# Patient Record
Sex: MALE | Race: WHITE | Employment: FULL TIME | URBAN - METROPOLITAN AREA
[De-identification: names, ages, dates, MRNs, and addresses within clinical notes are randomized per-mention and may not be internally consistent; named-entity substitution may affect disease eponyms.]

---

## 2017-02-06 ENCOUNTER — OP HISTORICAL/CONVERTED ENCOUNTER (OUTPATIENT)
Dept: OTHER | Age: 42
End: 2017-02-06

## 2018-06-06 ENCOUNTER — ED HISTORICAL/CONVERTED ENCOUNTER (OUTPATIENT)
Dept: OTHER | Age: 43
End: 2018-06-06

## 2023-02-07 ENCOUNTER — HOSPITAL ENCOUNTER (EMERGENCY)
Age: 48
Discharge: HOME | End: 2023-02-07
Attending: EMERGENCY MEDICINE
Payer: COMMERCIAL

## 2023-02-07 ENCOUNTER — APPOINTMENT (OUTPATIENT)
Dept: CT IMAGING | Age: 48
End: 2023-02-07
Attending: EMERGENCY MEDICINE
Payer: COMMERCIAL

## 2023-02-07 VITALS
TEMPERATURE: 98.3 F | HEIGHT: 72 IN | WEIGHT: 265 LBS | OXYGEN SATURATION: 98 % | HEART RATE: 88 BPM | RESPIRATION RATE: 16 BRPM | BODY MASS INDEX: 35.89 KG/M2 | SYSTOLIC BLOOD PRESSURE: 132 MMHG | DIASTOLIC BLOOD PRESSURE: 73 MMHG

## 2023-02-07 DIAGNOSIS — S29.019A THORACIC MYOFASCIAL STRAIN, INITIAL ENCOUNTER: Primary | ICD-10-CM

## 2023-02-07 LAB
APPEARANCE UR: CLEAR
BACTERIA URNS QL MICRO: NEGATIVE /HPF
BILIRUB UR QL: NEGATIVE
COLOR UR: YELLOW
EPITH CASTS URNS QL MICRO: NORMAL /LPF
GLUCOSE UR STRIP.AUTO-MCNC: NEGATIVE MG/DL
HGB UR QL STRIP: NEGATIVE
KETONES UR QL STRIP.AUTO: NEGATIVE MG/DL
LEUKOCYTE ESTERASE UR QL STRIP.AUTO: NEGATIVE
MUCOUS THREADS URNS QL MICRO: NORMAL /LPF
NITRITE UR QL STRIP.AUTO: NEGATIVE
PH UR STRIP: 7 (ref 5–8)
PROT UR STRIP-MCNC: NEGATIVE MG/DL
RBC #/AREA URNS HPF: NORMAL /HPF (ref 0–5)
SP GR UR REFRACTOMETRY: 1.01 (ref 1–1.03)
SPERM URNS QL MICRO: <1
UROBILINOGEN UR QL STRIP.AUTO: NEGATIVE EU/DL (ref 0.1–1)
WBC URNS QL MICRO: NORMAL /HPF (ref 0–4)

## 2023-02-07 PROCEDURE — 74011250636 HC RX REV CODE- 250/636: Performed by: EMERGENCY MEDICINE

## 2023-02-07 PROCEDURE — 96374 THER/PROPH/DIAG INJ IV PUSH: CPT

## 2023-02-07 PROCEDURE — 74176 CT ABD & PELVIS W/O CONTRAST: CPT

## 2023-02-07 PROCEDURE — 81003 URINALYSIS AUTO W/O SCOPE: CPT

## 2023-02-07 PROCEDURE — 99284 EMERGENCY DEPT VISIT MOD MDM: CPT

## 2023-02-07 RX ORDER — CYCLOBENZAPRINE HCL 10 MG
10 TABLET ORAL
Qty: 20 TABLET | Refills: 0 | Status: SHIPPED | OUTPATIENT
Start: 2023-02-07

## 2023-02-07 RX ORDER — KETOROLAC TROMETHAMINE 30 MG/ML
30 INJECTION, SOLUTION INTRAMUSCULAR; INTRAVENOUS ONCE
Status: COMPLETED | OUTPATIENT
Start: 2023-02-07 | End: 2023-02-07

## 2023-02-07 RX ORDER — NAPROXEN 500 MG/1
500 TABLET ORAL 2 TIMES DAILY WITH MEALS
Qty: 20 TABLET | Refills: 0 | Status: SHIPPED | OUTPATIENT
Start: 2023-02-07

## 2023-02-07 RX ADMIN — KETOROLAC TROMETHAMINE 30 MG: 30 INJECTION, SOLUTION INTRAMUSCULAR; INTRAVENOUS at 12:02

## 2023-02-07 RX ADMIN — SODIUM CHLORIDE 1000 ML: 9 INJECTION, SOLUTION INTRAVENOUS at 12:02

## 2023-02-07 NOTE — Clinical Note
[address] [facility] EMERGENCY DEPT  [address] [place] [address] [place] [address]  [phone]    Work/School Note    Date: 2/7/2023    To Whom It May concern:      Mary Melara was seen and treated today in the emergency room by the following provider(s):  Attending Provider: Trinity Calvert MD.      Mary Melara is excused from work/school on 02/07/23. He is clear to return to work/school on 02/08/23.         Sincerely,          Herrera Trotter MD

## 2023-02-07 NOTE — DISCHARGE INSTRUCTIONS
Thank you! Thank you for allowing me to care for you in the emergency department. It is my goal to provide you with excellent care. If you have not received excellent quality care, please ask to speak to the nurse manager. Please fill out the survey that will come to you by mail or email since we listen to your feedback! Below you will find a list of your tests from today's visit. Should you have any questions, please do not hesitate to call the emergency department. Labs  Recent Results (from the past 12 hour(s))   URINALYSIS W/ RFLX MICROSCOPIC    Collection Time: 02/07/23 11:52 AM   Result Value Ref Range    Color Yellow      Appearance Clear Clear      Specific gravity 1.015 1.003 - 1.030      pH (UA) 7.0 5.0 - 8.0      Protein Negative Negative mg/dL    Glucose Negative Negative mg/dL    Ketone Negative Negative mg/dL    Bilirubin Negative Negative      Blood Negative Negative      Urobilinogen Negative 0.1 - 1.0 EU/dL    Nitrites Negative Negative      Leukocyte Esterase Negative Negative      WBC 0-4 0 - 4 /hpf    RBC 0-5 0 - 5 /hpf    Epithelial cells Few Few /lpf    Mucus Trace /lpf    PRESUMPTIVE SPERMATO <1     Bacteria Negative Negative /hpf       Radiologic Studies  CT ABD PELV WO CONT   Final Result         Fatty liver, no acute abnormality        CT Results  (Last 48 hours)                 02/07/23 1141  CT ABD PELV WO CONT Final result    Impression:          Fatty liver, no acute abnormality       Narrative:  EXAM: CT ABD PELV WO CONT       INDICATION: r flank pain       COMPARISON: None       IV CONTRAST: None. ORAL CONTRAST: None       TECHNIQUE:    Thin axial images were obtained through the abdomen and pelvis. Coronal and   sagittal reformats were generated. CT dose reduction was achieved through use of   a standardized protocol tailored for this examination and automatic exposure   control for dose modulation.         The absence of intravenous contrast material reduces the sensitivity for   evaluation of the vasculature and solid organs. FINDINGS:    LOWER THORAX: No significant abnormality in the incidentally imaged lower chest.   LIVER: Diffuse fatty liver   BILIARY TREE: Gallbladder is within normal limits. CBD is not dilated. SPLEEN: within normal limits. PANCREAS: No focal abnormality. ADRENALS: Unremarkable. KIDNEYS/URETERS: Subcentimeter hypodensity left kidney too small to   characterize. No hydronephrosis or stone   STOMACH: Unremarkable. SMALL BOWEL: No dilatation or wall thickening. COLON: No dilatation or wall thickening. APPENDIX: Normal   PERITONEUM: No ascites or pneumoperitoneum. RETROPERITONEUM: No lymphadenopathy or aortic aneurysm. There are vascular   calcifications   REPRODUCTIVE ORGANS: Not enlarged   URINARY BLADDER: No mass or calculus. BONES: No destructive bone lesion. ABDOMINAL WALL: Umbilical hernia containing fat   ADDITIONAL COMMENTS: N/A                 CXR Results  (Last 48 hours)      None          ------------------------------------------------------------------------------------------------------------  The exam and treatment you received in the Emergency Department were for an urgent problem and are not intended as complete care. It is important that you follow-up with a doctor, nurse practitioner, or physician assistant to:  (1) confirm your diagnosis,  (2) re-evaluation of changes in your illness and treatment, and  (3) for ongoing care. Please take your discharge instructions with you when you go to your follow-up appointment. If you have any problem arranging a follow-up appointment, contact the Emergency Department. If your symptoms become worse or you do not improve as expected and you are unable to reach your health care provider, please return to the Emergency Department. We are available 24 hours a day.      If a prescription has been provided, please have it filled as soon as possible to prevent a delay in treatment. If you have any questions or reservations about taking the medication due to side effects or interactions with other medications, please call your primary care provider or contact the ER.

## 2023-02-07 NOTE — ED TRIAGE NOTES
Pt complains of left flank pain that started around 1800 yesterday. Denies urinary symptoms.  Denies N/V.

## 2023-02-07 NOTE — Clinical Note
[facility] EMERGENCY DEPT  [address] [place] [id number]  [phone]    Work/School Note    Date: 2/7/2023    To Whom It May concern:    Aldo Nicole was seen and treated today in the emergency room by the following provider(s):  Attending Provider: Janeal Hashimoto, MD.      Aldo Nicole is excused from work/school on 02/07/23 and 02/08/23. He is medically clear to return to work/school on 2/9/2023.        Sincerely,          Eli Trevino RN spouse

## 2023-02-07 NOTE — ED PROVIDER NOTES
EMERGENCY DEPARTMENT HISTORY AND PHYSICAL EXAM      Date: 2/7/2023  Patient Name: Marion Johnson    History of Presenting Illness     Chief Complaint   Patient presents with    Flank Pain       History Provided By: Patient    HPI: Marion Johnson, 49 y.o. male with a past medical history significant No significant past medical history presents to the ED with chief complaint of Flank Pain  . 37 with flank pain on the right side. No radiation. No trauma or twisting movements that he can recall to trigger it. Referred by his primary care doctor. No meds prior to arrival.  Pain is isolated to the flank. There are no other complaints, changes, or physical findings at this time. PCP: Anthony Quick MD    Current Outpatient Medications   Medication Sig Dispense Refill    naproxen (NAPROSYN) 500 mg tablet Take 1 Tablet by mouth two (2) times daily (with meals). 20 Tablet 0    cyclobenzaprine (FLEXERIL) 10 mg tablet Take 1 Tablet by mouth three (3) times daily as needed for Muscle Spasm(s). 20 Tablet 0       Past History     Past Medical History:  Past Medical History:   Diagnosis Date    Hypertension        Past Surgical History:  History reviewed. No pertinent surgical history. Family History:  History reviewed. No pertinent family history. Social History:  Social History     Tobacco Use    Smoking status: Never    Smokeless tobacco: Never   Substance Use Topics    Alcohol use: Never    Drug use: Never       Allergies: Allergies   Allergen Reactions    Azithromycin Hives         Review of Systems   Review of Systems   Constitutional: Negative. Negative for chills, fatigue and fever. HENT: Negative. Negative for congestion, ear pain, nosebleeds and sore throat. Eyes: Negative. Negative for pain, discharge and visual disturbance. Respiratory: Negative. Negative for cough, chest tightness and shortness of breath. Cardiovascular: Negative.   Negative for chest pain and leg swelling. Gastrointestinal: Negative. Negative for abdominal pain, blood in stool, constipation, diarrhea, nausea and vomiting. Endocrine: Negative. Genitourinary:  Positive for flank pain. Negative for difficulty urinating and dysuria. Musculoskeletal:  Negative for back pain and myalgias. Skin: Negative. Negative for rash and wound. Allergic/Immunologic: Negative. Neurological: Negative. Negative for dizziness, syncope, weakness, numbness and headaches. Hematological: Negative. Does not bruise/bleed easily. Psychiatric/Behavioral: Negative. Negative for agitation, confusion, hallucinations and suicidal ideas. All other systems reviewed and are negative. Positives and Pertinent negatives as per HPI. Physical Exam   Patient Vitals for the past 24 hrs:   Temp Pulse Resp BP SpO2   02/07/23 1323 -- 88 16 132/73 98 %   02/07/23 1115 98.3 °F (36.8 °C) 79 17 (!) 147/99 99 %         Physical Exam  Vitals and nursing note reviewed. Constitutional:       General: He is not in acute distress. Appearance: He is normal weight. He is not ill-appearing. HENT:      Head: Normocephalic and atraumatic. Right Ear: External ear normal.      Left Ear: External ear normal.      Nose: Nose normal. No rhinorrhea. Mouth/Throat:      Mouth: Mucous membranes are moist.      Pharynx: Oropharynx is clear. Eyes:      Extraocular Movements: Extraocular movements intact. Conjunctiva/sclera: Conjunctivae normal.      Pupils: Pupils are equal, round, and reactive to light. Cardiovascular:      Rate and Rhythm: Normal rate and regular rhythm. Pulses: Normal pulses. Heart sounds: Normal heart sounds. Pulmonary:      Effort: Pulmonary effort is normal. No respiratory distress. Breath sounds: Normal breath sounds. Abdominal:      General: Abdomen is flat. Bowel sounds are normal.      Palpations: Abdomen is soft. Tenderness: There is right CVA tenderness. Musculoskeletal:         General: No tenderness or deformity. Normal range of motion. Cervical back: Normal range of motion and neck supple. Skin:     General: Skin is warm and dry. Capillary Refill: Capillary refill takes less than 2 seconds. Findings: No bruising, lesion or rash. Neurological:      General: No focal deficit present. Mental Status: He is alert and oriented to person, place, and time. Mental status is at baseline. Psychiatric:         Mood and Affect: Mood normal.         Behavior: Behavior normal.         Thought Content: Thought content normal.         Judgment: Judgment normal.       Diagnostic Study Results     LABS:   Recent Results (from the past 12 hour(s))   URINALYSIS W/ RFLX MICROSCOPIC    Collection Time: 02/07/23 11:52 AM   Result Value Ref Range    Color Yellow      Appearance Clear Clear      Specific gravity 1.015 1.003 - 1.030      pH (UA) 7.0 5.0 - 8.0      Protein Negative Negative mg/dL    Glucose Negative Negative mg/dL    Ketone Negative Negative mg/dL    Bilirubin Negative Negative      Blood Negative Negative      Urobilinogen Negative 0.1 - 1.0 EU/dL    Nitrites Negative Negative      Leukocyte Esterase Negative Negative      WBC 0-4 0 - 4 /hpf    RBC 0-5 0 - 5 /hpf    Epithelial cells Few Few /lpf    Mucus Trace /lpf    PRESUMPTIVE SPERMATO <1     Bacteria Negative Negative /hpf        EKG: EKG interpreted independently by ER physician. RADIOLOGY:  Non-plain film images such as CT, Ultrasound and MRI are read by the radiologist. Plain radiographic images are visualized and preliminarily interpreted by the ED Provider with the below findings:          Interpretation per the Radiologist below, if available at the time of this note:     CT ABD PELV WO CONT    Result Date: 2/7/2023  EXAM: CT ABD PELV WO CONT INDICATION: r flank pain COMPARISON: None IV CONTRAST: None.  ORAL CONTRAST: None TECHNIQUE: Thin axial images were obtained through the abdomen and pelvis. Coronal and sagittal reformats were generated. CT dose reduction was achieved through use of a standardized protocol tailored for this examination and automatic exposure control for dose modulation. The absence of intravenous contrast material reduces the sensitivity for evaluation of the vasculature and solid organs. FINDINGS: LOWER THORAX: No significant abnormality in the incidentally imaged lower chest. LIVER: Diffuse fatty liver BILIARY TREE: Gallbladder is within normal limits. CBD is not dilated. SPLEEN: within normal limits. PANCREAS: No focal abnormality. ADRENALS: Unremarkable. KIDNEYS/URETERS: Subcentimeter hypodensity left kidney too small to characterize. No hydronephrosis or stone STOMACH: Unremarkable. SMALL BOWEL: No dilatation or wall thickening. COLON: No dilatation or wall thickening. APPENDIX: Normal PERITONEUM: No ascites or pneumoperitoneum. RETROPERITONEUM: No lymphadenopathy or aortic aneurysm. There are vascular calcifications REPRODUCTIVE ORGANS: Not enlarged URINARY BLADDER: No mass or calculus. BONES: No destructive bone lesion. ABDOMINAL WALL: Umbilical hernia containing fat ADDITIONAL COMMENTS: N/A     Fatty liver, no acute abnormality    CT Results  (Last 48 hours)                 02/07/23 1141  CT ABD PELV WO CONT Final result    Impression:          Fatty liver, no acute abnormality       Narrative:  EXAM: CT ABD PELV WO CONT       INDICATION: r flank pain       COMPARISON: None       IV CONTRAST: None. ORAL CONTRAST: None       TECHNIQUE:    Thin axial images were obtained through the abdomen and pelvis. Coronal and   sagittal reformats were generated. CT dose reduction was achieved through use of   a standardized protocol tailored for this examination and automatic exposure   control for dose modulation. The absence of intravenous contrast material reduces the sensitivity for   evaluation of the vasculature and solid organs.        FINDINGS:    LOWER THORAX: No significant abnormality in the incidentally imaged lower chest.   LIVER: Diffuse fatty liver   BILIARY TREE: Gallbladder is within normal limits. CBD is not dilated. SPLEEN: within normal limits. PANCREAS: No focal abnormality. ADRENALS: Unremarkable. KIDNEYS/URETERS: Subcentimeter hypodensity left kidney too small to   characterize. No hydronephrosis or stone   STOMACH: Unremarkable. SMALL BOWEL: No dilatation or wall thickening. COLON: No dilatation or wall thickening. APPENDIX: Normal   PERITONEUM: No ascites or pneumoperitoneum. RETROPERITONEUM: No lymphadenopathy or aortic aneurysm. There are vascular   calcifications   REPRODUCTIVE ORGANS: Not enlarged   URINARY BLADDER: No mass or calculus. BONES: No destructive bone lesion. ABDOMINAL WALL: Umbilical hernia containing fat   ADDITIONAL COMMENTS: N/A                 CXR Results  (Last 48 hours)      None            Medical Decision Making and ED Course       CC/HPI Summary, DDx, ED Course, and Reassessment: 50-year-old presents with flank pain. Differential includes musculoskeletal versus kidney stone versus pyelonephritis. Also dissection. Plan for urine CAT scan and reassessment after pain control. These orders were placed during the ER evaluation:  Orders Placed This Encounter    CT ABD PELV WO CONT     Standing Status:   Standing     Number of Occurrences:   1     Order Specific Question:   Type of contrast.  PLEASE NOTE: IV contrast is NOT utilized with this order.      Answer:   None     Order Specific Question:   Transport     Answer:   Stretcher [5]     Order Specific Question:   Reason for Exam     Answer:   r flank pain     Order Specific Question:   Decision Support Exception     Answer:   Emergency Medical Condition (MA) [1]    URINALYSIS W/ RFLX MICROSCOPIC     Standing Status:   Standing     Number of Occurrences:   1    ketorolac (TORADOL) injection 30 mg    sodium chloride 0.9 % bolus infusion 1,000 mL naproxen (NAPROSYN) 500 mg tablet     Sig: Take 1 Tablet by mouth two (2) times daily (with meals). Dispense:  20 Tablet     Refill:  0    cyclobenzaprine (FLEXERIL) 10 mg tablet     Sig: Take 1 Tablet by mouth three (3) times daily as needed for Muscle Spasm(s). Dispense:  20 Tablet     Refill:  0        Patient was given the following medications:  Medications   ketorolac (TORADOL) injection 30 mg (30 mg IntraVENous Given 2/7/23 1202)   sodium chloride 0.9 % bolus infusion 1,000 mL (0 mL IntraVENous IV Completed 2/7/23 1323)           ED Course:       ED Course as of 02/07/23 1414   Tue Feb 07, 2023   1310 WBC: 0-4 [HP]   1310 Epithelial cells: Few  Ua neg [HP]      ED Course User Index  [HP] Hector Snyder MD         Vital Signs-Reviewed the patient's vital signs. Patient Vitals for the past 24 hrs:   Temp Pulse Resp BP SpO2   02/07/23 1323 -- 88 16 132/73 98 %   02/07/23 1115 98.3 °F (36.8 °C) 79 17 (!) 147/99 99 %     Vitals interpreted independently by ER physician. stable    Medical decision making tools:                No data recorded          Disposition Considerations (Tests not done, Shared Decision Making, Pt Expectation of Test or Tx.): 57-year-old male with flank pain CAT scan is negative for kidney stones. Urine also unremarkable. Pain improved with Toradol. Patient also is not tachypneic or hypoxic to suggest a PE. No evidence of a AAA. Patient closely follow-up with PCP will discharge home with muscle relaxant Flexeril and NSAIDs for pain. Encourage heat application. CONSULTS: (Who and What was discussed)  None    Chronic Conditions: no    Social Determinants affecting Dx or Tx: None    Records Reviewed (source and summary of external notes): None  Records Reviewed: Previous Hospital chart. EMS run report. I reviewed the vital signs, available nursing notes, past medical history, past surgical history, family history and social history. Initial assessment performed.  The patients presenting problems have been discussed, and they are in agreement with the care plan formulated and outlined with them. I have encouraged them to ask questions as they arise throughout their visit. Discharged      Procedures               Disposition       Emergency Department Disposition:  Discharged      Diagnosis     Clinical Impression:   1. Thoracic myofascial strain, initial encounter        Attestations:    Cathy Khan MD    Please note that this dictation was completed with [employer], the computer voice recognition software. Quite often unanticipated grammatical, syntax, homophones, and other interpretive errors are inadvertently transcribed by the computer software. Please disregard these errors. Please excuse any errors that have escaped final proofreading. Thank you.

## 2023-02-07 NOTE — Clinical Note
[facility] EMERGENCY DEPT  [address] [place] [id number]  [phone]    Work/School Note    Date: 2/7/2023    To Whom It May concern:      Aldo Nicole was seen and treated today in the emergency room by the following provider(s):  Attending Provider: Janeal Hashimoto, MD.      Aldo Nicole is excused from work/school on 02/07/23. He is clear to return to work/school on 02/08/23.         Sincerely,          Eli Trevino RN

## 2023-02-07 NOTE — Clinical Note
[facility] EMERGENCY DEPT  [address] [place] [id number]  [phone]    Work/School Note    Date: 2/7/2023    To Whom It May concern:    Wes Giang was seen and treated today in the emergency room by the following provider(s):  Attending Provider: Jorge Simpson MD.      Wes Giang is excused from work/school on 02/07/23 and 02/08/23. He is medically clear to return to work/school on 2/9/2023.        Sincerely,          Jose Goodwin MD

## 2023-10-04 ENCOUNTER — HOSPITAL ENCOUNTER (EMERGENCY)
Facility: HOSPITAL | Age: 48
Discharge: HOME | End: 2023-10-04
Payer: OTHER MISCELLANEOUS

## 2023-10-04 VITALS
HEIGHT: 70 IN | OXYGEN SATURATION: 96 % | HEART RATE: 81 BPM | BODY MASS INDEX: 40.09 KG/M2 | SYSTOLIC BLOOD PRESSURE: 151 MMHG | WEIGHT: 280 LBS | TEMPERATURE: 98.2 F | DIASTOLIC BLOOD PRESSURE: 91 MMHG | RESPIRATION RATE: 16 BRPM

## 2023-10-04 DIAGNOSIS — Z98.890 HISTORY OF REPAIR OF ROTATOR CUFF: ICD-10-CM
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
## 2023-10-04 PROCEDURE — 96372 THER/PROPH/DIAG INJ SC/IM: CPT

## 2023-10-04 PROCEDURE — 6360000002 HC RX W HCPCS

## 2023-10-04 PROCEDURE — 99284 EMERGENCY DEPT VISIT MOD MDM: CPT

## 2023-10-04 RX ORDER — KETOROLAC TROMETHAMINE 30 MG/ML
30 INJECTION, SOLUTION INTRAMUSCULAR; INTRAVENOUS
Status: COMPLETED | OUTPATIENT
Start: 2023-10-04 | End: 2023-10-04

## 2023-10-04 RX ADMIN — KETOROLAC TROMETHAMINE 30 MG: 30 INJECTION, SOLUTION INTRAMUSCULAR; INTRAVENOUS at 17:11

## 2023-10-04 ASSESSMENT — PAIN SCALES - GENERAL: PAINLEVEL_OUTOF10: 2

## 2023-10-04 ASSESSMENT — PAIN DESCRIPTION - LOCATION: LOCATION: SHOULDER

## 2023-10-04 NOTE — ED NOTES
Pt reports dizziness and headache. Pt states, \"I'm more worried about my head, my shoulder is what it is. You won't be able to tell anything from a XR. \"     Grisel Kevin RN  10/04/23 0051
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
## 2025-06-19 ENCOUNTER — HOSPITAL ENCOUNTER (INPATIENT)
Facility: HOSPITAL | Age: 50
LOS: 2 days | Discharge: ANOTHER HOSPITAL | End: 2025-06-21
Attending: STUDENT IN AN ORGANIZED HEALTH CARE EDUCATION/TRAINING PROGRAM | Admitting: FAMILY MEDICINE
Payer: COMMERCIAL

## 2025-06-19 ENCOUNTER — APPOINTMENT (OUTPATIENT)
Facility: HOSPITAL | Age: 50
End: 2025-06-19
Payer: COMMERCIAL

## 2025-06-19 DIAGNOSIS — I24.9 ACUTE CORONARY SYNDROME (HCC): ICD-10-CM

## 2025-06-19 DIAGNOSIS — I21.4 NSTEMI (NON-ST ELEVATED MYOCARDIAL INFARCTION) (HCC): ICD-10-CM

## 2025-06-19 DIAGNOSIS — R79.89 ELEVATED TROPONIN: ICD-10-CM

## 2025-06-19 DIAGNOSIS — R07.9 CHEST PAIN, UNSPECIFIED TYPE: Primary | ICD-10-CM

## 2025-06-19 LAB
ALBUMIN SERPL-MCNC: 3.4 G/DL (ref 3.5–5)
ALBUMIN SERPL-MCNC: 3.8 G/DL (ref 3.5–5)
ALBUMIN/GLOB SERPL: 1 (ref 1.1–2.2)
ALBUMIN/GLOB SERPL: 1.1 (ref 1.1–2.2)
ALP SERPL-CCNC: 53 U/L (ref 45–117)
ALP SERPL-CCNC: 64 U/L (ref 45–117)
ALT SERPL-CCNC: 50 U/L (ref 12–78)
ALT SERPL-CCNC: 62 U/L (ref 12–78)
ANION GAP SERPL CALC-SCNC: 7 MMOL/L (ref 2–12)
ANION GAP SERPL CALC-SCNC: 9 MMOL/L (ref 2–12)
AST SERPL W P-5'-P-CCNC: 39 U/L (ref 15–37)
AST SERPL W P-5'-P-CCNC: 39 U/L (ref 15–37)
BASOPHILS # BLD: 0.21 K/UL (ref 0–0.1)
BASOPHILS NFR BLD: 1.1 % (ref 0–1)
BILIRUB SERPL-MCNC: 0.6 MG/DL (ref 0.2–1)
BILIRUB SERPL-MCNC: 0.6 MG/DL (ref 0.2–1)
BUN SERPL-MCNC: 12 MG/DL (ref 6–20)
BUN SERPL-MCNC: 14 MG/DL (ref 6–20)
BUN/CREAT SERPL: 13 (ref 12–20)
BUN/CREAT SERPL: 14 (ref 12–20)
CA-I BLD-MCNC: 9 MG/DL (ref 8.5–10.1)
CA-I BLD-MCNC: 9.3 MG/DL (ref 8.5–10.1)
CHLORIDE SERPL-SCNC: 104 MMOL/L (ref 97–108)
CHLORIDE SERPL-SCNC: 106 MMOL/L (ref 97–108)
CO2 SERPL-SCNC: 24 MMOL/L (ref 21–32)
CO2 SERPL-SCNC: 26 MMOL/L (ref 21–32)
CREAT SERPL-MCNC: 0.9 MG/DL (ref 0.7–1.3)
CREAT SERPL-MCNC: 0.98 MG/DL (ref 0.7–1.3)
D DIMER PPP FEU-MCNC: <0.27 UG/ML(FEU)
DIFFERENTIAL METHOD BLD: ABNORMAL
EKG ATRIAL RATE: 94 BPM
EKG DIAGNOSIS: NORMAL
EKG P AXIS: -15 DEGREES
EKG P-R INTERVAL: 150 MS
EKG Q-T INTERVAL: 344 MS
EKG QRS DURATION: 80 MS
EKG QTC CALCULATION (BAZETT): 430 MS
EKG R AXIS: -19 DEGREES
EKG T AXIS: 50 DEGREES
EKG VENTRICULAR RATE: 94 BPM
EOSINOPHIL # BLD: 0.26 K/UL (ref 0–0.4)
EOSINOPHIL NFR BLD: 1.3 % (ref 0–7)
ERYTHROCYTE [DISTWIDTH] IN BLOOD BY AUTOMATED COUNT: 13 % (ref 11.5–14.5)
GLOBULIN SER CALC-MCNC: 3.2 G/DL (ref 2–4)
GLOBULIN SER CALC-MCNC: 3.7 G/DL (ref 2–4)
GLUCOSE SERPL-MCNC: 110 MG/DL (ref 65–100)
GLUCOSE SERPL-MCNC: 111 MG/DL (ref 65–100)
HCT VFR BLD AUTO: 48.7 % (ref 36.6–50.3)
HGB BLD-MCNC: 17 G/DL (ref 12.1–17)
IMM GRANULOCYTES # BLD AUTO: 0.32 K/UL (ref 0–0.04)
IMM GRANULOCYTES NFR BLD AUTO: 1.6 % (ref 0–0.5)
LYMPHOCYTES # BLD: 3.11 K/UL (ref 0.8–3.5)
LYMPHOCYTES NFR BLD: 15.6 % (ref 12–49)
MCH RBC QN AUTO: 30 PG (ref 26–34)
MCHC RBC AUTO-ENTMCNC: 34.9 G/DL (ref 30–36.5)
MCV RBC AUTO: 85.9 FL (ref 80–99)
MONOCYTES # BLD: 1.62 K/UL (ref 0–1)
MONOCYTES NFR BLD: 8.1 % (ref 5–13)
NEUTS SEG # BLD: 14.46 K/UL (ref 1.8–8)
NEUTS SEG NFR BLD: 72.3 % (ref 32–75)
NRBC # BLD: 0 K/UL (ref 0–0.01)
NRBC BLD-RTO: 0 PER 100 WBC
PLATELET # BLD AUTO: 310 K/UL (ref 150–400)
PMV BLD AUTO: 10.9 FL (ref 8.9–12.9)
POTASSIUM SERPL-SCNC: 3.7 MMOL/L (ref 3.5–5.1)
POTASSIUM SERPL-SCNC: 4 MMOL/L (ref 3.5–5.1)
PROT SERPL-MCNC: 6.6 G/DL (ref 6.4–8.2)
PROT SERPL-MCNC: 7.5 G/DL (ref 6.4–8.2)
RBC # BLD AUTO: 5.67 M/UL (ref 4.1–5.7)
SODIUM SERPL-SCNC: 137 MMOL/L (ref 136–145)
SODIUM SERPL-SCNC: 139 MMOL/L (ref 136–145)
TROPONIN I SERPL HS-MCNC: 16 NG/L (ref 0–76)
TROPONIN I SERPL HS-MCNC: 393 NG/L (ref 0–76)
TROPONIN I SERPL HS-MCNC: 98 NG/L (ref 0–76)
WBC # BLD AUTO: 20 K/UL (ref 4.1–11.1)

## 2025-06-19 PROCEDURE — 6370000000 HC RX 637 (ALT 250 FOR IP): Performed by: STUDENT IN AN ORGANIZED HEALTH CARE EDUCATION/TRAINING PROGRAM

## 2025-06-19 PROCEDURE — 71045 X-RAY EXAM CHEST 1 VIEW: CPT

## 2025-06-19 PROCEDURE — 80053 COMPREHEN METABOLIC PANEL: CPT

## 2025-06-19 PROCEDURE — 85025 COMPLETE CBC W/AUTO DIFF WBC: CPT

## 2025-06-19 PROCEDURE — 99285 EMERGENCY DEPT VISIT HI MDM: CPT

## 2025-06-19 PROCEDURE — 84484 ASSAY OF TROPONIN QUANT: CPT

## 2025-06-19 PROCEDURE — 2060000000 HC ICU INTERMEDIATE R&B

## 2025-06-19 PROCEDURE — 87040 BLOOD CULTURE FOR BACTERIA: CPT

## 2025-06-19 PROCEDURE — 93005 ELECTROCARDIOGRAM TRACING: CPT

## 2025-06-19 PROCEDURE — 85379 FIBRIN DEGRADATION QUANT: CPT

## 2025-06-19 PROCEDURE — 36415 COLL VENOUS BLD VENIPUNCTURE: CPT

## 2025-06-19 RX ORDER — HEPARIN SODIUM 1000 [USP'U]/ML
4000 INJECTION, SOLUTION INTRAVENOUS; SUBCUTANEOUS ONCE
Status: COMPLETED | OUTPATIENT
Start: 2025-06-20 | End: 2025-06-20

## 2025-06-19 RX ORDER — NITROGLYCERIN 0.4 MG/1
0.4 TABLET SUBLINGUAL ONCE
Status: DISCONTINUED | OUTPATIENT
Start: 2025-06-19 | End: 2025-06-20

## 2025-06-19 RX ORDER — ACETAMINOPHEN 325 MG/1
650 TABLET ORAL EVERY 6 HOURS PRN
Status: DISCONTINUED | OUTPATIENT
Start: 2025-06-19 | End: 2025-06-21 | Stop reason: HOSPADM

## 2025-06-19 RX ORDER — ASPIRIN 325 MG
325 TABLET ORAL
Status: COMPLETED | OUTPATIENT
Start: 2025-06-19 | End: 2025-06-19

## 2025-06-19 RX ORDER — ONDANSETRON 4 MG/1
4 TABLET, ORALLY DISINTEGRATING ORAL EVERY 8 HOURS PRN
Status: DISCONTINUED | OUTPATIENT
Start: 2025-06-19 | End: 2025-06-21 | Stop reason: HOSPADM

## 2025-06-19 RX ORDER — HEPARIN SODIUM 1000 [USP'U]/ML
2000 INJECTION, SOLUTION INTRAVENOUS; SUBCUTANEOUS PRN
Status: DISCONTINUED | OUTPATIENT
Start: 2025-06-19 | End: 2025-06-21 | Stop reason: HOSPADM

## 2025-06-19 RX ORDER — HEPARIN SODIUM 1000 [USP'U]/ML
4000 INJECTION, SOLUTION INTRAVENOUS; SUBCUTANEOUS PRN
Status: DISCONTINUED | OUTPATIENT
Start: 2025-06-19 | End: 2025-06-21 | Stop reason: HOSPADM

## 2025-06-19 RX ORDER — SPIRONOLACTONE 25 MG/1
25 TABLET ORAL DAILY
Status: DISCONTINUED | OUTPATIENT
Start: 2025-06-20 | End: 2025-06-21 | Stop reason: HOSPADM

## 2025-06-19 RX ORDER — ALLOPURINOL 300 MG/1
300 TABLET ORAL DAILY
Status: DISCONTINUED | OUTPATIENT
Start: 2025-06-20 | End: 2025-06-21 | Stop reason: HOSPADM

## 2025-06-19 RX ORDER — MAGNESIUM SULFATE IN WATER 40 MG/ML
2000 INJECTION, SOLUTION INTRAVENOUS PRN
Status: DISCONTINUED | OUTPATIENT
Start: 2025-06-19 | End: 2025-06-21 | Stop reason: HOSPADM

## 2025-06-19 RX ORDER — CARVEDILOL 3.12 MG/1
6.25 TABLET ORAL 2 TIMES DAILY WITH MEALS
Status: DISCONTINUED | OUTPATIENT
Start: 2025-06-20 | End: 2025-06-21 | Stop reason: HOSPADM

## 2025-06-19 RX ORDER — ATORVASTATIN CALCIUM 40 MG/1
40 TABLET, FILM COATED ORAL NIGHTLY
Status: DISCONTINUED | OUTPATIENT
Start: 2025-06-19 | End: 2025-06-21 | Stop reason: HOSPADM

## 2025-06-19 RX ORDER — SODIUM CHLORIDE 0.9 % (FLUSH) 0.9 %
5-40 SYRINGE (ML) INJECTION EVERY 12 HOURS SCHEDULED
Status: DISCONTINUED | OUTPATIENT
Start: 2025-06-19 | End: 2025-06-21 | Stop reason: HOSPADM

## 2025-06-19 RX ORDER — ACETAMINOPHEN 650 MG/1
650 SUPPOSITORY RECTAL EVERY 6 HOURS PRN
Status: DISCONTINUED | OUTPATIENT
Start: 2025-06-19 | End: 2025-06-21 | Stop reason: HOSPADM

## 2025-06-19 RX ORDER — SODIUM CHLORIDE 9 MG/ML
INJECTION, SOLUTION INTRAVENOUS PRN
Status: DISCONTINUED | OUTPATIENT
Start: 2025-06-19 | End: 2025-06-21 | Stop reason: HOSPADM

## 2025-06-19 RX ORDER — VALACYCLOVIR HYDROCHLORIDE 500 MG/1
500 TABLET, FILM COATED ORAL DAILY
Status: DISCONTINUED | OUTPATIENT
Start: 2025-06-20 | End: 2025-06-21 | Stop reason: HOSPADM

## 2025-06-19 RX ORDER — POTASSIUM CHLORIDE 1500 MG/1
40 TABLET, EXTENDED RELEASE ORAL PRN
Status: DISCONTINUED | OUTPATIENT
Start: 2025-06-19 | End: 2025-06-21 | Stop reason: HOSPADM

## 2025-06-19 RX ORDER — SODIUM CHLORIDE 0.9 % (FLUSH) 0.9 %
5-40 SYRINGE (ML) INJECTION PRN
Status: DISCONTINUED | OUTPATIENT
Start: 2025-06-19 | End: 2025-06-21 | Stop reason: HOSPADM

## 2025-06-19 RX ORDER — LOSARTAN POTASSIUM 50 MG/1
100 TABLET ORAL DAILY
Status: DISCONTINUED | OUTPATIENT
Start: 2025-06-20 | End: 2025-06-20

## 2025-06-19 RX ORDER — NITROGLYCERIN 0.4 MG/1
0.4 TABLET SUBLINGUAL
Status: COMPLETED | OUTPATIENT
Start: 2025-06-19 | End: 2025-06-19

## 2025-06-19 RX ORDER — PANTOPRAZOLE SODIUM 40 MG/1
40 TABLET, DELAYED RELEASE ORAL
Status: DISCONTINUED | OUTPATIENT
Start: 2025-06-20 | End: 2025-06-21 | Stop reason: HOSPADM

## 2025-06-19 RX ORDER — HYDROCHLOROTHIAZIDE 25 MG/1
25 TABLET ORAL DAILY
Status: DISCONTINUED | OUTPATIENT
Start: 2025-06-20 | End: 2025-06-20

## 2025-06-19 RX ORDER — ONDANSETRON 2 MG/ML
4 INJECTION INTRAMUSCULAR; INTRAVENOUS EVERY 6 HOURS PRN
Status: DISCONTINUED | OUTPATIENT
Start: 2025-06-19 | End: 2025-06-21 | Stop reason: HOSPADM

## 2025-06-19 RX ORDER — POLYETHYLENE GLYCOL 3350 17 G/17G
17 POWDER, FOR SOLUTION ORAL DAILY PRN
Status: DISCONTINUED | OUTPATIENT
Start: 2025-06-19 | End: 2025-06-21 | Stop reason: HOSPADM

## 2025-06-19 RX ORDER — ASPIRIN 81 MG/1
81 TABLET, CHEWABLE ORAL DAILY
Status: DISCONTINUED | OUTPATIENT
Start: 2025-06-20 | End: 2025-06-20

## 2025-06-19 RX ORDER — SODIUM CHLORIDE 9 MG/ML
INJECTION, SOLUTION INTRAVENOUS CONTINUOUS
Status: DISCONTINUED | OUTPATIENT
Start: 2025-06-19 | End: 2025-06-21 | Stop reason: HOSPADM

## 2025-06-19 RX ORDER — POTASSIUM CHLORIDE 7.45 MG/ML
10 INJECTION INTRAVENOUS PRN
Status: DISCONTINUED | OUTPATIENT
Start: 2025-06-19 | End: 2025-06-21 | Stop reason: HOSPADM

## 2025-06-19 RX ORDER — ENOXAPARIN SODIUM 100 MG/ML
30 INJECTION SUBCUTANEOUS 2 TIMES DAILY
Status: DISCONTINUED | OUTPATIENT
Start: 2025-06-19 | End: 2025-06-19

## 2025-06-19 RX ORDER — HEPARIN SODIUM 10000 [USP'U]/100ML
5-30 INJECTION, SOLUTION INTRAVENOUS CONTINUOUS
Status: DISCONTINUED | OUTPATIENT
Start: 2025-06-20 | End: 2025-06-21 | Stop reason: HOSPADM

## 2025-06-19 RX ADMIN — Medication 0.4 MG: at 20:19

## 2025-06-19 RX ADMIN — ASPIRIN 325 MG: 325 TABLET ORAL at 20:19

## 2025-06-19 ASSESSMENT — PAIN SCALES - GENERAL
PAINLEVEL_OUTOF10: 0
PAINLEVEL_OUTOF10: 4

## 2025-06-19 ASSESSMENT — PAIN - FUNCTIONAL ASSESSMENT: PAIN_FUNCTIONAL_ASSESSMENT: 0-10

## 2025-06-19 ASSESSMENT — HEART SCORE: ECG: NORMAL

## 2025-06-19 NOTE — ED TRIAGE NOTES
Chest pain starting 1 hour prior, reports chest pain radiating to lt arm, lt arm tingling. Reports difficult taking deep breaths.  Pt reports having episodes of severe sweating while sitting still. Pt denies nausea vomiting. Reports dizziness and feeling as if he may pass out.  Pt reports family hx of cardiac issues.

## 2025-06-20 ENCOUNTER — APPOINTMENT (OUTPATIENT)
Facility: HOSPITAL | Age: 50
End: 2025-06-20
Attending: FAMILY MEDICINE
Payer: COMMERCIAL

## 2025-06-20 ENCOUNTER — APPOINTMENT (OUTPATIENT)
Facility: HOSPITAL | Age: 50
End: 2025-06-20
Attending: INTERNAL MEDICINE
Payer: COMMERCIAL

## 2025-06-20 LAB
ACT BLD: 124 SEC (ref 74–125)
APPEARANCE UR: CLEAR
APTT PPP: 25.6 SEC (ref 21.2–34.1)
BACTERIA URNS QL MICRO: NEGATIVE /HPF
BASOPHILS # BLD: 0.15 K/UL (ref 0–0.1)
BASOPHILS NFR BLD: 1 % (ref 0–1)
BILIRUB UR QL: NEGATIVE
COLOR UR: ABNORMAL
DIFFERENTIAL METHOD BLD: ABNORMAL
ECHO AO ASC DIAM: 3.1 CM
ECHO AO ASCENDING AORTA INDEX: 1.22 CM/M2
ECHO AO ROOT DIAM: 3.7 CM
ECHO AO ROOT INDEX: 1.46 CM/M2
ECHO AV AREA PEAK VELOCITY: 4.6 CM2
ECHO AV AREA VTI: 5.1 CM2
ECHO AV AREA/BSA PEAK VELOCITY: 1.8 CM2/M2
ECHO AV AREA/BSA VTI: 2 CM2/M2
ECHO AV MEAN GRADIENT: 5 MMHG
ECHO AV MEAN VELOCITY: 1 M/S
ECHO AV PEAK GRADIENT: 8 MMHG
ECHO AV PEAK VELOCITY: 1.5 M/S
ECHO AV VELOCITY RATIO: 0.93
ECHO AV VTI: 23.2 CM
ECHO BSA: 2.58 M2
ECHO BSA: 2.58 M2
ECHO EST RA PRESSURE: 3 MMHG
ECHO LA AREA 4C: 11.2 CM2
ECHO LA DIAMETER INDEX: 1.5 CM/M2
ECHO LA DIAMETER: 3.8 CM
ECHO LA MAJOR AXIS: 4.3 CM
ECHO LA TO AORTIC ROOT RATIO: 1.03
ECHO LA VOL MOD A4C: 23 ML (ref 18–58)
ECHO LA VOLUME INDEX MOD A4C: 9 ML/M2 (ref 16–34)
ECHO LV E' LATERAL VELOCITY: 14.5 CM/S
ECHO LV E' SEPTAL VELOCITY: 9.57 CM/S
ECHO LV EF PHYSICIAN: 60 %
ECHO LV EJECTION FRACTION BIPLANE: 60 % (ref 55–100)
ECHO LV FRACTIONAL SHORTENING: 31 % (ref 28–44)
ECHO LV INTERNAL DIMENSION DIASTOLE INDEX: 1.77 CM/M2
ECHO LV INTERNAL DIMENSION DIASTOLIC: 4.5 CM (ref 4.2–5.9)
ECHO LV INTERNAL DIMENSION SYSTOLIC INDEX: 1.22 CM/M2
ECHO LV INTERNAL DIMENSION SYSTOLIC: 3.1 CM
ECHO LV IVSD: 1.1 CM (ref 0.6–1)
ECHO LV MASS 2D: 175 G (ref 88–224)
ECHO LV MASS INDEX 2D: 68.9 G/M2 (ref 49–115)
ECHO LV POSTERIOR WALL DIASTOLIC: 1.1 CM (ref 0.6–1)
ECHO LV RELATIVE WALL THICKNESS RATIO: 0.49
ECHO LVOT AREA: 4.9 CM2
ECHO LVOT AV VTI INDEX: 1.03
ECHO LVOT DIAM: 2.5 CM
ECHO LVOT MEAN GRADIENT: 4 MMHG
ECHO LVOT PEAK GRADIENT: 8 MMHG
ECHO LVOT PEAK VELOCITY: 1.4 M/S
ECHO LVOT STROKE VOLUME INDEX: 46.2 ML/M2
ECHO LVOT SV: 117.3 ML
ECHO LVOT VTI: 23.9 CM
ECHO MV A VELOCITY: 0.91 M/S
ECHO MV AREA VTI: 3.7 CM2
ECHO MV E DECELERATION TIME (DT): 209 MS
ECHO MV E VELOCITY: 0.79 M/S
ECHO MV E/A RATIO: 0.87
ECHO MV E/E' LATERAL: 5.45
ECHO MV E/E' RATIO (AVERAGED): 6.85
ECHO MV E/E' SEPTAL: 8.25
ECHO MV LVOT VTI INDEX: 1.33
ECHO MV MAX VELOCITY: 1.1 M/S
ECHO MV MEAN GRADIENT: 2 MMHG
ECHO MV MEAN VELOCITY: 0.6 M/S
ECHO MV PEAK GRADIENT: 5 MMHG
ECHO MV REGURGITANT PEAK GRADIENT: 4 MMHG
ECHO MV REGURGITANT PEAK VELOCITY: 1 M/S
ECHO MV REGURGITANT VTIA: 19.2 CM
ECHO MV VTI: 31.8 CM
ECHO PV MAX VELOCITY: 1 M/S
ECHO PV MEAN GRADIENT: 2 MMHG
ECHO PV MEAN VELOCITY: 0.7 M/S
ECHO PV PEAK GRADIENT: 4 MMHG
ECHO PV VTI: 20.8 CM
ECHO RA AREA 4C: 10.6 CM2
ECHO RA END SYSTOLIC VOLUME APICAL 4 CHAMBER INDEX BSA: 9 ML/M2
ECHO RA VOLUME: 23 ML
ECHO RIGHT VENTRICULAR SYSTOLIC PRESSURE (RVSP): 16 MMHG
ECHO RV BASAL DIMENSION: 3.8 CM
ECHO RV FREE WALL PEAK S': 14.3 CM/S
ECHO RV MID DIMENSION: 2.9 CM
ECHO RV TAPSE: 2.9 CM (ref 1.7–?)
ECHO TV REGURGITANT MAX VELOCITY: 1.8 M/S
ECHO TV REGURGITANT PEAK GRADIENT: 8 MMHG
EOSINOPHIL # BLD: 0.59 K/UL (ref 0–0.4)
EOSINOPHIL NFR BLD: 4 % (ref 0–7)
EPITH CASTS URNS QL MICRO: NORMAL /LPF
ERYTHROCYTE [DISTWIDTH] IN BLOOD BY AUTOMATED COUNT: 13.1 % (ref 11.5–14.5)
ERYTHROCYTE [DISTWIDTH] IN BLOOD BY AUTOMATED COUNT: 13.2 % (ref 11.5–14.5)
GLUCOSE UR STRIP.AUTO-MCNC: NEGATIVE MG/DL
HCT VFR BLD AUTO: 42.8 % (ref 36.6–50.3)
HCT VFR BLD AUTO: 44.3 % (ref 36.6–50.3)
HGB BLD-MCNC: 14.2 G/DL (ref 12.1–17)
HGB BLD-MCNC: 15.4 G/DL (ref 12.1–17)
HGB UR QL STRIP: ABNORMAL
IMM GRANULOCYTES # BLD AUTO: 0.22 K/UL (ref 0–0.04)
IMM GRANULOCYTES NFR BLD AUTO: 1.5 % (ref 0–0.5)
INR PPP: 1.1 (ref 0.9–1.1)
KETONES UR QL STRIP.AUTO: NEGATIVE MG/DL
LEUKOCYTE ESTERASE UR QL STRIP.AUTO: NEGATIVE
LYMPHOCYTES # BLD: 3.59 K/UL (ref 0.8–3.5)
LYMPHOCYTES NFR BLD: 24.4 % (ref 12–49)
MCH RBC QN AUTO: 29.1 PG (ref 26–34)
MCH RBC QN AUTO: 29.7 PG (ref 26–34)
MCHC RBC AUTO-ENTMCNC: 33.2 G/DL (ref 30–36.5)
MCHC RBC AUTO-ENTMCNC: 34.8 G/DL (ref 30–36.5)
MCV RBC AUTO: 85.4 FL (ref 80–99)
MCV RBC AUTO: 87.7 FL (ref 80–99)
MONOCYTES # BLD: 1.44 K/UL (ref 0–1)
MONOCYTES NFR BLD: 9.8 % (ref 5–13)
MUCOUS THREADS URNS QL MICRO: ABNORMAL /LPF
NEUTS SEG # BLD: 8.7 K/UL (ref 1.8–8)
NEUTS SEG NFR BLD: 59.3 % (ref 32–75)
NITRITE UR QL STRIP.AUTO: NEGATIVE
NRBC # BLD: 0 K/UL (ref 0–0.01)
NRBC # BLD: 0 K/UL (ref 0–0.01)
NRBC BLD-RTO: 0 PER 100 WBC
NRBC BLD-RTO: 0 PER 100 WBC
PERFORMED BY:: NORMAL
PH UR STRIP: 6 (ref 5–8)
PLATELET # BLD AUTO: 260 K/UL (ref 150–400)
PLATELET # BLD AUTO: 304 K/UL (ref 150–400)
PMV BLD AUTO: 11.3 FL (ref 8.9–12.9)
PMV BLD AUTO: 11.6 FL (ref 8.9–12.9)
PROT UR STRIP-MCNC: NEGATIVE MG/DL
PROTHROMBIN TIME: 14.1 SEC (ref 11.9–14.6)
RBC # BLD AUTO: 4.88 M/UL (ref 4.1–5.7)
RBC # BLD AUTO: 5.19 M/UL (ref 4.1–5.7)
RBC #/AREA URNS HPF: ABNORMAL /HPF (ref 0–5)
RBC #/AREA URNS HPF: NORMAL /HPF (ref 0–5)
SP GR UR REFRACTOMETRY: 1.02 (ref 1–1.03)
THERAPEUTIC RANGE: NORMAL SEC (ref 82–109)
TROPONIN I SERPL HS-MCNC: 859 NG/L (ref 0–76)
UFH PPP CHRO-ACNC: 0.28 IU/ML
UFH PPP CHRO-ACNC: <0.1 IU/ML
UFH PPP CHRO-ACNC: <0.1 IU/ML
UROBILINOGEN UR QL STRIP.AUTO: 0.1 EU/DL (ref 0.1–1)
WBC # BLD AUTO: 14.7 K/UL (ref 4.1–11.1)
WBC # BLD AUTO: 18.6 K/UL (ref 4.1–11.1)
WBC URNS QL MICRO: ABNORMAL /HPF (ref 0–4)
WBC URNS QL MICRO: NORMAL /HPF (ref 0–4)

## 2025-06-20 PROCEDURE — 2709999900 HC NON-CHARGEABLE SUPPLY: Performed by: INTERNAL MEDICINE

## 2025-06-20 PROCEDURE — C8929 TTE W OR WO FOL WCON,DOPPLER: HCPCS

## 2025-06-20 PROCEDURE — 7100000000 HC PACU RECOVERY - FIRST 15 MIN: Performed by: INTERNAL MEDICINE

## 2025-06-20 PROCEDURE — C1894 INTRO/SHEATH, NON-LASER: HCPCS | Performed by: INTERNAL MEDICINE

## 2025-06-20 PROCEDURE — 6360000002 HC RX W HCPCS: Performed by: NURSE PRACTITIONER

## 2025-06-20 PROCEDURE — C1760 CLOSURE DEV, VASC: HCPCS | Performed by: INTERNAL MEDICINE

## 2025-06-20 PROCEDURE — 85610 PROTHROMBIN TIME: CPT

## 2025-06-20 PROCEDURE — 99152 MOD SED SAME PHYS/QHP 5/>YRS: CPT | Performed by: INTERNAL MEDICINE

## 2025-06-20 PROCEDURE — 85025 COMPLETE CBC W/AUTO DIFF WBC: CPT

## 2025-06-20 PROCEDURE — 4A023N7 MEASUREMENT OF CARDIAC SAMPLING AND PRESSURE, LEFT HEART, PERCUTANEOUS APPROACH: ICD-10-PCS | Performed by: INTERNAL MEDICINE

## 2025-06-20 PROCEDURE — 81001 URINALYSIS AUTO W/SCOPE: CPT

## 2025-06-20 PROCEDURE — 6370000000 HC RX 637 (ALT 250 FOR IP): Performed by: INTERNAL MEDICINE

## 2025-06-20 PROCEDURE — 6360000002 HC RX W HCPCS: Performed by: INTERNAL MEDICINE

## 2025-06-20 PROCEDURE — 87086 URINE CULTURE/COLONY COUNT: CPT

## 2025-06-20 PROCEDURE — 85347 COAGULATION TIME ACTIVATED: CPT

## 2025-06-20 PROCEDURE — 6360000004 HC RX CONTRAST MEDICATION

## 2025-06-20 PROCEDURE — 85027 COMPLETE CBC AUTOMATED: CPT

## 2025-06-20 PROCEDURE — 2580000003 HC RX 258: Performed by: INTERNAL MEDICINE

## 2025-06-20 PROCEDURE — 84484 ASSAY OF TROPONIN QUANT: CPT

## 2025-06-20 PROCEDURE — 7100000001 HC PACU RECOVERY - ADDTL 15 MIN: Performed by: INTERNAL MEDICINE

## 2025-06-20 PROCEDURE — 99153 MOD SED SAME PHYS/QHP EA: CPT | Performed by: INTERNAL MEDICINE

## 2025-06-20 PROCEDURE — 6370000000 HC RX 637 (ALT 250 FOR IP): Performed by: FAMILY MEDICINE

## 2025-06-20 PROCEDURE — 2060000000 HC ICU INTERMEDIATE R&B

## 2025-06-20 PROCEDURE — 6360000002 HC RX W HCPCS: Performed by: FAMILY MEDICINE

## 2025-06-20 PROCEDURE — 93458 L HRT ARTERY/VENTRICLE ANGIO: CPT | Performed by: INTERNAL MEDICINE

## 2025-06-20 PROCEDURE — 85730 THROMBOPLASTIN TIME PARTIAL: CPT

## 2025-06-20 PROCEDURE — 93005 ELECTROCARDIOGRAM TRACING: CPT | Performed by: INTERNAL MEDICINE

## 2025-06-20 PROCEDURE — 2580000003 HC RX 258: Performed by: FAMILY MEDICINE

## 2025-06-20 PROCEDURE — B2151ZZ FLUOROSCOPY OF LEFT HEART USING LOW OSMOLAR CONTRAST: ICD-10-PCS | Performed by: INTERNAL MEDICINE

## 2025-06-20 PROCEDURE — 5A09357 ASSISTANCE WITH RESPIRATORY VENTILATION, LESS THAN 24 CONSECUTIVE HOURS, CONTINUOUS POSITIVE AIRWAY PRESSURE: ICD-10-PCS | Performed by: INTERNAL MEDICINE

## 2025-06-20 PROCEDURE — B2111ZZ FLUOROSCOPY OF MULTIPLE CORONARY ARTERIES USING LOW OSMOLAR CONTRAST: ICD-10-PCS | Performed by: INTERNAL MEDICINE

## 2025-06-20 PROCEDURE — 2500000003 HC RX 250 WO HCPCS: Performed by: FAMILY MEDICINE

## 2025-06-20 PROCEDURE — C1769 GUIDE WIRE: HCPCS | Performed by: INTERNAL MEDICINE

## 2025-06-20 PROCEDURE — 85520 HEPARIN ASSAY: CPT

## 2025-06-20 PROCEDURE — 6360000004 HC RX CONTRAST MEDICATION: Performed by: INTERNAL MEDICINE

## 2025-06-20 DEVICE — DEVICE CLSR ANGIO-SEAL VIP 6FR 0.035IN V TWST INTEGR PLATFRM: Type: IMPLANTABLE DEVICE | Status: FUNCTIONAL

## 2025-06-20 RX ORDER — DIPHENHYDRAMINE HYDROCHLORIDE 50 MG/ML
INJECTION, SOLUTION INTRAMUSCULAR; INTRAVENOUS PRN
Status: DISCONTINUED | OUTPATIENT
Start: 2025-06-20 | End: 2025-06-20 | Stop reason: HOSPADM

## 2025-06-20 RX ORDER — IOPAMIDOL 755 MG/ML
INJECTION, SOLUTION INTRAVASCULAR PRN
Status: DISCONTINUED | OUTPATIENT
Start: 2025-06-20 | End: 2025-06-20 | Stop reason: HOSPADM

## 2025-06-20 RX ORDER — HEPARIN SODIUM 1000 [USP'U]/ML
INJECTION, SOLUTION INTRAVENOUS; SUBCUTANEOUS PRN
Status: DISCONTINUED | OUTPATIENT
Start: 2025-06-20 | End: 2025-06-20 | Stop reason: HOSPADM

## 2025-06-20 RX ORDER — TICAGRELOR 90 MG/1
180 TABLET, FILM COATED ORAL ONCE
Status: COMPLETED | OUTPATIENT
Start: 2025-06-20 | End: 2025-06-20

## 2025-06-20 RX ORDER — VALACYCLOVIR HYDROCHLORIDE 500 MG/1
500 TABLET, FILM COATED ORAL DAILY
COMMUNITY

## 2025-06-20 RX ORDER — ASPIRIN 81 MG/1
81 TABLET ORAL DAILY
Status: DISCONTINUED | OUTPATIENT
Start: 2025-06-20 | End: 2025-06-21 | Stop reason: HOSPADM

## 2025-06-20 RX ORDER — FENTANYL CITRATE 50 UG/ML
INJECTION, SOLUTION INTRAMUSCULAR; INTRAVENOUS PRN
Status: DISCONTINUED | OUTPATIENT
Start: 2025-06-20 | End: 2025-06-20 | Stop reason: HOSPADM

## 2025-06-20 RX ORDER — 0.9 % SODIUM CHLORIDE 0.9 %
INTRAVENOUS SOLUTION INTRAVENOUS CONTINUOUS PRN
Status: COMPLETED | OUTPATIENT
Start: 2025-06-20 | End: 2025-06-20

## 2025-06-20 RX ORDER — PANTOPRAZOLE SODIUM 40 MG/1
40 TABLET, DELAYED RELEASE ORAL DAILY
COMMUNITY

## 2025-06-20 RX ORDER — MIDAZOLAM HYDROCHLORIDE 1 MG/ML
INJECTION, SOLUTION INTRAMUSCULAR; INTRAVENOUS PRN
Status: DISCONTINUED | OUTPATIENT
Start: 2025-06-20 | End: 2025-06-20 | Stop reason: HOSPADM

## 2025-06-20 RX ORDER — CALCIUM CARBONATE 500 MG/1
1000 TABLET, CHEWABLE ORAL 3 TIMES DAILY PRN
Status: DISCONTINUED | OUTPATIENT
Start: 2025-06-20 | End: 2025-06-21 | Stop reason: HOSPADM

## 2025-06-20 RX ORDER — ALLOPURINOL 300 MG/1
300 TABLET ORAL DAILY
COMMUNITY

## 2025-06-20 RX ORDER — MORPHINE SULFATE 2 MG/ML
2 INJECTION, SOLUTION INTRAMUSCULAR; INTRAVENOUS EVERY 4 HOURS PRN
Refills: 0 | Status: DISCONTINUED | OUTPATIENT
Start: 2025-06-20 | End: 2025-06-21 | Stop reason: HOSPADM

## 2025-06-20 RX ORDER — LOSARTAN POTASSIUM AND HYDROCHLOROTHIAZIDE 25; 100 MG/1; MG/1
1 TABLET ORAL DAILY
Status: DISCONTINUED | OUTPATIENT
Start: 2025-06-20 | End: 2025-06-21 | Stop reason: HOSPADM

## 2025-06-20 RX ORDER — SPIRONOLACTONE 25 MG/1
25 TABLET ORAL DAILY
Status: ON HOLD | COMMUNITY
End: 2025-06-30 | Stop reason: HOSPADM

## 2025-06-20 RX ORDER — METHYLPHENIDATE HYDROCHLORIDE 18 MG/1
54 TABLET ORAL EVERY MORNING
COMMUNITY

## 2025-06-20 RX ORDER — LIDOCAINE HYDROCHLORIDE 10 MG/ML
INJECTION, SOLUTION INFILTRATION; PERINEURAL PRN
Status: DISCONTINUED | OUTPATIENT
Start: 2025-06-20 | End: 2025-06-20 | Stop reason: HOSPADM

## 2025-06-20 RX ORDER — NITROGLYCERIN 0.4 MG/1
0.4 TABLET SUBLINGUAL EVERY 5 MIN PRN
Status: DISCONTINUED | OUTPATIENT
Start: 2025-06-20 | End: 2025-06-21 | Stop reason: HOSPADM

## 2025-06-20 RX ORDER — HEPARIN SODIUM 200 [USP'U]/100ML
INJECTION, SOLUTION INTRAVENOUS CONTINUOUS PRN
Status: COMPLETED | OUTPATIENT
Start: 2025-06-20 | End: 2025-06-20

## 2025-06-20 RX ORDER — TICAGRELOR 90 MG/1
90 TABLET, FILM COATED ORAL 2 TIMES DAILY
Status: DISCONTINUED | OUTPATIENT
Start: 2025-06-20 | End: 2025-06-20

## 2025-06-20 RX ADMIN — LOSARTAN POTASSIUM AND HYDROCHLOROTHIAZIDE 1 TABLET: 25; 100 TABLET ORAL at 08:43

## 2025-06-20 RX ADMIN — ATORVASTATIN CALCIUM 40 MG: 40 TABLET, FILM COATED ORAL at 00:55

## 2025-06-20 RX ADMIN — HEPARIN SODIUM AND DEXTROSE 11 UNITS/KG/HR: 10000; 5 INJECTION INTRAVENOUS at 15:02

## 2025-06-20 RX ADMIN — Medication 0.4 MG: at 19:52

## 2025-06-20 RX ADMIN — CARVEDILOL 6.25 MG: 3.12 TABLET, FILM COATED ORAL at 08:41

## 2025-06-20 RX ADMIN — CEFTRIAXONE SODIUM 1000 MG: 1 INJECTION, POWDER, FOR SOLUTION INTRAMUSCULAR; INTRAVENOUS at 23:25

## 2025-06-20 RX ADMIN — TICAGRELOR 180 MG: 90 TABLET ORAL at 13:00

## 2025-06-20 RX ADMIN — SPIRONOLACTONE 25 MG: 25 TABLET ORAL at 08:42

## 2025-06-20 RX ADMIN — MORPHINE SULFATE 2 MG: 2 INJECTION, SOLUTION INTRAMUSCULAR; INTRAVENOUS at 16:33

## 2025-06-20 RX ADMIN — HEPARIN SODIUM 4000 UNITS: 1000 INJECTION INTRAVENOUS; SUBCUTANEOUS at 10:34

## 2025-06-20 RX ADMIN — ASPIRIN 81 MG: 81 TABLET, DELAYED RELEASE ORAL at 13:00

## 2025-06-20 RX ADMIN — SODIUM CHLORIDE: 0.9 INJECTION, SOLUTION INTRAVENOUS at 01:06

## 2025-06-20 RX ADMIN — ASPIRIN 81 MG: 81 TABLET, CHEWABLE ORAL at 08:41

## 2025-06-20 RX ADMIN — SODIUM CHLORIDE, PRESERVATIVE FREE 10 ML: 5 INJECTION INTRAVENOUS at 00:58

## 2025-06-20 RX ADMIN — HEPARIN SODIUM AND DEXTROSE 7 UNITS/KG/HR: 10000; 5 INJECTION INTRAVENOUS at 00:45

## 2025-06-20 RX ADMIN — HEPARIN SODIUM 4000 UNITS: 1000 INJECTION INTRAVENOUS; SUBCUTANEOUS at 00:40

## 2025-06-20 RX ADMIN — SODIUM CHLORIDE, PRESERVATIVE FREE 10 ML: 5 INJECTION INTRAVENOUS at 08:45

## 2025-06-20 RX ADMIN — PANTOPRAZOLE SODIUM 40 MG: 40 TABLET, DELAYED RELEASE ORAL at 08:42

## 2025-06-20 RX ADMIN — SODIUM CHLORIDE, PRESERVATIVE FREE 10 ML: 5 INJECTION INTRAVENOUS at 20:21

## 2025-06-20 RX ADMIN — ATORVASTATIN CALCIUM 40 MG: 40 TABLET, FILM COATED ORAL at 20:21

## 2025-06-20 RX ADMIN — CARVEDILOL 6.25 MG: 3.12 TABLET, FILM COATED ORAL at 16:34

## 2025-06-20 RX ADMIN — HEPARIN SODIUM 2000 UNITS: 1000 INJECTION INTRAVENOUS; SUBCUTANEOUS at 21:58

## 2025-06-20 RX ADMIN — SULFUR HEXAFLUORIDE 2 ML: 60.7; .19; .19 INJECTION, POWDER, LYOPHILIZED, FOR SUSPENSION INTRAVENOUS; INTRAVESICAL at 19:07

## 2025-06-20 RX ADMIN — ALLOPURINOL 300 MG: 300 TABLET ORAL at 08:43

## 2025-06-20 RX ADMIN — CEFTRIAXONE SODIUM 1000 MG: 1 INJECTION, POWDER, FOR SOLUTION INTRAMUSCULAR; INTRAVENOUS at 00:46

## 2025-06-20 RX ADMIN — VALACYCLOVIR HYDROCHLORIDE 500 MG: 500 TABLET, FILM COATED ORAL at 08:45

## 2025-06-20 ASSESSMENT — ENCOUNTER SYMPTOMS
ABDOMINAL PAIN: 0
NAUSEA: 0
CONSTIPATION: 0
SINUS PRESSURE: 0
SHORTNESS OF BREATH: 0
APNEA: 0
COLOR CHANGE: 0
EYES NEGATIVE: 1
RHINORRHEA: 0

## 2025-06-20 ASSESSMENT — PAIN DESCRIPTION - LOCATION
LOCATION: CHEST
LOCATION: BACK

## 2025-06-20 ASSESSMENT — PAIN SCALES - GENERAL
PAINLEVEL_OUTOF10: 1
PAINLEVEL_OUTOF10: 2
PAINLEVEL_OUTOF10: 10
PAINLEVEL_OUTOF10: 0

## 2025-06-20 ASSESSMENT — PAIN DESCRIPTION - ORIENTATION: ORIENTATION: MID

## 2025-06-20 ASSESSMENT — PAIN SCALES - WONG BAKER: WONGBAKER_NUMERICALRESPONSE: NO HURT

## 2025-06-20 ASSESSMENT — PAIN DESCRIPTION - DESCRIPTORS: DESCRIPTORS: TIGHTNESS

## 2025-06-20 NOTE — CONSULTS
Consult    NAME: Miko Toney   :  1975   MRN:  [id number]     Date/Time:  2025 12:44 PM    Patient PCP: Bridger Joshi MD  ________________________________________________________________________      Subjective:   CHIEF COMPLAINT: Chest tightness.    Covering for Dr. Wilson.    HISTORY OF PRESENT ILLNESS:   Chart reviewed.  Patient examined.  Patient has a very strong family history of heart disease and his father had a heart attack and bypass surgery in 30s and subsequently heart transplant at the age of 48 and he  at age of 57.  He stated that he experienced tightness in the retrosternal area and became sweaty and short of breath.  Episode lasted about half an hour and got relieved with the nitroglycerin.  Pain radiated across the chest and he said it was not too bad.  It was not provoked with any activity.           Past Medical History:   Diagnosis Date    Asthma     Hypertension       History reviewed. No pertinent surgical history.  Allergies   Allergen Reactions    Azithromycin Hives      Meds:  See below  Social History     Tobacco Use    Smoking status: Never    Smokeless tobacco: Never   Substance Use Topics    Alcohol use: Never   Negative for smoking, drinking or drugs.    History reviewed. No pertinent family history.     FAMILY HISTORY: Mother had a brain cancer and  at the age of 74 and father had a bypass surgery in her 30s and had a heart transplant at the age of 48 and  at the age of 58.  His brother had a heart attack at the age of 44 and subsequent bypass and stent insertion.    Personal history: Patient about to get  in about a month has 1 child and he works in a computer field.    REVIEW OF SYSTEMS:     []         Unable to obtain  ROS due to ---   [x]         Total of 12 systems reviewed as follows:    Constitutional: negative fever, negative chills, negative weight loss  Eyes:   negative visual changes  ENT:   negative sore throat, tongue

## 2025-06-20 NOTE — ED NOTES
ED TO INPATIENT SBAR HANDOFF    Patient Name: Miko Toney   Preferred Name: Miko  : 1975  49 y.o.   Family/Caregiver Present: no   Code Status Order: Full Code  PO Status: NPO:No  Telemetry Order: Yes  C-SSRS: Risk of Suicide: No Risk  Sitter no    Restraints:     Sepsis Risk Score Sepsis V2 Risk Score: 22.8    Situation  Chief Complaint   Patient presents with    Chest Pain     Brief Description of Patient's Condition: Came in for CP radiating to L arm, SOB, sweating and dizziness for 1hr pta. First trop 16, Second trop 96. EKG was NSR  Mental Status: oriented, alert, coherent, logical, thought processes intact, and able to concentrate and follow conversation  Arrived from:Home  Imaging:   XR CHEST 1 VIEW   Final Result   No acute intrathoracic process is identified.             Electronically signed by YRIS MOJICA        Abnormal labs:   Abnormal Labs Reviewed   CBC WITH AUTO DIFFERENTIAL - Abnormal; Notable for the following components:       Result Value    WBC 20.0 (*)     Basophils % 1.1 (*)     Immature Granulocytes % 1.6 (*)     Neutrophils Absolute 14.46 (*)     Monocytes Absolute 1.62 (*)     Basophils Absolute 0.21 (*)     Immature Granulocytes Absolute 0.32 (*)     All other components within normal limits   COMPREHENSIVE METABOLIC PANEL - Abnormal; Notable for the following components:    Glucose 111 (*)     AST 39 (*)     Albumin/Globulin Ratio 1.0 (*)     All other components within normal limits   TROPONIN - Abnormal; Notable for the following components:    Troponin, High Sensitivity 98 (*)     All other components within normal limits       Background  Allergies:   Allergies   Allergen Reactions    Azithromycin Hives     History:   Past Medical History:   Diagnosis Date    Asthma     Hypertension        Assessment  Vitals:    Level of Consciousness: Alert (0)   Vitals:    25 2045 25 2115 25 2145 25 2215   BP: (!) 153/101 (!) 170/118 (!) 143/102 (!) 155/101    Pulse: 79 79 78 86   Resp: 15 21 16 20   Temp:       TempSrc:       SpO2: 94% 91% 96% 96%   Weight:       Height:         Deterioration Index (DI): Deterioration Index: 23.49  Deterioration Index (DI) Interventions Performed:    O2 Flow Rate:    O2 Device:    Cardiac Rhythm:    Critical Lab Results: [unfilled]  Cultures: Cultures:Blood and Urine  NIH Score: NIH NIH Stroke Scale  NIH Stroke Scale Assessed: Yes  Interval: Baseline  Level of Consciousness (1a): Alert  LOC Questions (1b): Answers both correctly  LOC Commands (1c): Performs both tasks correctly  Best Gaze (2): Normal  Visual (3): No visual loss  Facial Palsy (4): Normal symmetrical movement  Motor Arm, Left (5a): No drift  Motor Arm, Right (5b): No drift  Motor Leg, Left (6a): No drift  Motor Leg, Right (6b): No drift  Limb Ataxia (7): Absent  Sensory (8): Normal  Best Language (9): No aphasia  Dysarthria (10): Normal  Extinction and Inattention (11): No abnormality  Total: 0   Active LDA's:   Peripheral IV Left Antecubital (Active)   Site Assessment Clean, dry & intact 06/19/25 1919   Line Status Blood return noted;Flushed;Normal saline locked 06/19/25 1919   Phlebitis Assessment No symptoms 06/19/25 1919   Infiltration Assessment 0 06/19/25 1919   Dressing Status Clean, dry & intact 06/19/25 1919   Dressing Type Transparent 06/19/25 1919     Active Central Lines:                          Active Wounds:    Active Gibson's:    Active Feeding Tubes:      Administered Medications:   Medications   0.9 % sodium chloride infusion (has no administration in time range)   sodium chloride flush 0.9 % injection 5-40 mL (has no administration in time range)   sodium chloride flush 0.9 % injection 5-40 mL (has no administration in time range)   0.9 % sodium chloride infusion (has no administration in time range)   potassium chloride (KLOR-CON M) extended release tablet 40 mEq (has no administration in time range)     Or   potassium bicarb-citric acid (EFFER-K)

## 2025-06-20 NOTE — PROGRESS NOTES
4 Eyes Skin Assessment     NAME:  Miko Toney  YOB: 1975  MEDICAL RECORD NUMBER:  [id number]    The patient is being assessed for  Admission    I agree that at least one RN has performed a thorough Head to Toe Skin Assessment on the patient. ALL assessment sites listed below have been assessed.      Areas assessed by both nurses:    Head, Face, Ears, Shoulders, Back, Chest, Arms, Elbows, Hands, Sacrum. Buttock, Coccyx, Ischium, Legs. Feet and Heels, and Under Medical Devices         Does the Patient have a Wound? No noted wound(s)       Terry Prevention initiated by RN: No  Wound Care Orders initiated by RN: No    Pressure Injury (Stage 3,4, Unstageable, DTI, NWPT, and Complex wounds) if present, place Wound referral order by RN under : No    New Ostomies, if present place, Ostomy referral order under : No     Nurse 1 eSignature: Electronically signed by Mariya Roche RN on 6/20/25 at 4:36 AM EDT    **SHARE this note so that the co-signing nurse can place an eSignature**    Nurse 2 eSignature: Electronically signed by Lorelei Sánchez RN on 6/20/25 at 5:28 AM EDT

## 2025-06-20 NOTE — PROGRESS NOTES
Hospitalist Progress Note    NAME:   Miko Toney   : 1975   MRN: [id number]     Date/Time: 2025 7:28 AM  Patient PCP: Bridger Joshi MD    Estimated discharge date: 48 hours  Barriers: Urology cleared    Hospital course:  Miko Toney is a 49 y.o.  male with PMHx significant for hypertension, GERD, prediabetes, gout, and ADHD presented to the ER with acute chest pain with tingling in the arms, diaphoresis, and dyspnea onset at rest.  Reports brother had heart attack at 44 years old.  Reports chest pain came on while sitting in a chair around 5 PM today, went away for about an hour and 45 minutes and came back on without relief and decided to come to the ER.  Chest pain was 4 out of 10.  Denies alcohol, tobacco and drug use.  Brother and fiancé accompany him in the ER.      Reports chest pain is gone now with nitroglycerin given in ER.     Chest x-ray negative for acute cardiopulmonary process.  CBC shows leukocytosis at 20.0 WBC. Troponin originally normal at 16, repeat elevated at 98. D-dimer normal. EKG shows no acute ST changes consistent with ischemia.      Admitting due to NSTEMI and leukocytosis.       Assessment / Plan:  NSTEMI  Hypertension    Serial troponins with elevation started at 16 now at 859  Continue heparin drip until cardiology evaluation  Continue nitroglycerin sublingual as needed for chest pain  Continue aspirin 81 mg p.o. tablet   Continue lipitor 40 mg po tablet qd  Continue Coreg losartan, hydrochlorothiazide, spironolactone,  Cardiology expertise pending  Check lipid panel and A1c    Leukocytosis  WBC 20 on admission now at 17  No fever on admission  Continue ceftriaxone  Follow blood cultures      Chronic medical conditions  Prediabetes-no meds check hemoglobin A1c  Gout -check uric acid  GERD-Protonix Tums as needed  ADHD   HSV -continue valacyclovir    Medical Decision Making:   [] High (any 2)    A. Problems (any 1)  [x] Acute/Chronic Illness/injury posing       Rate and Rhythm: Normal rate and regular rhythm.   Pulmonary:      Effort: Pulmonary effort is normal. No respiratory distress.      Breath sounds: No wheezing.   Abdominal:      General: There is no distension.      Palpations: Abdomen is soft.      Tenderness: There is no abdominal tenderness.   Genitourinary:     Comments: Suprapubic catheter in place  Musculoskeletal:         General: No swelling or tenderness.      Cervical back: Normal range of motion.   Skin:     General: Skin is warm and dry.      Capillary Refill: Capillary refill takes 2 to 3 seconds.   Neurological:      Mental Status: He is alert and oriented to person, place, and time.          Reviewed most current lab test results and cultures  YES  Reviewed most current radiology test results   YES  Review and summation of old records today    NO  Reviewed patient's current orders and MAR    YES  PMH/SH reviewed - no change compared to H&P  ________________________________________________________________________  Care Plan discussed with:    Comments   Patient x    Family      RN x    Care Manager     Consultant                        Multidiciplinary team rounds were held today with , nursing, pharmacist and clinical coordinator.  Patient's plan of care was discussed; medications were reviewed and discharge planning was addressed.     ________________________________________________________________________  Total NON critical care TIME:  35  Minutes    Total CRITICAL CARE TIME Spent:   Minutes non procedure based      Comments   >50% of visit spent in counseling and coordination of care     ________________________________________________________________________  ANDRY Demarco NP     Procedures: see electronic medical records for all procedures/Xrays and details which were not copied into this note but were reviewed prior to creation of Plan.      LABS:  I reviewed today's most current labs and imaging  studies.  Pertinent labs include:  Recent Labs     06/19/25 1859 06/20/25 0033   WBC 20.0* 18.6*   HGB 17.0 15.4   HCT 48.7 44.3    304     Recent Labs     06/19/25 1859 06/19/25 2300 06/20/25 0033    139  --    K 3.7 4.0  --     104  --    CO2 24 26  --    BUN 14 12  --    ALT 62 50  --    INR  --   --  1.1       Signed: ANDRY Demarco NP

## 2025-06-20 NOTE — CARE COORDINATION
06/20/25 1129   Service Assessment   Patient Orientation Alert and Oriented   Cognition Alert   History Provided By Patient   Primary Caregiver Self   Support Systems None   Patient's Healthcare Decision Maker is: Legal Next of Kin   PCP Verified by CM Yes   Last Visit to PCP Within last 3 months   Prior Functional Level Independent in ADLs/IADLs   Current Functional Level Independent in ADLs/IADLs   Can patient return to prior living arrangement Yes   Ability to make needs known: Good   Family able to assist with home care needs: Yes   Would you like for me to discuss the discharge plan with any other family members/significant others, and if so, who? No   Financial Resources None   Community Resources None   CM/SW Referral Other (see comment)   Social/Functional History   Lives With Alone   Type of Home House   Home Layout One level   Home Access Level entry   Prior Level of Assist for ADLs Independent   Prior Level of Assist for Homemaking Independent   Ambulation Assistance Independent   Prior Level of Assist for Transfers Independent   Active  Yes   Mode of Transportation Car       Patient currently lives alone in a one story house with level entry, address on chart confirmed, will be getting  in next few months and moving in with wife.    Patient independent at baseline, works full time, drives, requires no DME.    Patient current with listed PCP, uses [facility] [address] [facility].    No current CM DCP needs, CM continues to follow.    Advance Care Planning   Healthcare Decision Maker:      Click here to complete Healthcare Decision Makers including selection of the Healthcare Decision Maker Relationship (ie \"Primary\").

## 2025-06-20 NOTE — H&P
Hospitalist Admission Note    NAME: Miko Toney   :  1975   MRN:  [id number]     Date/Time:  2025 10:46 PM    Patient PCP: Bridger Joshi MD    ______________________________________________________________________  Given the patient's current clinical presentation, I have a high level of concern for decompensation if discharged from the emergency department.  Complex decision making was performed, which includes reviewing the patient's available past medical records, laboratory results, and x-ray films.       My assessment of this patient's clinical condition and my plan of care is as follows.    Assessment / Plan:    NSTEMI  Leukocytosis  Prediabetes  Hypertension  Gout   GERD  ADHD       2238    HECTOR Risk Calculator     Two or More Episodes of Severe Angina within 24 Hours No   Elevated Cardiac Markers Yes   ST Changes > 0.5 mm No   Age 65+ No   3+ CAD Risk Factors No   CAD Stenosis >= 50% No   Aspirin Use in the Past 7 Days No   HECTOR Risk Score (Calculated) 1       Admit to medical telemetry  Consult cardiology for NSTEMI and further workup  Start on heparin drip  Start IV rocephin 1g daily empirically after blood cultures urine culture  Start Nitroglycerin sublingual as needed for chest pain  Start aspirin 81 mg p.o. tablet   Start lipitor 40 mg po tablet qd  Order blood culture and UA with culture due to leukocytosis  Serial HS troponin measurements.    Continue home medications.    Current Facility-Administered Medications   Medication Dose Route Frequency Provider Last Rate Last Admin    0.9 % sodium chloride infusion   IntraVENous Continuous Reena Scott MD        sodium chloride flush 0.9 % injection 5-40 mL  5-40 mL IntraVENous 2 times per day Reena Scott MD        sodium chloride flush 0.9 % injection 5-40 mL  5-40 mL IntraVENous PRN Reena Scott MD        0.9 % sodium chloride infusion   IntraVENous PRN Reena Scott MD        potassium chloride (KLOR-CON  3.5 - 5.1 mmol/L    Chloride 106 97 - 108 mmol/L    CO2 24 21 - 32 mmol/L    Anion Gap 7 2 - 12 mmol/L    Glucose 111 (H) 65 - 100 mg/dL    BUN 14 6 - 20 mg/dL    Creatinine 0.98 0.70 - 1.30 mg/dL    BUN/Creatinine Ratio 14 12 - 20      Est, Glom Filt Rate >90 >60 ml/min/1.73m2    Calcium 9.3 8.5 - 10.1 mg/dL    Total Bilirubin 0.6 0.2 - 1.0 mg/dL    AST 39 (H) 15 - 37 U/L    ALT 62 12 - 78 U/L    Alk Phosphatase 64 45 - 117 U/L    Total Protein 7.5 6.4 - 8.2 g/dL    Albumin 3.8 3.5 - 5.0 g/dL    Globulin 3.7 2.0 - 4.0 g/dL    Albumin/Globulin Ratio 1.0 (L) 1.1 - 2.2     D-Dimer, Quantitative    Collection Time: 06/19/25  9:19 PM   Result Value Ref Range    D-Dimer, Quant <0.27 <0.50 ug/ml(FEU)   Troponin    Collection Time: 06/19/25  9:19 PM   Result Value Ref Range    Troponin, High Sensitivity 98 (H) 0 - 76 ng/L         Imaging Results (most recent):  XR CHEST 1 VIEW  Result Date: 6/19/2025  INDICATION:   Chest Pain COMPARISON: 2015 FINDINGS: AP portable upright view of the chest demonstrates a stable cardiopericardial silhouette.There is no pleural effusion.There is no focal consolidation.There is no pneumothorax.     No acute intrathoracic process is identified. Electronically signed by YRIS MOJICA        _______________________________________________________________________    TOTAL TIME:  50 Minutes    Critical Care Provided     Minutes non procedure based    Signed: Reena Scott MD    Procedures: see electronic medical records for all procedures/Xrays and details which were not copied into this note but were reviewed prior to creation of Plan.

## 2025-06-20 NOTE — PROGRESS NOTES
Received Order for Telemetry     Miko Toney   1975   [id number]   Chest pain [R07.9]  Elevated troponin [R79.89]  Chest pain, unspecified type [R07.9]   Reena Scott MD     Tele Box # [id number] placed on patient at 2340 pm  ER Room # 5  Admitting to Room 473  Transferring Nurse tova  Verified with Primary Nurse vi at 2355 pm

## 2025-06-20 NOTE — PLAN OF CARE
Problem: Discharge Planning  Goal: Discharge to home or other facility with appropriate resources  6/20/2025 1119 by Lorelei Mcintosh, RN  Outcome: Progressing  6/20/2025 0332 by Mariya Roche, RN  Outcome: Progressing     Problem: Pain  Goal: Verbalizes/displays adequate comfort level or baseline comfort level  6/20/2025 1119 by Lorelei Mcintosh, RN  Outcome: Progressing  6/20/2025 0332 by Mariya Roche, RN  Outcome: Progressing

## 2025-06-20 NOTE — ED PROVIDER NOTES
[facility] EMERGENCY DEPT  EMERGENCY DEPARTMENT HISTORY AND PHYSICAL EXAM      Date of evaluation: 6/19/2025  Patient Name: Miko Toney  Birthdate 1975  MRN: [id number]  ED Provider: Spencer Horn MD   Note Started: 9:15 PM EDT 6/19/25    HISTORY OF PRESENT ILLNESS     Chief Complaint   Patient presents with    Chest Pain       History Provided By: Patient, only     HPI: Miko Toney is a 49 y.o. male presents to Emergency Department for evaluation of chest pain.  Patient states that he was seated in chair when suddenly he felt midsternal pressure-like pain with radiation to left shoulder and left arm with tingling down left arm.  Patient states that pain has been ongoing since episode no significant improvement.  Patient states he additionally had associated diaphoresis.  Denies any known history of cardiac disease however states his family has extensive cardiac disease with multiple MIs a family emergency in the 40s.  Patient denies any fevers chills, no cough, no shortness of breath however states he does have pain on deep inspiration.    PAST MEDICAL HISTORY   Past Medical History:  Past Medical History:   Diagnosis Date    Asthma     Hypertension        Past Surgical History:  History reviewed. No pertinent surgical history.    Family History:  History reviewed. No pertinent family history.    Social History:  Social History     Tobacco Use    Smoking status: Never    Smokeless tobacco: Never   Substance Use Topics    Alcohol use: Never    Drug use: Never       Allergies:  Allergies   Allergen Reactions    Azithromycin Hives       PCP: Bridger Joshi MD    Current Meds:   No current facility-administered medications for this encounter.     Current Outpatient Medications   Medication Sig Dispense Refill    cyclobenzaprine (FLEXERIL) 10 MG tablet Take 10 mg by mouth 3 times daily as needed      naproxen (NAPROSYN) 500 MG tablet Take 500 mg by mouth 2 times daily (with meals)         Social

## 2025-06-20 NOTE — H&P
Hospitalist Admission Note    NAME: Miko Toney   :  1975   MRN:  [id number]     Date/Time:  2025 10:28 PM    Patient PCP: Bridger Joshi MD    ______________________________________________________________________  Given the patient's current clinical presentation, I have a high level of concern for decompensation if discharged from the emergency department.  Complex decision making was performed, which includes reviewing the patient's available past medical records, laboratory results, and x-ray films.       My assessment of this patient's clinical condition and my plan of care is as follows.    Assessment / Plan:    NSTEMI  Leukocytosis  Prediabetes  Hypertension  Gout   GERD  ADHD     Admit to medical telemetry  Consult cardiology for NSTEMI and further workup  Start IV rocephin 1g   Start Nitroglycerin sublingual as needed for chest pain  Start aspirin 325 mg p.o. tablet   Start lipitor 40 mg po tablet qd  Order blood culture and UA with culture due to leukocytosis  Serial HS troponin measurements.    Continue home medications.    Current Facility-Administered Medications   Medication Dose Route Frequency Provider Last Rate Last Admin    0.9 % sodium chloride infusion   IntraVENous Continuous Reena Scott MD        sodium chloride flush 0.9 % injection 5-40 mL  5-40 mL IntraVENous 2 times per day Reena Scott MD        sodium chloride flush 0.9 % injection 5-40 mL  5-40 mL IntraVENous PRN Reena Scott MD        0.9 % sodium chloride infusion   IntraVENous PRN Reena Scott MD        potassium chloride (KLOR-CON M) extended release tablet 40 mEq  40 mEq Oral PRN Reena Scott MD        Or    potassium bicarb-citric acid (EFFER-K) effervescent tablet 40 mEq  40 mEq Oral PRReena Rosario MD        Or    potassium chloride 10 mEq/100 mL IVPB (Peripheral Line)  10 mEq IntraVENous PRN Reena Scott MD        magnesium sulfate 2000 mg in 50 mL IVPB premix   ventricular complexes are no longer Present  Confirmed by LACI LOZADA MD ([id number]) on 6/19/2025 9:18:13 PM     CBC with Auto Differential    Collection Time: 06/19/25  6:59 PM   Result Value Ref Range    WBC 20.0 (H) 4.1 - 11.1 K/uL    RBC 5.67 4.10 - 5.70 M/uL    Hemoglobin 17.0 12.1 - 17.0 g/dL    Hematocrit 48.7 36.6 - 50.3 %    MCV 85.9 80.0 - 99.0 FL    MCH 30.0 26.0 - 34.0 PG    MCHC 34.9 30.0 - 36.5 g/dL    RDW 13.0 11.5 - 14.5 %    Platelets 310 150 - 400 K/uL    MPV 10.9 8.9 - 12.9 FL    Nucleated RBCs 0.0 0.0  WBC    nRBC 0.00 0.00 - 0.01 K/uL    Neutrophils % 72.3 32.0 - 75.0 %    Lymphocytes % 15.6 12.0 - 49.0 %    Monocytes % 8.1 5.0 - 13.0 %    Eosinophils % 1.3 0.0 - 7.0 %    Basophils % 1.1 (H) 0.0 - 1.0 %    Immature Granulocytes % 1.6 (H) 0 - 0.5 %    Neutrophils Absolute 14.46 (H) 1.80 - 8.00 K/UL    Lymphocytes Absolute 3.11 0.80 - 3.50 K/UL    Monocytes Absolute 1.62 (H) 0.00 - 1.00 K/UL    Eosinophils Absolute 0.26 0.00 - 0.40 K/UL    Basophils Absolute 0.21 (H) 0.00 - 0.10 K/UL    Immature Granulocytes Absolute 0.32 (H) 0.00 - 0.04 K/UL    Differential Type AUTOMATED     Troponin    Collection Time: 06/19/25  6:59 PM   Result Value Ref Range    Troponin, High Sensitivity 16 0 - 76 ng/L   Comprehensive Metabolic Panel    Collection Time: 06/19/25  6:59 PM   Result Value Ref Range    Sodium 137 136 - 145 mmol/L    Potassium 3.7 3.5 - 5.1 mmol/L    Chloride 106 97 - 108 mmol/L    CO2 24 21 - 32 mmol/L    Anion Gap 7 2 - 12 mmol/L    Glucose 111 (H) 65 - 100 mg/dL    BUN 14 6 - 20 mg/dL    Creatinine 0.98 0.70 - 1.30 mg/dL    BUN/Creatinine Ratio 14 12 - 20      Est, Glom Filt Rate >90 >60 ml/min/1.73m2    Calcium 9.3 8.5 - 10.1 mg/dL    Total Bilirubin 0.6 0.2 - 1.0 mg/dL    AST 39 (H) 15 - 37 U/L    ALT 62 12 - 78 U/L    Alk Phosphatase 64 45 - 117 U/L    Total Protein 7.5 6.4 - 8.2 g/dL    Albumin 3.8 3.5 - 5.0 g/dL    Globulin 3.7 2.0 - 4.0 g/dL    Albumin/Globulin Ratio 1.0 (L) 1.1 -  2.2     D-Dimer, Quantitative    Collection Time: 06/19/25  9:19 PM   Result Value Ref Range    D-Dimer, Quant <0.27 <0.50 ug/ml(FEU)   Troponin    Collection Time: 06/19/25  9:19 PM   Result Value Ref Range    Troponin, High Sensitivity 98 (H) 0 - 76 ng/L         Imaging Results (most recent):  XR CHEST 1 VIEW  Result Date: 6/19/2025  INDICATION:   Chest Pain COMPARISON: 2015 FINDINGS: AP portable upright view of the chest demonstrates a stable cardiopericardial silhouette.There is no pleural effusion.There is no focal consolidation.There is no pneumothorax.     No acute intrathoracic process is identified. Electronically signed by YRIS MOJICA        _______________________________________________________________________    TOTAL TIME:  50 Minutes    Critical Care Provided     Minutes non procedure based    Signed: Lesvia Monroe    Procedures: see electronic medical records for all procedures/Xrays and details which were not copied into this note but were reviewed prior to creation of Plan.

## 2025-06-20 NOTE — PROGRESS NOTES
Patient transferred to room 473 via bed in stable condition.  Bedside report given, SBAR reviewed, sites viewed. Patients family at the bedside.

## 2025-06-20 NOTE — PROGRESS NOTES
Pt adamant about taking own home medications and not paying for medications that he already has. Nurse educated patient on the importance and risks associated with taking unauthorized home medications. Nurse consulted with provider and he directed nurse to nursing sup. Nurse spoke with Nursing sup and was told as long as the medication is in prescribed medication bottle patient can take home medications as long as they are checked and approved through pharmacy. Meds checked and approved through pharmacy.

## 2025-06-21 ENCOUNTER — HOSPITAL ENCOUNTER (INPATIENT)
Facility: HOSPITAL | Age: 50
LOS: 9 days | Discharge: HOME | DRG: 280 | End: 2025-06-30
Attending: STUDENT IN AN ORGANIZED HEALTH CARE EDUCATION/TRAINING PROGRAM | Admitting: STUDENT IN AN ORGANIZED HEALTH CARE EDUCATION/TRAINING PROGRAM
Payer: COMMERCIAL

## 2025-06-21 VITALS
WEIGHT: 298.5 LBS | HEIGHT: 72 IN | OXYGEN SATURATION: 95 % | SYSTOLIC BLOOD PRESSURE: 136 MMHG | RESPIRATION RATE: 16 BRPM | DIASTOLIC BLOOD PRESSURE: 96 MMHG | BODY MASS INDEX: 40.43 KG/M2 | TEMPERATURE: 98.1 F | HEART RATE: 82 BPM

## 2025-06-21 DIAGNOSIS — Z95.1 S/P CABG X 4: ICD-10-CM

## 2025-06-21 DIAGNOSIS — I25.10 CAD, MULTIPLE VESSEL: Primary | ICD-10-CM

## 2025-06-21 DIAGNOSIS — I24.9 ACS (ACUTE CORONARY SYNDROME) (HCC): ICD-10-CM

## 2025-06-21 PROBLEM — R79.89 ELEVATED TROPONIN I LEVEL: Status: ACTIVE | Noted: 2025-06-21

## 2025-06-21 PROBLEM — I21.4 NSTEMI (NON-ST ELEVATED MYOCARDIAL INFARCTION) (HCC): Status: ACTIVE | Noted: 2025-06-21

## 2025-06-21 PROBLEM — R73.03 PRE-DIABETES: Chronic | Status: ACTIVE | Noted: 2025-06-21

## 2025-06-21 PROBLEM — I49.3 VENTRICULAR PREMATURE DEPOLARIZATION: Status: RESOLVED | Noted: 2025-06-21 | Resolved: 2025-06-21

## 2025-06-21 PROBLEM — I10 PRIMARY HYPERTENSION: Chronic | Status: ACTIVE | Noted: 2025-06-21

## 2025-06-21 PROBLEM — M10.9 GOUT: Status: RESOLVED | Noted: 2025-06-21 | Resolved: 2025-06-21

## 2025-06-21 PROBLEM — E78.2 MIXED HYPERLIPIDEMIA: Status: RESOLVED | Noted: 2025-06-21 | Resolved: 2025-06-21

## 2025-06-21 PROBLEM — K21.9 GASTROESOPHAGEAL REFLUX DISEASE WITHOUT ESOPHAGITIS: Status: RESOLVED | Noted: 2025-06-21 | Resolved: 2025-06-21

## 2025-06-21 PROBLEM — A60.00 GENITAL HERPES SIMPLEX: Status: RESOLVED | Noted: 2025-06-21 | Resolved: 2025-06-21

## 2025-06-21 PROBLEM — G47.26 CIRCADIAN RHYTHM SLEEP DISORDER, SHIFT WORK TYPE: Status: RESOLVED | Noted: 2025-06-21 | Resolved: 2025-06-21

## 2025-06-21 PROBLEM — M17.9 OSTEOARTHRITIS OF KNEE: Status: RESOLVED | Noted: 2025-06-21 | Resolved: 2025-06-21

## 2025-06-21 PROBLEM — Z86.69 HISTORY OF GUILLAIN-BARRE SYNDROME: Status: RESOLVED | Noted: 2025-06-21 | Resolved: 2025-06-21

## 2025-06-21 PROBLEM — G47.33 OBSTRUCTIVE SLEEP APNEA: Status: RESOLVED | Noted: 2025-06-21 | Resolved: 2025-06-21

## 2025-06-21 PROBLEM — J30.1 ALLERGIC RHINITIS DUE TO POLLEN: Status: RESOLVED | Noted: 2025-06-21 | Resolved: 2025-06-21

## 2025-06-21 PROBLEM — J45.20 MILD INTERMITTENT ASTHMA: Status: RESOLVED | Noted: 2025-06-21 | Resolved: 2025-06-21

## 2025-06-21 PROBLEM — F90.9 ADHD (ATTENTION DEFICIT HYPERACTIVITY DISORDER): Chronic | Status: ACTIVE | Noted: 2025-06-21

## 2025-06-21 LAB
ALBUMIN SERPL-MCNC: 3 G/DL (ref 3.5–5)
ALBUMIN/GLOB SERPL: 0.9 (ref 1.1–2.2)
ALP SERPL-CCNC: 55 U/L (ref 45–117)
ALT SERPL-CCNC: 48 U/L (ref 12–78)
ANION GAP SERPL CALC-SCNC: 7 MMOL/L (ref 2–12)
AST SERPL W P-5'-P-CCNC: 43 U/L (ref 15–37)
BACTERIA SPEC CULT: NORMAL
BILIRUB SERPL-MCNC: 0.3 MG/DL (ref 0.2–1)
BUN SERPL-MCNC: 13 MG/DL (ref 6–20)
BUN/CREAT SERPL: 14 (ref 12–20)
CA-I BLD-MCNC: 8.6 MG/DL (ref 8.5–10.1)
CHLORIDE SERPL-SCNC: 108 MMOL/L (ref 97–108)
CHOLEST SERPL-MCNC: 116 MG/DL
CO2 SERPL-SCNC: 24 MMOL/L (ref 21–32)
CREAT SERPL-MCNC: 0.9 MG/DL (ref 0.7–1.3)
EKG ATRIAL RATE: 65 BPM
EKG ATRIAL RATE: 72 BPM
EKG DIAGNOSIS: NORMAL
EKG DIAGNOSIS: NORMAL
EKG P AXIS: 13 DEGREES
EKG P AXIS: 14 DEGREES
EKG P-R INTERVAL: 180 MS
EKG P-R INTERVAL: 194 MS
EKG Q-T INTERVAL: 392 MS
EKG Q-T INTERVAL: 402 MS
EKG QRS DURATION: 78 MS
EKG QRS DURATION: 80 MS
EKG QTC CALCULATION (BAZETT): 418 MS
EKG QTC CALCULATION (BAZETT): 429 MS
EKG R AXIS: -15 DEGREES
EKG R AXIS: -17 DEGREES
EKG T AXIS: 18 DEGREES
EKG T AXIS: 45 DEGREES
EKG VENTRICULAR RATE: 65 BPM
EKG VENTRICULAR RATE: 72 BPM
ERYTHROCYTE [DISTWIDTH] IN BLOOD BY AUTOMATED COUNT: 13.2 % (ref 11.5–14.5)
EST. AVERAGE GLUCOSE BLD GHB EST-MCNC: 128 MG/DL
GLOBULIN SER CALC-MCNC: 3.2 G/DL (ref 2–4)
GLUCOSE SERPL-MCNC: 134 MG/DL (ref 65–100)
HBA1C MFR BLD: 6.1 % (ref 4–5.6)
HCT VFR BLD AUTO: 41.3 % (ref 36.6–50.3)
HDLC SERPL-MCNC: 33 MG/DL
HDLC SERPL: 3.5 (ref 0–5)
HGB BLD-MCNC: 13.9 G/DL (ref 12.1–17)
LDLC SERPL CALC-MCNC: 17.4 MG/DL (ref 0–100)
LIPID PANEL: ABNORMAL
Lab: NORMAL
MAGNESIUM SERPL-MCNC: 1.8 MG/DL (ref 1.6–2.4)
MCH RBC QN AUTO: 29.8 PG (ref 26–34)
MCHC RBC AUTO-ENTMCNC: 33.7 G/DL (ref 30–36.5)
MCV RBC AUTO: 88.6 FL (ref 80–99)
NRBC # BLD: 0 K/UL (ref 0–0.01)
NRBC BLD-RTO: 0 PER 100 WBC
PLATELET # BLD AUTO: 250 K/UL (ref 150–400)
PMV BLD AUTO: 11.5 FL (ref 8.9–12.9)
POTASSIUM SERPL-SCNC: 3.6 MMOL/L (ref 3.5–5.1)
PROT SERPL-MCNC: 6.2 G/DL (ref 6.4–8.2)
RBC # BLD AUTO: 4.66 M/UL (ref 4.1–5.7)
SODIUM SERPL-SCNC: 139 MMOL/L (ref 136–145)
TRIGL SERPL-MCNC: 328 MG/DL
TROPONIN I SERPL HS-MCNC: 655 NG/L (ref 0–76)
UFH PPP CHRO-ACNC: 0.34 IU/ML
UFH PPP CHRO-ACNC: 0.45 IU/ML
VLDLC SERPL CALC-MCNC: 65.6 MG/DL
WBC # BLD AUTO: 15.4 K/UL (ref 4.1–11.1)

## 2025-06-21 PROCEDURE — 2060000000 HC ICU INTERMEDIATE R&B

## 2025-06-21 PROCEDURE — 83036 HEMOGLOBIN GLYCOSYLATED A1C: CPT

## 2025-06-21 PROCEDURE — 84484 ASSAY OF TROPONIN QUANT: CPT

## 2025-06-21 PROCEDURE — 6360000002 HC RX W HCPCS: Performed by: STUDENT IN AN ORGANIZED HEALTH CARE EDUCATION/TRAINING PROGRAM

## 2025-06-21 PROCEDURE — 2500000003 HC RX 250 WO HCPCS: Performed by: FAMILY MEDICINE

## 2025-06-21 PROCEDURE — 6370000000 HC RX 637 (ALT 250 FOR IP): Performed by: INTERNAL MEDICINE

## 2025-06-21 PROCEDURE — 83735 ASSAY OF MAGNESIUM: CPT

## 2025-06-21 PROCEDURE — 80053 COMPREHEN METABOLIC PANEL: CPT

## 2025-06-21 PROCEDURE — 85027 COMPLETE CBC AUTOMATED: CPT

## 2025-06-21 PROCEDURE — 6360000002 HC RX W HCPCS: Performed by: FAMILY MEDICINE

## 2025-06-21 PROCEDURE — 6370000000 HC RX 637 (ALT 250 FOR IP): Performed by: FAMILY MEDICINE

## 2025-06-21 PROCEDURE — 93005 ELECTROCARDIOGRAM TRACING: CPT | Performed by: NURSE PRACTITIONER

## 2025-06-21 PROCEDURE — 36415 COLL VENOUS BLD VENIPUNCTURE: CPT

## 2025-06-21 PROCEDURE — 85520 HEPARIN ASSAY: CPT

## 2025-06-21 PROCEDURE — 6370000000 HC RX 637 (ALT 250 FOR IP): Performed by: STUDENT IN AN ORGANIZED HEALTH CARE EDUCATION/TRAINING PROGRAM

## 2025-06-21 PROCEDURE — 80061 LIPID PANEL: CPT

## 2025-06-21 RX ORDER — ALLOPURINOL 300 MG/1
300 TABLET ORAL DAILY
Status: CANCELLED | OUTPATIENT
Start: 2025-06-22

## 2025-06-21 RX ORDER — MORPHINE SULFATE 2 MG/ML
2 INJECTION, SOLUTION INTRAMUSCULAR; INTRAVENOUS EVERY 4 HOURS PRN
Status: CANCELLED | OUTPATIENT
Start: 2025-06-21

## 2025-06-21 RX ORDER — HEPARIN SODIUM 1000 [USP'U]/ML
4000 INJECTION, SOLUTION INTRAVENOUS; SUBCUTANEOUS PRN
Status: DISCONTINUED | OUTPATIENT
Start: 2025-06-21 | End: 2025-06-26

## 2025-06-21 RX ORDER — LOSARTAN POTASSIUM AND HYDROCHLOROTHIAZIDE 25; 100 MG/1; MG/1
1 TABLET ORAL DAILY
Status: ON HOLD | COMMUNITY
End: 2025-06-30 | Stop reason: HOSPADM

## 2025-06-21 RX ORDER — POTASSIUM CHLORIDE 7.45 MG/ML
10 INJECTION INTRAVENOUS PRN
Status: DISCONTINUED | OUTPATIENT
Start: 2025-06-21 | End: 2025-06-26

## 2025-06-21 RX ORDER — PANTOPRAZOLE SODIUM 40 MG/1
40 TABLET, DELAYED RELEASE ORAL
Status: DISCONTINUED | OUTPATIENT
Start: 2025-06-22 | End: 2025-06-24

## 2025-06-21 RX ORDER — MAGNESIUM SULFATE IN WATER 40 MG/ML
2000 INJECTION, SOLUTION INTRAVENOUS PRN
Status: DISCONTINUED | OUTPATIENT
Start: 2025-06-21 | End: 2025-06-26

## 2025-06-21 RX ORDER — CALCIUM CARBONATE 500 MG/1
1000 TABLET, CHEWABLE ORAL 3 TIMES DAILY PRN
Status: CANCELLED | OUTPATIENT
Start: 2025-06-21

## 2025-06-21 RX ORDER — ONDANSETRON 2 MG/ML
4 INJECTION INTRAMUSCULAR; INTRAVENOUS EVERY 6 HOURS PRN
Status: CANCELLED | OUTPATIENT
Start: 2025-06-21

## 2025-06-21 RX ORDER — SODIUM CHLORIDE 9 MG/ML
INJECTION, SOLUTION INTRAVENOUS CONTINUOUS
Status: CANCELLED | OUTPATIENT
Start: 2025-06-21

## 2025-06-21 RX ORDER — POTASSIUM CHLORIDE 1500 MG/1
40 TABLET, EXTENDED RELEASE ORAL PRN
Status: DISCONTINUED | OUTPATIENT
Start: 2025-06-21 | End: 2025-06-26

## 2025-06-21 RX ORDER — SPIRONOLACTONE 25 MG/1
25 TABLET ORAL DAILY
Status: DISCONTINUED | OUTPATIENT
Start: 2025-06-22 | End: 2025-06-23 | Stop reason: SDUPTHER

## 2025-06-21 RX ORDER — SENNA AND DOCUSATE SODIUM 50; 8.6 MG/1; MG/1
2 TABLET, FILM COATED ORAL NIGHTLY
Status: DISCONTINUED | OUTPATIENT
Start: 2025-06-21 | End: 2025-06-26

## 2025-06-21 RX ORDER — ASPIRIN 81 MG/1
81 TABLET ORAL DAILY
Status: DISCONTINUED | OUTPATIENT
Start: 2025-06-22 | End: 2025-06-30 | Stop reason: HOSPADM

## 2025-06-21 RX ORDER — ACETAMINOPHEN 650 MG/1
650 SUPPOSITORY RECTAL EVERY 6 HOURS PRN
Status: CANCELLED | OUTPATIENT
Start: 2025-06-21

## 2025-06-21 RX ORDER — HEPARIN SODIUM 10000 [USP'U]/100ML
5-30 INJECTION, SOLUTION INTRAVENOUS CONTINUOUS
Status: DISCONTINUED | OUTPATIENT
Start: 2025-06-21 | End: 2025-06-21 | Stop reason: SDUPTHER

## 2025-06-21 RX ORDER — SPIRONOLACTONE 25 MG/1
25 TABLET ORAL DAILY
Status: CANCELLED | OUTPATIENT
Start: 2025-06-22

## 2025-06-21 RX ORDER — SODIUM CHLORIDE 9 MG/ML
INJECTION, SOLUTION INTRAVENOUS PRN
Status: CANCELLED | OUTPATIENT
Start: 2025-06-21

## 2025-06-21 RX ORDER — CARVEDILOL 6.25 MG/1
6.25 TABLET ORAL 2 TIMES DAILY WITH MEALS
Status: DISCONTINUED | OUTPATIENT
Start: 2025-06-22 | End: 2025-06-26

## 2025-06-21 RX ORDER — ATORVASTATIN CALCIUM 40 MG/1
40 TABLET, FILM COATED ORAL NIGHTLY
Status: CANCELLED | OUTPATIENT
Start: 2025-06-21

## 2025-06-21 RX ORDER — LOSARTAN POTASSIUM 100 MG/1
100 TABLET ORAL DAILY
Status: DISCONTINUED | OUTPATIENT
Start: 2025-06-22 | End: 2025-06-24

## 2025-06-21 RX ORDER — ASPIRIN 81 MG/1
81 TABLET ORAL DAILY
Status: CANCELLED | OUTPATIENT
Start: 2025-06-22

## 2025-06-21 RX ORDER — POTASSIUM CHLORIDE 1500 MG/1
40 TABLET, EXTENDED RELEASE ORAL PRN
Status: CANCELLED | OUTPATIENT
Start: 2025-06-21

## 2025-06-21 RX ORDER — ONDANSETRON 4 MG/1
4 TABLET, ORALLY DISINTEGRATING ORAL EVERY 8 HOURS PRN
Status: CANCELLED | OUTPATIENT
Start: 2025-06-21

## 2025-06-21 RX ORDER — SODIUM CHLORIDE 0.9 % (FLUSH) 0.9 %
5-40 SYRINGE (ML) INJECTION PRN
Status: CANCELLED | OUTPATIENT
Start: 2025-06-21

## 2025-06-21 RX ORDER — SODIUM CHLORIDE 0.9 % (FLUSH) 0.9 %
5-40 SYRINGE (ML) INJECTION EVERY 12 HOURS SCHEDULED
Status: CANCELLED | OUTPATIENT
Start: 2025-06-21

## 2025-06-21 RX ORDER — ATORVASTATIN CALCIUM 40 MG/1
40 TABLET, FILM COATED ORAL NIGHTLY
Status: DISCONTINUED | OUTPATIENT
Start: 2025-06-21 | End: 2025-06-30 | Stop reason: HOSPADM

## 2025-06-21 RX ORDER — ONDANSETRON 2 MG/ML
4 INJECTION INTRAMUSCULAR; INTRAVENOUS EVERY 6 HOURS PRN
Status: DISCONTINUED | OUTPATIENT
Start: 2025-06-21 | End: 2025-06-26

## 2025-06-21 RX ORDER — NITROGLYCERIN 0.4 MG/1
0.4 TABLET SUBLINGUAL EVERY 5 MIN PRN
Status: DISCONTINUED | OUTPATIENT
Start: 2025-06-21 | End: 2025-06-26

## 2025-06-21 RX ORDER — CARVEDILOL 3.12 MG/1
6.25 TABLET ORAL 2 TIMES DAILY WITH MEALS
Status: CANCELLED | OUTPATIENT
Start: 2025-06-22

## 2025-06-21 RX ORDER — HEPARIN SODIUM 10000 [USP'U]/100ML
5-30 INJECTION, SOLUTION INTRAVENOUS CONTINUOUS
Status: CANCELLED | OUTPATIENT
Start: 2025-06-21

## 2025-06-21 RX ORDER — HYDROCHLOROTHIAZIDE 25 MG/1
25 TABLET ORAL DAILY
Status: DISCONTINUED | OUTPATIENT
Start: 2025-06-22 | End: 2025-06-24

## 2025-06-21 RX ORDER — VALACYCLOVIR HYDROCHLORIDE 500 MG/1
500 TABLET, FILM COATED ORAL 2 TIMES DAILY
Status: CANCELLED | OUTPATIENT
Start: 2025-06-21

## 2025-06-21 RX ORDER — PANTOPRAZOLE SODIUM 40 MG/1
40 TABLET, DELAYED RELEASE ORAL
Status: CANCELLED | OUTPATIENT
Start: 2025-06-22

## 2025-06-21 RX ORDER — HEPARIN SODIUM 10000 [USP'U]/100ML
5-30 INJECTION, SOLUTION INTRAVENOUS CONTINUOUS
Status: DISCONTINUED | OUTPATIENT
Start: 2025-06-21 | End: 2025-06-25

## 2025-06-21 RX ORDER — MORPHINE SULFATE 4 MG/ML
4 INJECTION, SOLUTION INTRAMUSCULAR; INTRAVENOUS
Refills: 0 | Status: DISCONTINUED | OUTPATIENT
Start: 2025-06-21 | End: 2025-06-26

## 2025-06-21 RX ORDER — HEPARIN SODIUM 1000 [USP'U]/ML
4000 INJECTION, SOLUTION INTRAVENOUS; SUBCUTANEOUS PRN
Status: CANCELLED | OUTPATIENT
Start: 2025-06-21

## 2025-06-21 RX ORDER — HEPARIN SODIUM 1000 [USP'U]/ML
2000 INJECTION, SOLUTION INTRAVENOUS; SUBCUTANEOUS PRN
Status: CANCELLED | OUTPATIENT
Start: 2025-06-21

## 2025-06-21 RX ORDER — POTASSIUM CHLORIDE 7.45 MG/ML
10 INJECTION INTRAVENOUS PRN
Status: CANCELLED | OUTPATIENT
Start: 2025-06-21

## 2025-06-21 RX ORDER — POLYETHYLENE GLYCOL 3350 17 G/17G
17 POWDER, FOR SOLUTION ORAL DAILY PRN
Status: DISCONTINUED | OUTPATIENT
Start: 2025-06-21 | End: 2025-06-26

## 2025-06-21 RX ORDER — VALACYCLOVIR HYDROCHLORIDE 500 MG/1
500 TABLET, FILM COATED ORAL DAILY
Status: DISCONTINUED | OUTPATIENT
Start: 2025-06-22 | End: 2025-06-23 | Stop reason: SDUPTHER

## 2025-06-21 RX ORDER — HEPARIN SODIUM 1000 [USP'U]/ML
2000 INJECTION, SOLUTION INTRAVENOUS; SUBCUTANEOUS PRN
Status: DISCONTINUED | OUTPATIENT
Start: 2025-06-21 | End: 2025-06-26

## 2025-06-21 RX ORDER — ACETAMINOPHEN 325 MG/1
650 TABLET ORAL EVERY 6 HOURS PRN
Status: CANCELLED | OUTPATIENT
Start: 2025-06-21

## 2025-06-21 RX ORDER — ONDANSETRON 4 MG/1
4 TABLET, ORALLY DISINTEGRATING ORAL EVERY 8 HOURS PRN
Status: DISCONTINUED | OUTPATIENT
Start: 2025-06-21 | End: 2025-06-26

## 2025-06-21 RX ORDER — NITROGLYCERIN 0.4 MG/1
0.4 TABLET SUBLINGUAL EVERY 5 MIN PRN
Status: CANCELLED | OUTPATIENT
Start: 2025-06-21

## 2025-06-21 RX ORDER — ALLOPURINOL 300 MG/1
300 TABLET ORAL DAILY
Status: DISCONTINUED | OUTPATIENT
Start: 2025-06-22 | End: 2025-06-23 | Stop reason: SDUPTHER

## 2025-06-21 RX ORDER — ACETAMINOPHEN 325 MG/1
650 TABLET ORAL EVERY 6 HOURS PRN
Status: DISCONTINUED | OUTPATIENT
Start: 2025-06-21 | End: 2025-06-26

## 2025-06-21 RX ORDER — VALACYCLOVIR HYDROCHLORIDE 500 MG/1
500 TABLET, FILM COATED ORAL DAILY
Status: CANCELLED | OUTPATIENT
Start: 2025-06-22

## 2025-06-21 RX ORDER — POLYETHYLENE GLYCOL 3350 17 G/17G
17 POWDER, FOR SOLUTION ORAL DAILY PRN
Status: CANCELLED | OUTPATIENT
Start: 2025-06-21

## 2025-06-21 RX ORDER — LOSARTAN POTASSIUM AND HYDROCHLOROTHIAZIDE 25; 100 MG/1; MG/1
1 TABLET ORAL DAILY
Status: CANCELLED | OUTPATIENT
Start: 2025-06-22

## 2025-06-21 RX ORDER — ALBUTEROL SULFATE 0.83 MG/ML
2.5 SOLUTION RESPIRATORY (INHALATION) EVERY 6 HOURS PRN
Status: DISCONTINUED | OUTPATIENT
Start: 2025-06-21 | End: 2025-06-26

## 2025-06-21 RX ORDER — PANTOPRAZOLE SODIUM 40 MG/1
40 TABLET, DELAYED RELEASE ORAL DAILY
Status: CANCELLED | OUTPATIENT
Start: 2025-06-22

## 2025-06-21 RX ORDER — ACETAMINOPHEN 650 MG/1
650 SUPPOSITORY RECTAL EVERY 6 HOURS PRN
Status: DISCONTINUED | OUTPATIENT
Start: 2025-06-21 | End: 2025-06-26

## 2025-06-21 RX ADMIN — ALLOPURINOL 300 MG: 300 TABLET ORAL at 08:33

## 2025-06-21 RX ADMIN — HEPARIN SODIUM AND DEXTROSE 13 UNITS/KG/HR: 10000; 5 INJECTION INTRAVENOUS at 17:21

## 2025-06-21 RX ADMIN — HEPARIN SODIUM AND DEXTROSE 13 UNITS/KG/HR: 10000; 5 INJECTION INTRAVENOUS at 02:48

## 2025-06-21 RX ADMIN — ATORVASTATIN CALCIUM 40 MG: 40 TABLET, FILM COATED ORAL at 23:07

## 2025-06-21 RX ADMIN — CARVEDILOL 6.25 MG: 3.12 TABLET, FILM COATED ORAL at 08:34

## 2025-06-21 RX ADMIN — SPIRONOLACTONE 25 MG: 25 TABLET ORAL at 08:34

## 2025-06-21 RX ADMIN — PANTOPRAZOLE SODIUM 40 MG: 40 TABLET, DELAYED RELEASE ORAL at 08:32

## 2025-06-21 RX ADMIN — ASPIRIN 81 MG: 81 TABLET, DELAYED RELEASE ORAL at 08:34

## 2025-06-21 RX ADMIN — CARVEDILOL 6.25 MG: 3.12 TABLET, FILM COATED ORAL at 16:35

## 2025-06-21 RX ADMIN — LOSARTAN POTASSIUM AND HYDROCHLOROTHIAZIDE 1 TABLET: 25; 100 TABLET ORAL at 08:31

## 2025-06-21 RX ADMIN — VALACYCLOVIR HYDROCHLORIDE 500 MG: 500 TABLET, FILM COATED ORAL at 08:34

## 2025-06-21 RX ADMIN — SODIUM CHLORIDE, PRESERVATIVE FREE 10 ML: 5 INJECTION INTRAVENOUS at 08:35

## 2025-06-21 RX ADMIN — HEPARIN SODIUM AND DEXTROSE 13 UNITS/KG/HR: 10000; 5 INJECTION INTRAVENOUS at 18:39

## 2025-06-21 ASSESSMENT — ENCOUNTER SYMPTOMS
SINUS PRESSURE: 0
SHORTNESS OF BREATH: 0
APNEA: 0
CONSTIPATION: 0
ABDOMINAL PAIN: 0
COLOR CHANGE: 0
NAUSEA: 0
RHINORRHEA: 0
EYES NEGATIVE: 1

## 2025-06-21 ASSESSMENT — PAIN SCALES - GENERAL
PAINLEVEL_OUTOF10: 0
PAINLEVEL_OUTOF10: 0

## 2025-06-21 ASSESSMENT — PAIN SCALES - WONG BAKER: WONGBAKER_NUMERICALRESPONSE: NO HURT

## 2025-06-21 NOTE — PROGRESS NOTES
Hospitalist Progress Note    NAME:   Miko Toney   : 1975   MRN: [id number]     Date/Time: 2025 7:46 AM  Patient PCP: Bridger Joshi MD    Estimated discharge date: 48 hours  Barriers: Cardiology    Hospital course:  Miko Toney is a 49 y.o.  male with PMHx significant for hypertension, GERD, prediabetes, gout, and ADHD presented to the ER with acute chest pain with tingling in the arms, diaphoresis, and dyspnea onset at rest.  Reports brother had heart attack at 44 years old.  Reports chest pain came on while sitting in a chair around 5 PM today, went away for about an hour and 45 minutes and came back on without relief and decided to come to the ER.  Chest pain was 4 out of 10.  Denies alcohol, tobacco and drug use.  Brother and fiancé accompany him in the ER.      Reports chest pain is gone now with nitroglycerin given in ER.     Chest x-ray negative for acute cardiopulmonary process.  CBC shows leukocytosis at 20.0 WBC. Troponin originally normal at 16, repeat elevated at 98. D-dimer normal. EKG shows no acute ST changes consistent with ischemia.      Admitting due to NSTEMI and leukocytosis.       Assessment / Plan:  NSTEMI  Hypertension    Serial troponins with elevation started at 16 now at 859>>655  Echo showed EF to 55 to 60% normal left ventricular systolic function  S/p [facility] [place]    Final impression: Multivessel critical coronary stenosis with preserved left ventricular systolic contractility. Plan will consider cardiac surgical consultation and possible bypass surgery.  Will defer transfer for poss CABG to Cardiology   Continue heparin drip   nitroglycerin sublingual as needed for chest pain  aspirin 81 mg p.o. tablet, lipitor 40 mg po tablet qd Coreg losartan, hydrochlorothiazide, spironolactone,  Check lipid panel and A1c pending     Leukocytosis  WBC 20 on admission now at 15.4  No fever on admission  Urine suggestive of infection  follow culture   Ucx negative  will cont  bedside complaining of inability to rest overnight due to multiple staff activities.  Discussed with primary nurse to cluster patient care.  Discussed with patient awaiting discussion with cardiology if possible transfer is needed .  Discussed with RN events overnight.     Objective:     VITALS:   Last 24hrs VS reviewed since prior progress note. Most recent are:  Patient Vitals for the past 24 hrs:   BP Temp Temp src Pulse Resp SpO2 Weight   06/21/25 0327 110/72 98.2 °F (36.8 °C) Oral 68 19 93 % 135.4 kg (298 lb 8.1 oz)   06/21/25 0004 -- -- -- 68 -- -- --   06/21/25 0002 120/81 98.1 °F (36.7 °C) Oral 70 18 94 % --   06/20/25 2032 108/74 97.5 °F (36.4 °C) Oral 77 18 93 % --   06/20/25 1937 116/77 97.9 °F (36.6 °C) -- 72 16 96 % --   06/20/25 1703 -- -- -- -- 16 -- --   06/20/25 1633 -- -- -- -- 18 -- --   06/20/25 1604 119/88 97.9 °F (36.6 °C) Oral 80 18 92 % --   06/20/25 1513 -- -- -- 71 -- -- --   06/20/25 1510 119/73 97.6 °F (36.4 °C) Oral 74 21 98 % --   06/20/25 1500 113/83 -- -- 77 18 97 % --   06/20/25 1445 128/84 -- -- 73 16 97 % --   06/20/25 1437 (!) 132/93 97.6 °F (36.4 °C) Oral 78 17 98 % --   06/20/25 1129 (!) 141/85 98.2 °F (36.8 °C) Oral 78 18 -- --   06/20/25 0757 (!) 145/89 98.2 °F (36.8 °C) Oral 80 18 -- --         Intake/Output Summary (Last 24 hours) at 6/21/2025 0746  Last data filed at 6/20/2025 1703  Gross per 24 hour   Intake --   Output 1520 ml   Net -1520 ml        I had a face to face encounter and independently examined this patient on 6/21/2025, as outlined below:    Review of Systems   Constitutional:  Negative for activity change, appetite change, diaphoresis and fever.   HENT:  Negative for rhinorrhea and sinus pressure.    Eyes: Negative.    Respiratory:  Negative for apnea and shortness of breath.    Cardiovascular:  Negative for chest pain and palpitations.   Gastrointestinal:  Negative for abdominal pain, constipation and nausea.   Genitourinary:  Negative for difficulty

## 2025-06-21 NOTE — PROGRESS NOTES
Report called to Kenyatta Crowe RN at [facility].  All questions answered.  Report given to [facility] and care transitioned to EMS provider.  All questions answered.  Patient home medications given to his fiance per patient request.

## 2025-06-21 NOTE — DISCHARGE SUMMARY
Discharge Summary    Name: Miko Toney  [id number]  YOB: 1975 (Age: 49 y.o.)   Date of Admission: 6/19/2025  Date of Discharge: 6/21/2025  Attending Physician: Alex Garnett MD    Discharge Diagnosis:   Principal Problem:    Chest pain  Active Problems:    ADHD (attention deficit hyperactivity disorder)    Coronary artery disease without angina pectoris    Elevated troponin I level    Primary hypertension    Pre-diabetes    NSTEMI (non-ST elevated myocardial infarction) ([facility])  Resolved Problems:    * No resolved hospital problems. *       Consultations:  IP CONSULT TO CARDIOLOGY  IP CONSULT TO CARDIOLOGY  IP CONSULT TO CARDIOLOGY      Brief Admission History/Reason for Admission Per Reena Scott MD:   Miko Toney is a 49 y.o.  male with PMHx significant for hypertension, GERD, prediabetes, gout, and ADHD presented to the ER with acute chest pain with tingling in the arms, diaphoresis, and dyspnea onset at rest.  Reports brother had heart attack at 44 years old.  Reports chest pain came on while sitting in a chair around 5 PM today, went away for about an hour and 45 minutes and came back on without relief and decided to come to the ER.  Chest pain was 4 out of 10.  Denies alcohol, tobacco and drug use.  Brother and fiancé accompany him in the ER.      Reports chest pain is gone now with nitroglycerin given in ER.     Chest x-ray negative for acute cardiopulmonary process.  CBC shows leukocytosis at 20.0 WBC. Troponin originally normal at 16, repeat elevated at 98. D-dimer normal. EKG shows no acute ST changes consistent with ischemia.      Admitting due to NSTEMI and leukocytosis.    Brief Hospital Course by Main Problems:   NSTEMI  Hypertension     Serial troponins with elevation started at 16 now at 859>>655  Echo showed EF to 55 to 60% normal left ventricular systolic function  S/p [facility] 6/20   Final impression: Multivessel critical coronary stenosis

## 2025-06-21 NOTE — H&P
Hospitalist Admission Note     Demographics    Patient Name  Miko Toney   Date of Birth 1975   Medical Record Number  [id number]    Age  49 y.o.   PCP Bridger Joshi MD   Admit date:  6/21/2025  6:42 PM   Date of Service 6/21/25   Room Number  2213/01    @ [facility]     Admission Diagnosis:  CAD, multiple vessel      Active Problem List:  Active Hospital Problems    Diagnosis Date Noted    CAD, multiple vessel 06/21/2025     Priority: High    NSTEMI (non-ST elevated myocardial infarction) (HCC) 06/21/2025     Priority: High    ACS (acute coronary syndrome) ([facility]) 06/21/2025     Priority: High        Integrated HPI // Assessment & Plan:   [facility] Transfer --> see below for details regarding conversations regarding history of present illness, pertinent data, etc from discussion for acceptance with OSH ED provider (and  transfer center.)    Initial Perfect Serve message  [facility] transfer (accepted @ 3:15 PM EDT on 6/21/25)  DX: CAD multiple vessel  Requested LOC: intermediate  Referring Provider & Current Location: Dr.Abigail Hoffman, [facility]  Reason for Transfer: Service not Support    In discussion with transferring provider --> Presented with CP, found to have NSTEMI. Had cardiac cath yesterday. Multivessel CAD. Needs CABG eval, Dr. Byers is aware, requested Hospitalist admission. Had WBC of 20, on CTX for presumed UTI initially but urine culture negative. Finishing 5 day course of abx given WBC remains elevated.    In my discussion with patient/spouse on arrival: No acute concerns, no chest pain since last night. He is in good spirits given context, joking at times during exam. Interestingly, he did reveal that his brother and father both have EXTENSIVE cardiac history including CABG and transplant in father, CABG and post CABG MI in brother.       Answered all questions and concerns to the best my ability with given  Occurrences    CBC     Frequency: Daily     Number of Occurrences: 3 Days    CBC     Frequency: Every other day     Number of Occurrences: 10 Days    CBC with Auto Differential     Frequency: Tomorrow AM     Number of Occurrences: 1 Occurrences    Comprehensive Metabolic Panel     Frequency: Daily     Number of Occurrences: 3 Days    Magnesium     Frequency: Daily     Number of Occurrences: 3 Days    Magnesium     Frequency: Daily     Number of Occurrences: 3 Days    Phosphorus     Frequency: Tomorrow AM     Number of Occurrences: 1 Occurrences    Thyroid Cascade Profile     Frequency: Tomorrow AM     Number of Occurrences: 1 Occurrences            Certification: I am admitting Miko NAPIER Toney 49 y.o. male with a principle diagnosis of CAD, multiple vessel. This patient also suffers from other comorbidities listed above. I have a high level of concern for the active problems (see above) leading to life threatening complications or multi system organ failure if left untreated. Patient admitted under Inpatient status.   MDM:I have independently interviewed and examined the patient. I have reviewed the pertinent notes (including outside documentation,) laboratory tests, vitals, EKG (if applicable,) radiographic data, and all relevant data in order to formulate the plan of care (within the constraints of this admission process. these and other pertinent data were taken into consideration when the treatment plan was developed and customized to this patient's unique overall circumstances and needsI have reviewed at least 3 distinct labs ordered by myself or another provider. I have ordered the appropriate AM labs, which can be found throughout the EMR orders. I've created/updated an active problem list and updated the PMH (see above.) I've reconciled meds against available data and reporting. I've ordered follow-up labs if appropriate. I provided initial recommendations prior to transfer, spoke directly with ED provider.

## 2025-06-21 NOTE — PLAN OF CARE
Problem: Discharge Planning  Goal: Discharge to home or other facility with appropriate resources  6/21/2025 1042 by Lorelei Mcintosh RN  Outcome: Progressing  6/20/2025 2055 by Jennifer Quinonez RN  Outcome: Progressing  Flowsheets (Taken 6/20/2025 2022)  Discharge to home or other facility with appropriate resources: Identify barriers to discharge with patient and caregiver     Problem: Pain  Goal: Verbalizes/displays adequate comfort level or baseline comfort level  6/21/2025 1042 by Lorelei Mcintosh RN  Outcome: Progressing  6/20/2025 2055 by Jennifer Quinonez RN  Outcome: Progressing     Problem: Cardiovascular - Adult  Goal: Maintains optimal cardiac output and hemodynamic stability  6/21/2025 1042 by Lorelei Mcintosh RN  Outcome: Progressing  6/20/2025 2055 by Jennifer Quinonez RN  Outcome: Progressing  Flowsheets (Taken 6/20/2025 2022)  Maintains optimal cardiac output and hemodynamic stability: Monitor blood pressure and heart rate  Goal: Absence of cardiac dysrhythmias or at baseline  Outcome: Progressing

## 2025-06-21 NOTE — PROGRESS NOTES
Progress Note      6/21/2025 1:02 PM  NAME: Miko Toney   MRN:[id number]   Admit Diagnosis: Chest pain [R07.9]  Elevated troponin [R79.89]  Chest pain, unspecified type [R07.9]      Subjective:   06/21/2025 chart reviewed.  Cardiac catheterization findings explained.  Patient has a multivessel coronary artery disease and a decision is made to consider a bypass surgery.  I had extensive discussion with the patient and with his wife.    Review of Systems:    Symptom Y/N Comments  Symptom Y/N Comments   Fever/Chills n   Chest Pain n    Poor Appetite    Edema     Cough    Abdominal Pain n    Sputum    Joint Pain     SOB/BELTRAN n   Pruritis/Rash     Nausea/vomit    Other     Diarrhea         Constipation           Could NOT obtain due to:      Objective:          Physical Exam:    Last 24hrs VS reviewed since prior progress note. Most recent are:    /86   Pulse 67   Temp 97.7 °F (36.5 °C) (Oral)   Resp 18   Ht 1.829 m (6')   Wt 135.4 kg (298 lb 8.1 oz)   SpO2 95%   BMI 40.48 kg/m²     Intake/Output Summary (Last 24 hours) at 6/21/2025 1302  Last data filed at 6/20/2025 1703  Gross per 24 hour   Intake --   Output 1520 ml   Net -1520 ml        General Appearance: Well developed, well nourished, alert & oriented x 3,    no acute distress.  Ears/Nose/Mouth/Throat: Hearing grossly normal.  Neck: Supple.  Chest: Lungs clear to auscultation bilaterally.  Cardiovascular: Regular rate and rhythm, S1,S2 normal, no murmur.  Abdomen: Soft, non-tender, bowel sounds are active.  Extremities: No edema bilaterally.  Skin: Warm and dry.    []         Post-cath site without hematoma, bruit, tenderness, or thrill.  Distal pulses intact.    PMH/SH reviewed - no change compared to H&P    Data Review    Telemetry: normal sinus rhythm     EKG:   []  No new EKG for review    Lab Data Personally Reviewed:    Recent Labs     06/20/25  0725 06/21/25  0418   WBC 14.7* 15.4*   HGB 14.2 13.9   HCT 42.8 41.3    250      Recent Labs     06/20/25  0033   INR 1.1   PROTIME 14.1   APTT 25.6      Recent Labs     06/19/25 1859 06/19/25 2300 06/21/25  0418    139 139   K 3.7 4.0 3.6    104 108   CO2 24 26 24   BUN 14 12 13   CREATININE 0.98 0.90 0.90   GLUCOSE 111* 110* 134*   CALCIUM 9.3 9.0 8.6   MG  --   --  1.8     No results for input(s): \"CKTOTAL\", \"CKMB\", \"TROPONINI\", \"BNP\", \"PROBNP\", \"NTPROBNP\" in the last 72 hours.    Invalid input(s): \"CKINDEX\"  No results found for: \"CHOL\", \"TRIG\", \"HDL\", \"VLDL\", \"CHOLHDLRATIO\"    Recent Labs     06/19/25 1859 06/19/25 2300 06/21/25  0418   AST 39* 39* 43*   ALKPHOS 64 53 55   GLOB 3.7 3.2 3.2     No results for input(s): \"PH\", \"PCO2\", \"PO2\" in the last 72 hours.    Medications Personally Reviewed:    Current Facility-Administered Medications   Medication Dose Route Frequency    losartan-hydroCHLOROthiazide (HYZAAR) 100-25 MG per tablet 1 tablet (Patient Supplied)  1 tablet Oral Daily    nitroGLYCERIN (NITROSTAT) SL tablet 0.4 mg  0.4 mg SubLINGual Q5 Min PRN    aspirin EC tablet 81 mg  81 mg Oral Daily    calcium carbonate (TUMS) chewable tablet 1,000 mg  1,000 mg Oral TID PRN    morphine (PF) injection 2 mg  2 mg IntraVENous Q4H PRN    0.9 % sodium chloride infusion   IntraVENous Continuous    sodium chloride flush 0.9 % injection 5-40 mL  5-40 mL IntraVENous 2 times per day    sodium chloride flush 0.9 % injection 5-40 mL  5-40 mL IntraVENous PRN    0.9 % sodium chloride infusion   IntraVENous PRN    potassium chloride (KLOR-CON M) extended release tablet 40 mEq  40 mEq Oral PRN    Or    potassium bicarb-citric acid (EFFER-K) effervescent tablet 40 mEq  40 mEq Oral PRN    Or    potassium chloride 10 mEq/100 mL IVPB (Peripheral Line)  10 mEq IntraVENous PRN    magnesium sulfate 2000 mg in 50 mL IVPB premix  2,000 mg IntraVENous PRN    ondansetron (ZOFRAN-ODT) disintegrating tablet 4 mg  4 mg Oral Q8H PRN    Or    ondansetron (ZOFRAN) injection 4 mg  4 mg IntraVENous Q6H

## 2025-06-21 NOTE — PROGRESS NOTES
Patient requested Left AC IV catheter removal.  Cath removed with tip intact.  New IV placed Right hand, 20g.

## 2025-06-21 NOTE — PROGRESS NOTES
Patient c/o chest pain/ tightness 2/10.  EKG completed at bedside, VSS.  Dr. Scott notified of patient complaints.  Dr. Scott deferred to cardiologist for management.

## 2025-06-21 NOTE — PROGRESS NOTES
Pt for transfer to [facility] for CTS  evaluation for possible CABG   Dr Byers accepting physician   Jad initiate transfer     1500 Spoke with Dr SU Shah accepting Hospitalist . Will transfer when bed available .

## 2025-06-21 NOTE — PLAN OF CARE
Problem: Discharge Planning  Goal: Discharge to home or other facility with appropriate resources  6/20/2025 2055 by Jennifer Quinonez RN  Outcome: Progressing  6/20/2025 1119 by Lorelei Mcintosh RN  Outcome: Progressing     Problem: Discharge Planning  Goal: Discharge to home or other facility with appropriate resources  6/20/2025 2055 by Jennifer Quinonez RN  Outcome: Progressing  6/20/2025 1119 by Lorelei Mcintosh RN  Outcome: Progressing     Problem: Pain  Goal: Verbalizes/displays adequate comfort level or baseline comfort level  6/20/2025 2055 by Jennifer Quinonez RN  Outcome: Progressing  6/20/2025 1119 by Lorelei Mcintosh RN  Outcome: Progressing     Problem: Cardiovascular - Adult  Goal: Maintains optimal cardiac output and hemodynamic stability  Outcome: Progressing

## 2025-06-22 LAB
ANION GAP SERPL CALC-SCNC: 4 MMOL/L (ref 2–12)
APPEARANCE UR: CLEAR
ARTERIAL PATENCY WRIST A: YES
BACTERIA SPEC CULT: NORMAL
BACTERIA SPEC CULT: NORMAL
BACTERIA URNS QL MICRO: NEGATIVE /HPF
BASE DEFICIT BLDA-SCNC: 2 MMOL/L
BASOPHILS # BLD: 0.19 K/UL (ref 0–0.1)
BASOPHILS NFR BLD: 1.2 % (ref 0–1)
BDY SITE: NORMAL
BILIRUB UR QL: NEGATIVE
BUN SERPL-MCNC: 13 MG/DL (ref 6–20)
BUN/CREAT SERPL: 13 (ref 12–20)
CALCIUM SERPL-MCNC: 9 MG/DL (ref 8.5–10.1)
CHLORIDE SERPL-SCNC: 105 MMOL/L (ref 97–108)
CO2 SERPL-SCNC: 27 MMOL/L (ref 21–32)
COLOR UR: NORMAL
CREAT SERPL-MCNC: 0.97 MG/DL (ref 0.7–1.3)
DIFFERENTIAL METHOD BLD: ABNORMAL
EOSINOPHIL # BLD: 0.71 K/UL (ref 0–0.4)
EOSINOPHIL NFR BLD: 4.7 % (ref 0–7)
EPITH CASTS URNS QL MICRO: NORMAL /LPF
ERYTHROCYTE [DISTWIDTH] IN BLOOD BY AUTOMATED COUNT: 13.2 % (ref 11.5–14.5)
GLUCOSE SERPL-MCNC: 156 MG/DL (ref 65–100)
GLUCOSE UR STRIP.AUTO-MCNC: NEGATIVE MG/DL
HCO3 BLDA-SCNC: 22 MMOL/L (ref 22–26)
HCT VFR BLD AUTO: 41.9 % (ref 36.6–50.3)
HGB BLD-MCNC: 14.2 G/DL (ref 12.1–17)
HGB UR QL STRIP: NEGATIVE
HYALINE CASTS URNS QL MICRO: NORMAL /LPF (ref 0–2)
IMM GRANULOCYTES # BLD AUTO: 0.25 K/UL (ref 0–0.04)
IMM GRANULOCYTES NFR BLD AUTO: 1.6 % (ref 0–0.5)
INR PPP: 1 (ref 0.9–1.1)
KETONES UR QL STRIP.AUTO: NEGATIVE MG/DL
LEUKOCYTE ESTERASE UR QL STRIP.AUTO: NEGATIVE
LYMPHOCYTES # BLD: 3.86 K/UL (ref 0.8–3.5)
LYMPHOCYTES NFR BLD: 25.3 % (ref 12–49)
Lab: NORMAL
Lab: NORMAL
MAGNESIUM SERPL-MCNC: 2 MG/DL (ref 1.6–2.4)
MCH RBC QN AUTO: 29.9 PG (ref 26–34)
MCHC RBC AUTO-ENTMCNC: 33.9 G/DL (ref 30–36.5)
MCV RBC AUTO: 88.2 FL (ref 80–99)
MONOCYTES # BLD: 1.17 K/UL (ref 0–1)
MONOCYTES NFR BLD: 7.7 % (ref 5–13)
NEUTS SEG # BLD: 9.07 K/UL (ref 1.8–8)
NEUTS SEG NFR BLD: 59.5 % (ref 32–75)
NITRITE UR QL STRIP.AUTO: NEGATIVE
NRBC # BLD: 0 K/UL (ref 0–0.01)
NRBC BLD-RTO: 0 PER 100 WBC
NT PRO BNP: 21 PG/ML
P2Y12 PLT RESPONSE: 201 PRU (ref 194–418)
PCO2 BLDA: 37 MMHG (ref 35–45)
PH BLDA: 7.4 (ref 7.35–7.45)
PH UR STRIP: 7 (ref 5–8)
PHOSPHATE SERPL-MCNC: 2.9 MG/DL (ref 2.6–4.7)
PLATELET # BLD AUTO: 253 K/UL (ref 150–400)
PMV BLD AUTO: 11 FL (ref 8.9–12.9)
PO2 BLDA: 86 MMHG (ref 80–100)
POTASSIUM SERPL-SCNC: 3.3 MMOL/L (ref 3.5–5.1)
PROT UR STRIP-MCNC: NEGATIVE MG/DL
PROTHROMBIN TIME: 10.7 SEC (ref 9.2–11.2)
RBC # BLD AUTO: 4.75 M/UL (ref 4.1–5.7)
RBC #/AREA URNS HPF: NORMAL /HPF (ref 0–5)
SAO2 % BLD: 97 % (ref 92–97)
SAO2% DEVICE SAO2% SENSOR NAME: NORMAL
SODIUM SERPL-SCNC: 136 MMOL/L (ref 136–145)
SP GR UR REFRACTOMETRY: 1.01
SPECIMEN SITE: NORMAL
TSH SERPL DL<=0.05 MIU/L-ACNC: 2.55 UIU/ML (ref 0.36–3.74)
UFH PPP CHRO-ACNC: 0.38 IU/ML
URINE CULTURE IF INDICATED: NORMAL
UROBILINOGEN UR QL STRIP.AUTO: 0.2 EU/DL (ref 0.2–1)
WBC # BLD AUTO: 15.3 K/UL (ref 4.1–11.1)
WBC URNS QL MICRO: NORMAL /HPF (ref 0–4)

## 2025-06-22 PROCEDURE — 82803 BLOOD GASES ANY COMBINATION: CPT

## 2025-06-22 PROCEDURE — 99222 1ST HOSP IP/OBS MODERATE 55: CPT

## 2025-06-22 PROCEDURE — 80048 BASIC METABOLIC PNL TOTAL CA: CPT

## 2025-06-22 PROCEDURE — 85025 COMPLETE CBC W/AUTO DIFF WBC: CPT

## 2025-06-22 PROCEDURE — 84100 ASSAY OF PHOSPHORUS: CPT

## 2025-06-22 PROCEDURE — 84443 ASSAY THYROID STIM HORMONE: CPT

## 2025-06-22 PROCEDURE — 6360000002 HC RX W HCPCS: Performed by: STUDENT IN AN ORGANIZED HEALTH CARE EDUCATION/TRAINING PROGRAM

## 2025-06-22 PROCEDURE — 36415 COLL VENOUS BLD VENIPUNCTURE: CPT

## 2025-06-22 PROCEDURE — 2060000000 HC ICU INTERMEDIATE R&B

## 2025-06-22 PROCEDURE — 83880 ASSAY OF NATRIURETIC PEPTIDE: CPT

## 2025-06-22 PROCEDURE — 36600 WITHDRAWAL OF ARTERIAL BLOOD: CPT

## 2025-06-22 PROCEDURE — 85576 BLOOD PLATELET AGGREGATION: CPT

## 2025-06-22 PROCEDURE — 81001 URINALYSIS AUTO W/SCOPE: CPT

## 2025-06-22 PROCEDURE — 2500000003 HC RX 250 WO HCPCS: Performed by: INTERNAL MEDICINE

## 2025-06-22 PROCEDURE — 6360000002 HC RX W HCPCS: Performed by: INTERNAL MEDICINE

## 2025-06-22 PROCEDURE — 85610 PROTHROMBIN TIME: CPT

## 2025-06-22 PROCEDURE — 83735 ASSAY OF MAGNESIUM: CPT

## 2025-06-22 PROCEDURE — 6370000000 HC RX 637 (ALT 250 FOR IP): Performed by: STUDENT IN AN ORGANIZED HEALTH CARE EDUCATION/TRAINING PROGRAM

## 2025-06-22 PROCEDURE — 85520 HEPARIN ASSAY: CPT

## 2025-06-22 RX ADMIN — ATORVASTATIN CALCIUM 40 MG: 40 TABLET, FILM COATED ORAL at 20:28

## 2025-06-22 RX ADMIN — HEPARIN SODIUM AND DEXTROSE 13 UNITS/KG/HR: 10000; 5 INJECTION INTRAVENOUS at 10:34

## 2025-06-22 RX ADMIN — WATER 1000 MG: 1 INJECTION INTRAMUSCULAR; INTRAVENOUS; SUBCUTANEOUS at 11:58

## 2025-06-22 RX ADMIN — SENNOSIDES AND DOCUSATE SODIUM 2 TABLET: 50; 8.6 TABLET ORAL at 19:32

## 2025-06-22 RX ADMIN — HYDROCHLOROTHIAZIDE 25 MG: 25 TABLET ORAL at 10:11

## 2025-06-22 RX ADMIN — CARVEDILOL 6.25 MG: 6.25 TABLET, FILM COATED ORAL at 17:23

## 2025-06-22 RX ADMIN — POTASSIUM CHLORIDE 40 MEQ: 1500 TABLET, EXTENDED RELEASE ORAL at 10:12

## 2025-06-22 RX ADMIN — HEPARIN SODIUM AND DEXTROSE 13 UNITS/KG/HR: 10000; 5 INJECTION INTRAVENOUS at 23:12

## 2025-06-22 RX ADMIN — CARVEDILOL 6.25 MG: 6.25 TABLET, FILM COATED ORAL at 10:11

## 2025-06-22 RX ADMIN — ASPIRIN 81 MG: 81 TABLET, DELAYED RELEASE ORAL at 10:11

## 2025-06-22 ASSESSMENT — PAIN SCALES - GENERAL
PAINLEVEL_OUTOF10: 0
PAINLEVEL_OUTOF10: 0

## 2025-06-22 NOTE — CONSULTS
Cardiology Consultation Note / Admission History & Physical      Patient: Miko Toney Age: 49 y.o. Sex: male    YOB: 1975 Admit Date: 6/21/2025 PCP: Bridger Joshi MD   MRN: [id number]  CSN: [id number]       PCP: Bridger Joshi MD  Cardiologist: J LUIS CATES MD     Recommendations:    I personally reviewed patient's coronary angiography done by my excellent colleague Dr. Villafana.  I discussed disease process prognosis and treatment options patient detail.  Strongly recommend patient underwent surgical revascularization with hopefully with all arterial conduits.  (Please consider LIMA-LAD, arterial conduit-ramus/OM, arterial conduit-RCA/PDA).  Patient is a very distal LAD has disease as well.  Patient will continue be on heparin drip till surgery.  I personally discussed with my excellent colleague Dr. Matt from cardiothoracic surgery department.  I also reviewed his echocardiogram images.  Preserved lateral ejection fraction.  No major valve issues.  Some redundant chordae and mitral but no significant mitral regurgitation.  The patient had a history of Guillain-Barré, no issues with anesthesia in the past.  Nutrition consult as well for diet counseling  Impressions:    NSTEMI  Very strong family history of atherosclerotic heart disease with father requiring CABG followed by transplant brother requiring CABG in his 40s  Hypertension  Prediabetes      Problems:    Patient Active Problem List    Diagnosis Date Noted    ADHD (attention deficit hyperactivity disorder) 06/21/2025    Coronary artery disease without angina pectoris 06/21/2025    Elevated troponin I level 06/21/2025    Primary hypertension 06/21/2025    Pre-diabetes 06/21/2025    NSTEMI (non-ST elevated myocardial infarction) (HCC) 06/21/2025    CAD, multiple vessel 06/21/2025    ACS (acute coronary syndrome) ([facility]) 06/21/2025    Chest pain 06/19/2025       HPI:    49-year-old gentleman with history of hypertension,

## 2025-06-22 NOTE — CONSULTS
CSS   History and Physical    Subjective:      Miko Toney is a 49 y.o. male with a past medical history of HTN, HLD, prediabetes, JAMES, asthma, GERD, gout, arthritis, HSV, Guillain-[place] (2015 after bacterial infection) who was referred for cardiac evaluation by Dr. Stewart for CAD.     Patient presented to [facility] emergency department June 19 with chest pain that started when he was sitting in a chair.  Pain was midsternal pressure-like \"felt like someone was pushing\" with radiation to the left shoulder and left arm with tingling down the left arm and associated with significant diaphoresis.  Pain lasted for approximately 1 hour before they called EMS.    Troponins were 16/98/393/859/655.  EKG NSR.  WBC elevated on arrival to 20.  CMP unremarkable.  No acute process on chest x-ray.  He was admitted to medical telemetry.  Cardiology was consulted for NSTEMI and further workup.  He was started on heparin drip for NSTEMI and IV Rocephin for his leukocytosis while cultures were pending.  He was started on Brilinta and received cardiac cath on June 20, with results as below and for which patient was transferred/cardiac surgery was consulted.    In addition to above endorses SOB.  States that during the above episode every once in a while it felt like he had to take a deep breath, similar to when he had prior asthma attacks years ago.  Has noticed decreased stamina and increased fatigability over the past year and a half.  Denies LE edema, syncope, dizziness, palpitations, and claudication.  He denies any symptoms at present.    Denies history of stroke or mini stroke, thyroid disease, hormone replacement therapy, prior radiation/trauma/surgery to the chest wall, liver disease, kidney disease,  issues, dysphagia, blood/blackness/tarriness of stool, PAD/PVD, history of blood clots or bleeding, cancer or immune disease, wounds or sores anywhere.     Denies drugs, alcohol, tobacco.  Significant family  bleeding     No=0   Labile  INR  Unstable/high INRs, time in therapeutic range <60%   No=0   Age >65   No=0   Medication usage predisposing to bleeding   Aspirin, clopidogrel, NSAIDs   Yes=1   Alcohol use   >7 drinks/week   No=0     HAS-BLED Score:    2:  Risk was 4.1% in one validation study and 1.88 bleeds per 100 patient-years in another validation study.     Has-BLED score: 1 low risk, 2 moderate risk, 3 or greater high risk for bleeding and anticoagulation should not be given      Assessment:     Principal Problem:    CAD, multiple vessel  Active Problems:    NSTEMI (non-ST elevated myocardial infarction) ([facility])    ACS (acute coronary syndrome) ([facility])  Resolved Problems:    * No resolved hospital problems. *        Plan:     STS Risk Calculator V2.81 - Discussed by surgeon with patient.   Pending completion of workup.     Treatment Plan:    NSTEMI/MVCAD: Initiate cardiac surgery workup.  Received 180 mg of Brilinta on 6/20/25.  Final plan TBD Monday.  Continue aspirin, statin, beta-blocker, heparin drip.  Would utilize nitro for recurrence of chest pain.  ERAS binder to be provided to pt.   Start Ensure High Protein TBD.   Start preop amiodarone load on TBD.   Start mupirocin and peridex AM of TBD.   No solids after midnight on the night before surgery. Clears allowed until 3 hours prior to surgery.   Carb load (Gatorade) the night before surgery on TBD at 10pm and the morning of surgery at 0400. Must finish within 45 min. NPO thereafter.   Please give pt ordered 1g tylenol and 300mg gabapentin morning of surgery (preop holding)  Leukocytosis due to possible UTI?: WBC 15.3.  Blood cultures and urine cultures clear.  On ceftriaxone per primary team.  HTN: On spironolactone, Hyzaar PTA. Would hold losartan 48 hours before pump time- placed on holding pending surgical eval.   HLD: Continue statin.   JAMES, on CPAP: Continue.   Asthma: does not see pulmonologist PTA. No meds PTA. Check pre-op PFTs.   GERD: On Protonix

## 2025-06-22 NOTE — PROGRESS NOTES
End of Shift Note    Bedside shift change report given to Noris LOPEZ (oncoming nurse) by Kenyatta Crowe RN (offgoing nurse).  Report included the following information SBAR, Kardex, ED Summary, MAR, Recent Results, and Cardiac Rhythm NSR    Shift worked:  0532-6057     Shift summary and any significant changes:    Patient transferred to [facility] from [facility].     Heparin gtt currently infusing t 13 units/kg/hr.   Xa therapeutic; next Xa to be drawn with morning labs      Concerns for physician to address:  None      Zone phone for oncoming shift:              Kenyatta Crowe RN

## 2025-06-22 NOTE — PROGRESS NOTES
Hospitalist Progress Note    NAME:   Miko Toney   : 1975   MRN: [id number]     Date/Time: 2025 2:14 PM  Patient PCP: Bridger Joshi MD    Estimated discharge date:   Barriers: Cardiac surgery plan for possible bypass      Assessment / Plan:    AC  NSTEMI  Multivessel CAD requiring CABG evaluation  - admitted to IVCU from [facility] as transfer  - cariology consulted prior to transfer, formal consult placed on arrival               --> will defer to cardiology for coordination of CABG evaluation  - anticoagulation:  heparin infusion  - beta blocker: continue coreg 6.25 mg BID  - DAPT: ASA 81 mg daily   - Continue atorvastatin 40 mg qd     - nitro PRN (any form)  - morphine PRN  - routine cardiac labs  - telemetry, pulse ox  - trop/EKG for recurrent CP, notify MD  - optimize comorbid conditions     JAMES  - home CPAP     Hypertension  - Continue current therapy     Questionable UTI at OSH  - continue IV CTX to complete 5 day course          Medical Decision Making:   I personally reviewed labs: Yes   I personally reviewed imaging: Yes  I personally reviewed EKG: Yes  Toxic drug monitoring:   Discussed case with:         Code Status:   DVT Prophylaxis:   GI Prophylaxis:    Subjective:     Chief Complaint / Reason for Physician Visit  \"\".  Discussed with RN events overnight.       Objective:     VITALS:   Last 24hrs VS reviewed since prior progress note. Most recent are:  Patient Vitals for the past 24 hrs:   BP Temp Temp src Pulse Resp SpO2 Height Weight   25 1145 125/86 -- -- 70 -- -- -- --   25 1140 129/85 98.7 °F (37.1 °C) Oral 69 16 95 % -- --   25 0815 134/87 98.4 °F (36.9 °C) Oral 73 16 -- -- --   25 0554 -- -- -- -- -- -- -- 132.1 kg (291 lb 3.2 oz)   25 0510 109/69 -- -- 74 -- -- -- --   25 0507 113/80 -- -- 76 -- -- -- --   25 0505 (!) 103/55 98 °F (36.7 °C) Oral 77 17 94 % -- --   25 2315 119/74 97.6 °F (36.4 °C)

## 2025-06-22 NOTE — PROGRESS NOTES
0700. Bedside shift change report given to Carloz LOPEZ (oncoming nurse) by Noris RN (offgoing nurse). Report included the following information Nurse Handoff Report, Index, Intake/Output, MAR, Recent Results, and Cardiac Rhythm NSR.     2330. End of Shift Note    Bedside shift change report given to Magalie RN (oncoming nurse) by Donn Lujan RN (offgoing nurse).  Report included the following information SBAR, Kardex, Intake/Output, MAR, Recent Results, and Cardiac Rhythm NSR    Shift worked:  [id number]     Shift summary and any significant changes:     CTS workup started and Fiser to operate this week.      Concerns for physician to address:       Zone phone for oncoming shift:          Activity:  Level of Assistance: Independent  Number times ambulated in hallways past shift: 0  Number of times OOB to chair past shift: 4    Cardiac:   Cardiac Monitoring: Yes      Cardiac Rhythm: Sinus rhythm    Access:  Current line(s): PIV     Genitourinary:   Urinary Status: Voiding, Bathroom privileges    Respiratory:   O2 Device: None (Room air)  Chronic home O2 use?: NO  Incentive spirometer at bedside: YES    GI:  Last BM (including prior to admit): 06/22/25  Current diet:  ADULT DIET; Regular  Passing flatus: YES    Pain Management:   Patient states pain is manageable on current regimen: YES    Skin:  Terry Scale Score: 22  Interventions: Wound Offloading (Prevention Methods): Bed, pressure reduction mattress, Turning (turns self up ad atilio)    Patient Safety:  Fall Risk: Nursing Judgement-Fall Risk High(Add Comments): No  Fall Risk Interventions  Nursing Judgement-Fall Risk High(Add Comments): No  Toilet Every 2 Hours-In Advance of Need: Yes  Hourly Visual Checks: In bed, Awake, Quiet  Fall Visual Posted: Socks, Fall sign posted  Room Door Open: Deferred to promote rest  Alarm On: Personal  Patient Moved Closer to Nursing Station: No    Active Consults:   IP CONSULT TO CARDIOLOGY  IP CONSULT TO SOCIAL WORK  IP CONSULT TO

## 2025-06-22 NOTE — PROGRESS NOTES
Hospitalist aware patient has c/o soreness on back of head. Some redness with a lump present. Patient reports his head hit the stretcher while in the ambulance. He denies any vision changes/dizziness or any other symptom. Offered ice pack and tylenol. Will continue to monitor and patient will report any changes/symptoms.

## 2025-06-22 NOTE — PROGRESS NOTES
TRANSFER - IN REPORT:    Verbal report received from Lorelei Chery RN on Miko NAPIER Toney  being received from [facility] for ordered procedure / Surgery    Report consisted of patient's Situation, Background, Assessment and   Recommendations(SBAR).     Information from the following report(s) Nurse Handoff Report, ED Encounter Summary, MAR, Recent Results, and Cardiac Rhythm NSR was reviewed with the receiving nurse.    Opportunity for questions and clarification was provided.      Assessment completed upon patient's arrival to unit and care assumed.

## 2025-06-23 ENCOUNTER — APPOINTMENT (OUTPATIENT)
Facility: HOSPITAL | Age: 50
DRG: 280 | End: 2025-06-23
Attending: STUDENT IN AN ORGANIZED HEALTH CARE EDUCATION/TRAINING PROGRAM
Payer: COMMERCIAL

## 2025-06-23 ENCOUNTER — ANESTHESIA EVENT (OUTPATIENT)
Facility: HOSPITAL | Age: 50
End: 2025-06-23
Payer: COMMERCIAL

## 2025-06-23 ENCOUNTER — PREP FOR PROCEDURE (OUTPATIENT)
Age: 50
End: 2025-06-23

## 2025-06-23 PROBLEM — I65.23 BILATERAL CAROTID ARTERY STENOSIS: Status: ACTIVE | Noted: 2025-06-23

## 2025-06-23 PROBLEM — Z01.810 PREOP CARDIOVASCULAR EXAM: Status: ACTIVE | Noted: 2025-06-23

## 2025-06-23 PROBLEM — I25.10 CORONARY ARTERY DISEASE: Status: ACTIVE | Noted: 2025-06-23

## 2025-06-23 LAB
ANION GAP SERPL CALC-SCNC: 5 MMOL/L (ref 2–12)
BUN SERPL-MCNC: 10 MG/DL (ref 6–20)
BUN/CREAT SERPL: 11 (ref 12–20)
CALCIUM SERPL-MCNC: 8.9 MG/DL (ref 8.5–10.1)
CHLORIDE SERPL-SCNC: 105 MMOL/L (ref 97–108)
CO2 SERPL-SCNC: 26 MMOL/L (ref 21–32)
CREAT SERPL-MCNC: 0.87 MG/DL (ref 0.7–1.3)
ECHO BSA: 2.59 M2
ERYTHROCYTE [DISTWIDTH] IN BLOOD BY AUTOMATED COUNT: 13.3 % (ref 11.5–14.5)
GLUCOSE SERPL-MCNC: 109 MG/DL (ref 65–100)
HCT VFR BLD AUTO: 43.3 % (ref 36.6–50.3)
HGB BLD-MCNC: 14.6 G/DL (ref 12.1–17)
Lab: 119 MMHG
Lab: 126 MMHG
MAGNESIUM SERPL-MCNC: 2.1 MG/DL (ref 1.6–2.4)
MCH RBC QN AUTO: 29.4 PG (ref 26–34)
MCHC RBC AUTO-ENTMCNC: 33.7 G/DL (ref 30–36.5)
MCV RBC AUTO: 87.1 FL (ref 80–99)
NRBC # BLD: 0 K/UL (ref 0–0.01)
NRBC BLD-RTO: 0 PER 100 WBC
PLATELET # BLD AUTO: 246 K/UL (ref 150–400)
PMV BLD AUTO: 10.7 FL (ref 8.9–12.9)
POTASSIUM SERPL-SCNC: 3.6 MMOL/L (ref 3.5–5.1)
RBC # BLD AUTO: 4.97 M/UL (ref 4.1–5.7)
SODIUM SERPL-SCNC: 136 MMOL/L (ref 136–145)
TSH SERPL DL<=0.05 MIU/L-ACNC: 3.16 UIU/ML (ref 0.45–4.5)
UFH PPP CHRO-ACNC: 0.41 IU/ML
VAS LEFT 3RD DIGIT BP: 161 MMHG
VAS LEFT ABI: 1.1
VAS LEFT ARM BP: 157 MMHG
VAS LEFT CCA DIST EDV: 21.6 CM/S
VAS LEFT CCA DIST PSV: 80.6 CM/S
VAS LEFT CCA PROX EDV: 30.9 CM/S
VAS LEFT CCA PROX PSV: 94.8 CM/S
VAS LEFT DORSALIS PEDIS BP: 154 MMHG
VAS LEFT ECA EDV: 26.5 CM/S
VAS LEFT ECA PSV: 83 CM/S
VAS LEFT GSV ANKLE DIAM: 3.2 MM
VAS LEFT GSV AT KNEE DIAM: 3.9 MM
VAS LEFT GSV BK MID DIAM: 3 MM
VAS LEFT GSV BK PROX DIAM: 3 MM
VAS LEFT GSV THIGH DIST DIAM: 3.4 MM
VAS LEFT GSV THIGH MID DIAM: 4.5 MM
VAS LEFT GSV THIGH PROX DIAM: 5.1 MM
VAS LEFT ICA DIST EDV: 23.2 CM/S
VAS LEFT ICA DIST PSV: 47.8 CM/S
VAS LEFT ICA MID EDV: 25.1 CM/S
VAS LEFT ICA MID PSV: 51.6 CM/S
VAS LEFT ICA PROX EDV: 33.9 CM/S
VAS LEFT ICA PROX PSV: 70.7 CM/S
VAS LEFT ICA/CCA PSV: 0.9 NO UNITS
VAS LEFT PTA BP: 173 MMHG
VAS LEFT SUBCLAVIAN PROX EDV: 0 CM/S
VAS LEFT SUBCLAVIAN PROX PSV: 96.6 CM/S
VAS LEFT TBI: 0.74
VAS LEFT TOE PRESSURE: 116 MMHG
VAS LEFT VERTEBRAL EDV: 20.4 CM/S
VAS LEFT VERTEBRAL PSV: 43.1 CM/S
VAS RIGHT 3RD DIGIT BP: 134 MMHG
VAS RIGHT ABI: 1.04
VAS RIGHT CCA DIST EDV: 26.5 CM/S
VAS RIGHT CCA DIST PSV: 80.6 CM/S
VAS RIGHT CCA PROX EDV: 21.7 CM/S
VAS RIGHT CCA PROX PSV: 135.8 CM/S
VAS RIGHT DORSALIS PEDIS BP: 153 MMHG
VAS RIGHT ECA EDV: 14.2 CM/S
VAS RIGHT ECA PSV: 76.9 CM/S
VAS RIGHT GSV ANKLE DIAM: 3 MM
VAS RIGHT GSV AT KNEE DIAM: 3 MM
VAS RIGHT GSV BK MID DIAM: 2.4 MM
VAS RIGHT GSV BK PROX DIAM: 2.5 MM
VAS RIGHT GSV THIGH DIST DIAM: 4.5 MM
VAS RIGHT GSV THIGH MID DIAM: 4.4 MM
VAS RIGHT GSV THIGH PROX DIAM: 7.1 MM
VAS RIGHT ICA DIST EDV: 27 CM/S
VAS RIGHT ICA DIST PSV: 57.2 CM/S
VAS RIGHT ICA MID EDV: 26.3 CM/S
VAS RIGHT ICA MID PSV: 54.8 CM/S
VAS RIGHT ICA PROX EDV: 23.2 CM/S
VAS RIGHT ICA PROX PSV: 46.5 CM/S
VAS RIGHT ICA/CCA PSV: 0.7 NO UNITS
VAS RIGHT PTA BP: 164 MMHG
VAS RIGHT SUBCLAVIAN PROX EDV: 5.3 CM/S
VAS RIGHT SUBCLAVIAN PROX PSV: 94.8 CM/S
VAS RIGHT TBI: 0.75
VAS RIGHT TOE PRESSURE: 118 MMHG
VAS RIGHT VERTEBRAL EDV: 14.7 CM/S
VAS RIGHT VERTEBRAL PSV: 38.1 CM/S
WBC # BLD AUTO: 16 K/UL (ref 4.1–11.1)

## 2025-06-23 PROCEDURE — 6360000002 HC RX W HCPCS: Performed by: THORACIC SURGERY (CARDIOTHORACIC VASCULAR SURGERY)

## 2025-06-23 PROCEDURE — 2500000003 HC RX 250 WO HCPCS: Performed by: INTERNAL MEDICINE

## 2025-06-23 PROCEDURE — 93922 UPR/L XTREMITY ART 2 LEVELS: CPT

## 2025-06-23 PROCEDURE — 93880 EXTRACRANIAL BILAT STUDY: CPT

## 2025-06-23 PROCEDURE — 2060000000 HC ICU INTERMEDIATE R&B

## 2025-06-23 PROCEDURE — 6370000000 HC RX 637 (ALT 250 FOR IP): Performed by: STUDENT IN AN ORGANIZED HEALTH CARE EDUCATION/TRAINING PROGRAM

## 2025-06-23 PROCEDURE — 85027 COMPLETE CBC AUTOMATED: CPT

## 2025-06-23 PROCEDURE — 6360000002 HC RX W HCPCS: Performed by: INTERNAL MEDICINE

## 2025-06-23 PROCEDURE — 6370000000 HC RX 637 (ALT 250 FOR IP): Performed by: INTERNAL MEDICINE

## 2025-06-23 PROCEDURE — 83735 ASSAY OF MAGNESIUM: CPT

## 2025-06-23 PROCEDURE — 36415 COLL VENOUS BLD VENIPUNCTURE: CPT

## 2025-06-23 PROCEDURE — 94640 AIRWAY INHALATION TREATMENT: CPT

## 2025-06-23 PROCEDURE — 6360000002 HC RX W HCPCS: Performed by: STUDENT IN AN ORGANIZED HEALTH CARE EDUCATION/TRAINING PROGRAM

## 2025-06-23 PROCEDURE — 85520 HEPARIN ASSAY: CPT

## 2025-06-23 PROCEDURE — 80048 BASIC METABOLIC PNL TOTAL CA: CPT

## 2025-06-23 PROCEDURE — 93880 EXTRACRANIAL BILAT STUDY: CPT | Performed by: PSYCHIATRY & NEUROLOGY

## 2025-06-23 PROCEDURE — 93970 EXTREMITY STUDY: CPT

## 2025-06-23 PROCEDURE — 6370000000 HC RX 637 (ALT 250 FOR IP)

## 2025-06-23 RX ORDER — MUPIROCIN 2 %
OINTMENT (GRAM) TOPICAL 2 TIMES DAILY
Status: DISCONTINUED | OUTPATIENT
Start: 2025-06-24 | End: 2025-06-26

## 2025-06-23 RX ORDER — SPIRONOLACTONE 25 MG/1
25 TABLET ORAL DAILY
Status: DISCONTINUED | OUTPATIENT
Start: 2025-06-23 | End: 2025-06-24

## 2025-06-23 RX ORDER — ALLOPURINOL 300 MG/1
300 TABLET ORAL DAILY
Status: DISCONTINUED | OUTPATIENT
Start: 2025-06-23 | End: 2025-06-30 | Stop reason: HOSPADM

## 2025-06-23 RX ORDER — ALBUTEROL SULFATE 0.83 MG/ML
2.5 SOLUTION RESPIRATORY (INHALATION)
Status: COMPLETED | OUTPATIENT
Start: 2025-06-23 | End: 2025-06-23

## 2025-06-23 RX ORDER — AMIODARONE HYDROCHLORIDE 200 MG/1
400 TABLET ORAL 2 TIMES DAILY
Status: DISCONTINUED | OUTPATIENT
Start: 2025-06-23 | End: 2025-06-26

## 2025-06-23 RX ORDER — CHLORHEXIDINE GLUCONATE ORAL RINSE 1.2 MG/ML
15 SOLUTION DENTAL 2 TIMES DAILY
Status: DISCONTINUED | OUTPATIENT
Start: 2025-06-24 | End: 2025-06-26

## 2025-06-23 RX ORDER — VALACYCLOVIR HYDROCHLORIDE 500 MG/1
500 TABLET, FILM COATED ORAL DAILY
Status: DISCONTINUED | OUTPATIENT
Start: 2025-06-23 | End: 2025-06-26

## 2025-06-23 RX ADMIN — HEPARIN SODIUM AND DEXTROSE 13 UNITS/KG/HR: 10000; 5 INJECTION INTRAVENOUS at 12:52

## 2025-06-23 RX ADMIN — VALACYCLOVIR HYDROCHLORIDE 500 MG: 500 TABLET, FILM COATED ORAL at 09:49

## 2025-06-23 RX ADMIN — CARVEDILOL 6.25 MG: 6.25 TABLET, FILM COATED ORAL at 19:48

## 2025-06-23 RX ADMIN — ALLOPURINOL 300 MG: 300 TABLET ORAL at 09:49

## 2025-06-23 RX ADMIN — SPIRONOLACTONE 25 MG: 25 TABLET ORAL at 09:49

## 2025-06-23 RX ADMIN — CARVEDILOL 6.25 MG: 6.25 TABLET, FILM COATED ORAL at 09:49

## 2025-06-23 RX ADMIN — PANTOPRAZOLE SODIUM 40 MG: 40 TABLET, DELAYED RELEASE ORAL at 06:07

## 2025-06-23 RX ADMIN — AMIODARONE HYDROCHLORIDE 400 MG: 200 TABLET ORAL at 09:53

## 2025-06-23 RX ADMIN — WATER 1000 MG: 1 INJECTION INTRAMUSCULAR; INTRAVENOUS; SUBCUTANEOUS at 12:38

## 2025-06-23 RX ADMIN — AMIODARONE HYDROCHLORIDE 400 MG: 200 TABLET ORAL at 21:27

## 2025-06-23 RX ADMIN — ALBUTEROL SULFATE 2.5 MG: 2.5 SOLUTION RESPIRATORY (INHALATION) at 17:15

## 2025-06-23 RX ADMIN — HYDROCHLOROTHIAZIDE 25 MG: 25 TABLET ORAL at 09:49

## 2025-06-23 RX ADMIN — SENNOSIDES AND DOCUSATE SODIUM 2 TABLET: 50; 8.6 TABLET ORAL at 21:27

## 2025-06-23 RX ADMIN — ATORVASTATIN CALCIUM 40 MG: 40 TABLET, FILM COATED ORAL at 21:27

## 2025-06-23 RX ADMIN — ASPIRIN 81 MG: 81 TABLET, DELAYED RELEASE ORAL at 09:49

## 2025-06-23 ASSESSMENT — PAIN SCALES - GENERAL: PAINLEVEL_OUTOF10: 0

## 2025-06-23 NOTE — PROGRESS NOTES
Comprehensive Nutrition Assessment    Type and Reason for Visit:  Initial, Consult, Patient education    Nutrition Recommendations/Plan:   Cardiac diet   Diet education attached to discharge instructions      Malnutrition Assessment:  Malnutrition Status:  No malnutrition (06/23/25 1406)    Context:  Acute Illness     Findings of the 6 clinical characteristics of malnutrition:  Energy Intake:  No decrease in energy intake  Weight Loss:  No weight loss     Body Fat Loss:  Unable to assess     Muscle Mass Loss:  Unable to assess    Fluid Accumulation:  No fluid accumulation     Strength:  Not Performed    Nutrition Assessment:    Patient medically noted for NSTEMI, CAD, HTN, HLD, GERD, and prediabetes. Possible CABG later this week per notes. Consult received for cardiac diet education (currently receiving a regular diet). Patient unavailable at time of attempted visits today with staff at bedside. Added education to discharge instructions but will plan to follow up post op to answer any questions patient may have. Ensure high protein added TID today per ERAS protocol. Encourage intake of meals and compliance with diet.     Patient Vitals for the past 120 hrs:   PO Meals Eaten (%)   06/21/25 2320 76 - 100%     Nutrition Related Findings:    -156-134, Triglycerides 328, A1c 6.1   BM 6/22   Atorvastatin, HCTZ, Protonix, Senokot, Aldactone   Wound Type: None       Current Nutrition Intake & Therapies:    Average Meal Intake: %     ADULT DIET; Regular  ADULT ORAL NUTRITION SUPPLEMENT; Breakfast, Lunch, Dinner; Low Calorie/High Protein Oral Supplement    Anthropometric Measures:  Height: 182.9 cm (6')  Ideal Body Weight (IBW): 178 lbs (81 kg)       Current Body Weight: 131.5 kg (289 lb 14.5 oz),   IBW.    Current BMI (kg/m2): 39.3                             BMI Categories: Obese Class 2 (BMI 35.0 -39.9)    Estimated Daily Nutrient Needs:  Energy Requirements Based On: Formula  Weight Used for Energy

## 2025-06-23 NOTE — PLAN OF CARE
Problem: Discharge Planning  Goal: Discharge to home or other facility with appropriate resources  6/23/2025 1113 by Kenyatta Crowe RN  Outcome: Progressing  Flowsheets (Taken 6/23/2025 1113)  Discharge to home or other facility with appropriate resources:   Identify barriers to discharge with patient and caregiver   Identify discharge learning needs (meds, wound care, etc)  6/23/2025 0222 by Magalie Mallory RN  Outcome: Progressing     Problem: ABCDS Injury Assessment  Goal: Absence of physical injury  6/23/2025 1113 by Kenyatta Crowe RN  Outcome: Progressing  Flowsheets (Taken 6/23/2025 1113)  Absence of Physical Injury: Implement safety measures based on patient assessment  6/23/2025 0222 by Magalie Mallory RN  Outcome: Progressing     Problem: Cardiovascular - Adult  Goal: Maintains optimal cardiac output and hemodynamic stability  Flowsheets (Taken 6/23/2025 1113)  Maintains optimal cardiac output and hemodynamic stability: Monitor blood pressure and heart rate  Goal: Absence of cardiac dysrhythmias or at baseline  Flowsheets (Taken 6/23/2025 1113)  Absence of cardiac dysrhythmias or at baseline: Monitor cardiac rate and rhythm

## 2025-06-23 NOTE — PROGRESS NOTES
Hospitalist Progress Note    NAME:   Miko Toney   : 1975   MRN: [id number]     Date/Time: 2025 2:18 PM  Patient PCP: Bridger Joshi MD    Estimated discharge date: , Home with OP Cardiac REhab  Barriers: CABG awaited on       Assessment / Plan:    NSTEMI  Due to Multivessel CAD POA- on Cardiac cath, awaiting CABG plans  - admitted to IVCU from [facility] as transfer  - cardiology consulted - following  - Cardiac Surgery consulted- following, CABG on Thursday planned  - anticoagulation:  heparin infusion  - beta blocker: continue coreg 6.25 mg BID  - DAPT: ASA 81 mg daily   - Continue atorvastatin 40 mg qd     - nitro PRN (any form)  - morphine PRN  - routine cardiac labs  - telemetry, pulse ox  - trop/EKG for recurrent CP, notify MD  - optimize comorbid conditions     JAMES  - home CPAP     Hypertension  - Continue current therapy     Questionable UTI at OSH  - continue IV CTX to complete 5 day course          Medical Decision Making:   I personally reviewed labs: Yes   I personally reviewed imaging: Yes  I personally reviewed EKG: Yes  Toxic drug monitoring: PTT monitoring on heparin drip to prevent bleeding toxicity  Discussed case with: Pt, RN        Code Status: Full code  DVT Prophylaxis: IV heparin  GI Prophylaxis: none    Subjective:     Chief Complaint / Reason for Physician Visit:  F/u for NSTEMI, CAD 3 v disease  \"I am ok\".  Discussed with RN events overnight.       Objective:     VITALS:   Last 24hrs VS reviewed since prior progress note. Most recent are:  Patient Vitals for the past 24 hrs:   BP Temp Temp src Pulse Resp SpO2 Weight   25 1131 (!) 129/90 98 °F (36.7 °C) Oral 74 -- 93 % --   25 0930 -- (!) 39.2 °F (4 °C) -- -- -- -- --   25 0752 (!) 144/89 97.8 °F (36.6 °C) Oral 70 18 95 % --   25 0600 -- -- -- -- -- -- 131.5 kg (289 lb 12.8 oz)   25 0546 122/85 -- -- 68 -- -- --   25 0545 129/89 97.9 °F (36.6

## 2025-06-23 NOTE — PROGRESS NOTES
0715 Bedside shift change report given to Kenyatta RN (oncoming nurse) by Magalie RN (offgoing nurse). Report included the following information Nurse Handoff Report, MAR, Recent Results, and Cardiac Rhythm NSR.

## 2025-06-23 NOTE — PROGRESS NOTES
Orders received and chart reviewed. Spoke with RN who states pt is mobilizing independently. CABG to be scheduled later this week. Will d/c acute physical therapy evaluation as pt is mobilizing safely at this time. Please re-order for post-op therapy evaluations when appropriate.    Domitila Pate, PT, DPT

## 2025-06-23 NOTE — PROGRESS NOTES
Occupational Therapy    OT orders received and chart reviewed. Spoke with RN who states pt is up independently in his room. CABG to be scheduled later this week. Will complete Occupational Therapy order.  Please re-order OT post-op.   Julius Jefferson, OTR/L

## 2025-06-23 NOTE — PROGRESS NOTES
End of Shift Note    Bedside shift change report given to Katarzyna LOPEZ (oncoming nurse) by Kenyatta Crowe RN (offgoing nurse).  Report included the following information SBAR, Kardex, MAR, Recent Results, and Cardiac Rhythm NSR    Shift worked:  8714-5680     Shift summary and any significant changes:     HCTZ and spirolactone held by provider.      Concerns for physician to address:  None     Zone phone for oncoming shift:          Activity:  Level of Assistance: Independent  Number times ambulated in hallways past shift: 1  Number of times OOB to chair past shift: 5    Cardiac:   Cardiac Monitoring: Yes      Cardiac Rhythm: Sinus rhythm    Access:  S  Current line(s): PIV     Genitourinary:   Urinary Status: Voiding, Bathroom privileges    Respiratory:   O2 Device: None (Room air)  Chronic home O2 use?: NO  Incentive spirometer at bedside: YES    GI:  Last BM (including prior to admit): 06/22/25  Current diet:  ADULT DIET; Regular  ADULT ORAL NUTRITION SUPPLEMENT; Breakfast, Lunch, Dinner; Low Calorie/High Protein Oral Supplement  ADULT ORAL NUTRITION SUPPLEMENT; Breakfast, Lunch, Dinner; Low Calorie/High Protein Oral Supplement  Passing flatus: YES    Pain Management:   Patient states pain is manageable on current regimen: YES    Skin:  Terry Scale Score: 23  Interventions: Wound Offloading (Prevention Methods): Pillows, Repositioning, Elevate heels    Patient Safety:  Fall Risk: Nursing Judgement-Fall Risk High(Add Comments): No  Fall Risk Interventions  Nursing Judgement-Fall Risk High(Add Comments): No  Toilet Every 2 Hours-In Advance of Need: Yes  Hourly Visual Checks: In bed, Awake, Quiet  Fall Visual Posted: Socks, Fall sign posted  Room Door Open: Deferred to promote rest  Alarm On: Personal  Patient Moved Closer to Nursing Station: No    Active Consults:   IP CONSULT TO CARDIOLOGY  IP CONSULT TO SOCIAL WORK  IP CONSULT TO CASE MANAGEMENT  IP CONSULT TO CARDIOVASCULAR SURGERY  IP CONSULT TO

## 2025-06-23 NOTE — PROGRESS NOTES
Phase 2 outpatient cardiac rehab referral is not appropriate for this patient at this time due to physical and/or mental limitations, diagnosis and/or treatment plans (CABG)

## 2025-06-23 NOTE — DISCHARGE INSTRUCTIONS
[facility]     [address] [facility]                        [address] [place] [address] I  [address]                                                           [address]  [place] [address]                                       [place] [address]  Office- [phone]  Fax- [phone]        Office- [phone]  Fax- [phone]  _________________________________________________________________  Dr. Zechariah Sadler, RACHEL Brasher, MARLI Ramsey, MARLI [address] MARLI [address] MARLI [address]., NP                                                                                                                                                                   Name:Miko NAPIER Toney     Surgery & Date: 6/26/25    Discharge Date: 6/30/25     MEDICATIONS:  Please refer to your After Visit Summary for your medication list. If you do not have a prescription for a new medication, you may purchase the medication over the counter.   DO NOT TAKE ANY MEDICATIONS THAT ARE NOT ON THIS LIST      INSTRUCTIONS:  NO SMOKING OR TOBACCO PRODUCTS  Follow all the instructions in your discharge book  Shower daily. Wash all incisions twice daily. Once in the shower with Dial soap and water, and once over the sink with Dynahex 4 solution and rinse. Do this for 14 days. No lotions, ointments or powder.  Call the office immediately for any redness, swelling, or drainage from your incision.   Take your temperature daily and call for a temperature of 101 degrees or higher or for any symptoms that make you think you have and infection.  Weigh yourself each morning.  Call if you gain more than 5 pounds in 48 hours.  Use the incentive spirometer 6-8 times a  [phone] if you are unable to make either one of these appointments.   [facility] will call with a Cardiac Rehab appointment.  Your appointment with Dr. Wilson will be on 8/13/25 at 11:00.  Office number is [phone].   Consult you primary care physician regarding your influenza & pneumovax vaccines.  Please bring all medications (or a complete list) with you to your appointment.  Do not hesitate to contact our office 24/7 with any questions or concerns. Call the number on your red bracelet ([phone])      Signature:___________________________________________________        Liquiband Dressing Instructions:    Liquiband is a lightweight mesh that’s applied over your incision, then coated with a skin adhesive to create a strong, flexible seal that protects against water and bacteria.    Your healthcare team chose to use Liquiband system on your sternal incision.  Please share the instruction sheet in your discharge packet with your home health nurse.  They will assist with dressing removal 10-14 days after surgery.     Liquiband stays on for 10-14 days. If you notice the edge of the dressing peeling up, trim the edge but do not remove the dressing.    Don’t scratch, rub or pick at it.    Do not apply topical lotions, ointments, or liquids  You may shower and let soap and water run on the dressing, but do not scrub it.  Be sure to lightly pat it dry when you exit the shower.              You have been given a copy of the American Heart Association's Heart Failure Educational Booklet.  Please read over this booklet and take it with you to your next physician appointment with any questions you may have.     For additional resources and to access the American Heart Association's Interactive Workbook \"Healthier Living with Heart Failure - Managing Symptoms and Reducing Risk,\" please scan the QR code below.               Download the Heart Failure [place] Ni: Search in your Google Play

## 2025-06-23 NOTE — PROGRESS NOTES
[facility] FLOOR (Pre-op) Progress Note    Admit Date: 2025  POD: * No surgery found *      Procedure:  Procedure(s):  ON PUMP CORONARY ARTERY BYPASS GRAFT WITH ENDOSCOPIC VEIN HARVESTING, WITH CARDIPLEGIA (NO PAC)(E.R.A.S.)      Subjective/overnight events:   Pt  seen in bed. In NSR, on room air, afebrile. Vital signs stable. No events overnight. Bored. No CP/SOB.     On the following infusions: heparin.  Objective:     BP (!) 144/89   Pulse 70   Temp 97.8 °F (36.6 °C) (Oral)   Resp 18   Ht 1.829 m (6')   Wt 131.5 kg (289 lb 12.8 oz)   SpO2 95%   BMI 39.30 kg/m²   Temp (24hrs), Av.1 °F (36.7 °C), Min:97.8 °F (36.6 °C), Max:98.7 °F (37.1 °C)      Last 24hr Input/Output:    Intake/Output Summary (Last 24 hours) at 2025 0926  Last data filed at 2025 0648  Gross per 24 hour   Intake 611.48 ml   Output 1750 ml   Net -1138.52 ml        Pre-op Workup    Carotid duplex: Pending.    Cardiac cath: 25    CARDIAC PROCEDURE 2025  7:00 PM (Final)    Conclusion  Procedure performed: Coronary angiography, left heart catheterization, left ventriculography.    Indication: Acute non-Q wave myocardial infarction.    Left main is a large-caliber vessel and has no stenosis.  LAD artery is a large-caliber vessel and near its origin ostial/proximal LAD has about 90% stenosis.  Mid LAD artery has some irregularity but no stenosis.  Ramus intermedius is a moderate caliber vessel and there is ostial about 75 to 80% stenosis.  Circumflex artery is a moderate to small caliber vessel and at the ostium there is about 75% stenosis.  Right coronary artery is a very large caliber dominant vessel and proximal segment has no stenosis.  Mid segment has some irregularity at distal segment as a long area of about 75% stenosis and gives rise to very large posterior descending artery.    Left ventriculography reveals ejection fraction is about 60 to 65%.  No segmental wall motion abnormality.    Final impression: Multivessel  Continue statin.   JAMES, on CPAP: Continue.   Asthma: does not see pulmonologist PTA. No meds PTA. Check pre-op PFTs.   GERD: On Protonix PTA; continue per primary team.  Gout: On allopurinol PTA; continue per primary team.  Arthritis: No meds PTA. Multiple orthopedic procedures in the past but ambulates well per patient. Supportive care.   ADHD: On Concerta PTA.  HSV: Valtrex.   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   Obesity: BMI 39.49; recommend medically assisted weight loss program on OP basis.      Fluid and electrolytes: PO. Maintain K+ >4 and magnesium level >2.  Nutrition: ADULT DIET; Regular  ADULT ORAL NUTRITION SUPPLEMENT; Breakfast, Lunch, Dinner; Low Calorie/High Protein Oral Supplement  Activity: OOB all meals and ambulate in halls TID; IS 10-15 x an hour while awake.  Bowel Regimen: Per primary team.   GI ppx: Per primary team.  DVT ppx: SCDs and Heparin gtt  Dispo:  Remain on IVCU pending OR    Signed By: ANDRY Turner - NP

## 2025-06-24 ENCOUNTER — APPOINTMENT (OUTPATIENT)
Facility: HOSPITAL | Age: 50
DRG: 280 | End: 2025-06-24
Attending: STUDENT IN AN ORGANIZED HEALTH CARE EDUCATION/TRAINING PROGRAM
Payer: COMMERCIAL

## 2025-06-24 LAB
ANION GAP SERPL CALC-SCNC: 5 MMOL/L (ref 2–12)
BUN SERPL-MCNC: 15 MG/DL (ref 6–20)
BUN/CREAT SERPL: 14 (ref 12–20)
CALCIUM SERPL-MCNC: 9.6 MG/DL (ref 8.5–10.1)
CHLORIDE SERPL-SCNC: 104 MMOL/L (ref 97–108)
CO2 SERPL-SCNC: 27 MMOL/L (ref 21–32)
CREAT SERPL-MCNC: 1.08 MG/DL (ref 0.7–1.3)
GLUCOSE SERPL-MCNC: 134 MG/DL (ref 65–100)
MAGNESIUM SERPL-MCNC: 2.1 MG/DL (ref 1.6–2.4)
POTASSIUM SERPL-SCNC: 3.6 MMOL/L (ref 3.5–5.1)
SODIUM SERPL-SCNC: 136 MMOL/L (ref 136–145)
UFH PPP CHRO-ACNC: 0.41 IU/ML

## 2025-06-24 PROCEDURE — 85520 HEPARIN ASSAY: CPT

## 2025-06-24 PROCEDURE — 6370000000 HC RX 637 (ALT 250 FOR IP)

## 2025-06-24 PROCEDURE — 6370000000 HC RX 637 (ALT 250 FOR IP): Performed by: STUDENT IN AN ORGANIZED HEALTH CARE EDUCATION/TRAINING PROGRAM

## 2025-06-24 PROCEDURE — 2100000001 HC CVICU R&B

## 2025-06-24 PROCEDURE — 71046 X-RAY EXAM CHEST 2 VIEWS: CPT

## 2025-06-24 PROCEDURE — 2500000003 HC RX 250 WO HCPCS: Performed by: INTERNAL MEDICINE

## 2025-06-24 PROCEDURE — 80048 BASIC METABOLIC PNL TOTAL CA: CPT

## 2025-06-24 PROCEDURE — 83735 ASSAY OF MAGNESIUM: CPT

## 2025-06-24 PROCEDURE — 2060000000 HC ICU INTERMEDIATE R&B

## 2025-06-24 PROCEDURE — 6360000002 HC RX W HCPCS: Performed by: INTERNAL MEDICINE

## 2025-06-24 PROCEDURE — 6370000000 HC RX 637 (ALT 250 FOR IP): Performed by: INTERNAL MEDICINE

## 2025-06-24 PROCEDURE — 36415 COLL VENOUS BLD VENIPUNCTURE: CPT

## 2025-06-24 PROCEDURE — 6360000002 HC RX W HCPCS

## 2025-06-24 RX ORDER — PANTOPRAZOLE SODIUM 40 MG/1
40 TABLET, DELAYED RELEASE ORAL
Status: DISCONTINUED | OUTPATIENT
Start: 2025-06-24 | End: 2025-06-26

## 2025-06-24 RX ADMIN — WATER 1000 MG: 1 INJECTION INTRAMUSCULAR; INTRAVENOUS; SUBCUTANEOUS at 11:44

## 2025-06-24 RX ADMIN — MUPIROCIN: 20 OINTMENT TOPICAL at 21:27

## 2025-06-24 RX ADMIN — AMIODARONE HYDROCHLORIDE 400 MG: 200 TABLET ORAL at 09:54

## 2025-06-24 RX ADMIN — ATORVASTATIN CALCIUM 40 MG: 40 TABLET, FILM COATED ORAL at 21:27

## 2025-06-24 RX ADMIN — VALACYCLOVIR HYDROCHLORIDE 500 MG: 500 TABLET, FILM COATED ORAL at 09:54

## 2025-06-24 RX ADMIN — 0.12% CHLORHEXIDINE GLUCONATE 15 ML: 1.2 RINSE ORAL at 09:54

## 2025-06-24 RX ADMIN — ALLOPURINOL 300 MG: 300 TABLET ORAL at 09:54

## 2025-06-24 RX ADMIN — HEPARIN SODIUM AND DEXTROSE 13 UNITS/KG/HR: 10000; 5 INJECTION INTRAVENOUS at 04:51

## 2025-06-24 RX ADMIN — AMIODARONE HYDROCHLORIDE 400 MG: 200 TABLET ORAL at 21:26

## 2025-06-24 RX ADMIN — MUPIROCIN: 20 OINTMENT TOPICAL at 09:56

## 2025-06-24 RX ADMIN — CARVEDILOL 6.25 MG: 6.25 TABLET, FILM COATED ORAL at 17:40

## 2025-06-24 RX ADMIN — HEPARIN SODIUM AND DEXTROSE 13 UNITS/KG/HR: 10000; 5 INJECTION INTRAVENOUS at 18:35

## 2025-06-24 RX ADMIN — ASPIRIN 81 MG: 81 TABLET, DELAYED RELEASE ORAL at 09:54

## 2025-06-24 RX ADMIN — 0.12% CHLORHEXIDINE GLUCONATE 15 ML: 1.2 RINSE ORAL at 21:00

## 2025-06-24 RX ADMIN — SENNOSIDES AND DOCUSATE SODIUM 2 TABLET: 50; 8.6 TABLET ORAL at 21:26

## 2025-06-24 RX ADMIN — CARVEDILOL 6.25 MG: 6.25 TABLET, FILM COATED ORAL at 09:53

## 2025-06-24 RX ADMIN — PANTOPRAZOLE SODIUM 40 MG: 40 TABLET, DELAYED RELEASE ORAL at 07:24

## 2025-06-24 ASSESSMENT — PAIN SCALES - GENERAL: PAINLEVEL_OUTOF10: 0

## 2025-06-24 NOTE — PLAN OF CARE
Problem: Discharge Planning  Goal: Discharge to home or other facility with appropriate resources  6/24/2025 1049 by Rosalia Paige RN  Outcome: Progressing  Flowsheets (Taken 6/24/2025 1049)  Discharge to home or other facility with appropriate resources:   Identify discharge learning needs (meds, wound care, etc)   Arrange for needed discharge resources and transportation as appropriate   Identify barriers to discharge with patient and caregiver  6/24/2025 0245 by Katarzyna Rubin RN  Outcome: Progressing     Problem: ABCDS Injury Assessment  Goal: Absence of physical injury  Outcome: Progressing  Flowsheets (Taken 6/24/2025 1049)  Absence of Physical Injury: Implement safety measures based on patient assessment     Problem: Cardiovascular - Adult  Goal: Maintains optimal cardiac output and hemodynamic stability  6/24/2025 1049 by Rosalia Paige RN  Outcome: Progressing  Flowsheets (Taken 6/24/2025 1049)  Maintains optimal cardiac output and hemodynamic stability:   Monitor blood pressure and heart rate   Monitor urine output and notify Licensed Independent Practitioner for values outside of normal range   Assess for signs of decreased cardiac output  6/24/2025 0245 by Katarzyna Rubin RN  Outcome: Progressing  Goal: Absence of cardiac dysrhythmias or at baseline  6/24/2025 1049 by Rosalia Paige RN  Outcome: Progressing  Flowsheets (Taken 6/24/2025 1049)  Absence of cardiac dysrhythmias or at baseline:   Monitor cardiac rate and rhythm   Assess for signs of decreased cardiac output   Administer antiarrhythmia medication and electrolyte replacement as ordered  6/24/2025 0245 by Katarzyna Rubin RN  Outcome: Progressing     Problem: Skin/Tissue Integrity - Adult  Goal: Incisions, wounds, or drain sites healing without S/S of infection  6/24/2025 1049 by Rosalia Paige RN  Outcome: Progressing  Flowsheets (Taken 6/24/2025 1049)  Incisions, Wounds, or Drain Sites

## 2025-06-24 NOTE — CARE COORDINATION
Care Management Initial Assessment       RUR: 11%  Readmission? Yes -transfer from [facility] for cardiology work up   1st IM letter given? No - [place] [employer]  1st  letter given: No    CM introduced self and role to pt, verified pt demographics, insurance info,emergency contact and ACD.  Pt lives alone in one story home. Level entry.Pt independent ADL's, ambulates, drives.    DME: none  HH:  none  SNF:  none     Disposition  Home withHH vs IPR-  Pending- clinical progress, Scheduled for CABG on 6/26/25    CM will continue to follow pt for D/C planning and arrange services as deemed neccessary     06/24/25 4099   Service Assessment   Patient Orientation Alert and Oriented   Cognition Alert   History Provided By Patient   Primary Caregiver Self   Support Systems Friends/Neighbors   Patient's Healthcare Decision Maker is: Named in Scanned ACP Document   PCP Verified by CM Yes   Last Visit to PCP Within last 3 months   Prior Functional Level Independent in ADLs/IADLs   Current Functional Level Independent in ADLs/IADLs   Can patient return to prior living arrangement Yes   Ability to make needs known: Good   Family able to assist with home care needs: Yes   Would you like for me to discuss the discharge plan with any other family members/significant others, and if so, who? Yes  (Spouse - Radha Lester)   Financial Resources Other (Comment)   Community Resources Legal Services   CM/SW Referral Other (see comment)   Social/Functional History   Lives With Alone   Type of Home House   Home Layout One level   Bathroom Shower/Tub Tub/Shower unit   Bathroom Equipment None   Home Equipment None   Receives Help From Friend(s)   Prior Level of Assist for ADLs Independent   Prior Level of Assist for Homemaking Independent   Ambulation Assistance Independent   Prior Level of Assist for Transfers Independent   Active  Yes   Mode of Transportation Car   Discharge Planning   Type of Residence House   Living Arrangements Alone

## 2025-06-24 NOTE — PLAN OF CARE
End of Shift Note    Bedside shift change report given to JOHN Lindsey (oncoming nurse) by Katarzyna Rubin RN (offgoing nurse).  Report included the following information SBAR, Procedure Summary, and MAR    Shift worked:  [id number]     Shift summary and any significant changes:    1900 Heparin gtt infusing at 13 units/kg/hr       Concerns for physician to address:  None     Zone phone for oncoming shift:   None       Activity:  Level of Assistance: Independent  Number times ambulated in hallways past shift: 1  Number of times OOB to chair past shift: 1    Cardiac:   Cardiac Monitoring: Yes      Cardiac Rhythm: Sinus rhythm    Access:  Current line(s): PIV     Genitourinary:   Urinary Status: Voiding, Bathroom privileges    Respiratory:   O2 Device: None (Room air)  Chronic home O2 use?: NO  Incentive spirometer at bedside: YES    GI:  Last BM (including prior to admit): 06/23/25  Current diet:  ADULT DIET; Regular  ADULT ORAL NUTRITION SUPPLEMENT; Breakfast, Lunch, Dinner; Low Calorie/High Protein Oral Supplement  ADULT ORAL NUTRITION SUPPLEMENT; Breakfast, Lunch, Dinner; Low Calorie/High Protein Oral Supplement  Passing flatus: YES    Pain Management:   Patient states pain is manageable on current regimen: YES    Skin:  Terry Scale Score: 21  Interventions: Wound Offloading (Prevention Methods): Repositioning    Patient Safety:  Fall Risk: Nursing Judgement-Fall Risk High(Add Comments): No  Fall Risk Interventions  Nursing Judgement-Fall Risk High(Add Comments): No  Toilet Every 2 Hours-In Advance of Need: Yes  Hourly Visual Checks: Awake, In bed  Fall Visual Posted: Socks  Room Door Open: Deferred to promote rest  Alarm On: Bed  Patient Moved Closer to Nursing Station: No    Active Consults:   IP CONSULT TO CARDIOLOGY  IP CONSULT TO SOCIAL WORK  IP CONSULT TO CASE MANAGEMENT  IP CONSULT TO CARDIOVASCULAR SURGERY  IP CONSULT TO DIETITIAN    Length of Stay:  Expected LOS: 5  Actual LOS: 3    Katarzyna Rubin

## 2025-06-24 NOTE — PROGRESS NOTES
Hospitalist Progress Note    NAME:   Miko Toney   : 1975   MRN: [id number]     Date/Time: 2025 09:00 AM  Patient PCP: Bridger Joshi MD    Estimated discharge date: , Home with OP Cardiac R3hab  Barriers: CABG awaited on  (delayed due to Brillinta Washout)      Assessment / Plan:    NSTEMI  Due to Multivessel CAD POA- on Cardiac cath, awaiting CABG on  after Brillinta washout  - admitted to IVCU from [facility] as transfer  - cardiology consulted - following  - Cardiac Surgery consulted- following, CABG on Thursday planned, preop workup in progress  - anticoagulation:  heparin infusion  - beta blocker: continue coreg 6.25 mg BID  - DAPT: ASA 81 mg daily   - Continue atorvastatin 40 mg qd     - nitro PRN (any form)  - morphine PRN  - routine cardiac labs  - telemetry, pulse ox  - trop/EKG for recurrent CP, notify MD  - optimize comorbid conditions     JAMES  - home CPAP     Hypertension  - Continue current therapy     Questionable UTI at OSH  - continue IV CTX to complete 5 day course          Medical Decision Making:   I personally reviewed labs: Yes   I personally reviewed imaging: Yes  I personally reviewed EKG: Yes  Toxic drug monitoring: PTT monitoring on heparin drip to prevent bleeding toxicity  Discussed case with: Pt, RN        Code Status: Full code  DVT Prophylaxis: IV heparin  GI Prophylaxis: none    Subjective:     Chief Complaint / Reason for Physician Visit:  F/u for NSTEMI, CAD 3 v disease  \"I am ok\".  Discussed with RN events overnight.       Objective:     VITALS:   Last 24hrs VS reviewed since prior progress note. Most recent are:  Patient Vitals for the past 24 hrs:   BP Temp Temp src Pulse Resp SpO2 Weight   25 0953 129/84 -- -- 66 -- -- --   25 0745 123/82 97.8 °F (36.6 °C) Oral 66 16 94 % --   25 0451 135/77 97.9 °F (36.6 °C) Oral 72 16 93 % 131.1 kg (289 lb)   25 2307 (!) 135/95 98.3 °F (36.8 °C)

## 2025-06-24 NOTE — PROGRESS NOTES
[facility] FLOOR Progress Note    Admit Date: 2025  POD: * No surgery found *      Procedure:    25 Left heart cath / coronary angiography by Dr. Nithin Villafana      Objective:   Painfree. SR. RA.    /84   Pulse 66   Temp 97.8 °F (36.6 °C) (Oral)   Resp 16   Ht 1.829 m (6')   Wt 131.1 kg (289 lb)   SpO2 94%   BMI 39.20 kg/m²   Temp (24hrs), Av.1 °F (36.7 °C), Min:97.8 °F (36.6 °C), Max:98.6 °F (37 °C)      Last 24hr Input/Output:    Intake/Output Summary (Last 24 hours) at 2025 1017  Last data filed at 2025 0451  Gross per 24 hour   Intake 300 ml   Output 1625 ml   Net -1325 ml        EKG/Rhythm: SR       Oxygen:RA     CXR:pending      Admission Weight: Last Weight   Weight - Scale: 132.1 kg (291 lb 3.2 oz) Weight - Scale: 131.1 kg (289 lb)       EXAM:  General: Obese man in NAD sitting in the chair.    Lungs:   Clear to auscultation bilaterally.       Heart:  Regular rate and rhythm, S1, S2 normal, no murmur, click, rub or gallop.   Abdomen:   Soft, non-tender. Distended according to habitus. Bowel sounds normal. No masses,  No organomegaly.   Extremities:  No edema. PPP   Neurologic:  Gross motor and sensory apparatus intact.     Activity: ad atilio    Diet:  carb controlled.  Protein supplements.     Lab Data Reviewed:   Recent Labs     25  1118 25  0539 25  0457   WBC  --  16.0*  --    HGB  --  14.6  --    HCT  --  43.3  --    PLT  --  246  --    NA  --  136 136   K  --  3.6 3.6   BUN  --  10 15   INR 1.0  --   --          Assessment:     Principal Problem:    CAD, multiple vessel  Active Problems:    NSTEMI (non-ST elevated myocardial infarction) ([facility])    ACS (acute coronary syndrome) ([facility])    Preop cardiovascular exam    Bilateral carotid artery stenosis  Resolved Problems:    * No resolved hospital problems. *           Plan/Recommendations/Medical Decision Making:   NSTEMI/MVCAD:  Received 180 mg of Brilinta on 25. Surgery delayed due to Brilinta washout.

## 2025-06-24 NOTE — PROGRESS NOTES
I attempted to see the patient this afternoon, however, patient not in his room, will follow from far as needed. For CABG later this week    Thank you for allowing us to participate in the care of this patient.  We will follow.

## 2025-06-24 NOTE — PROGRESS NOTES
1752: Patient's wife is concerned about patient's feet being cold and discolored, patient denies having numbness or tingling on BLE pedal and post tibial pulses are 2+. MD Trejo notified via [employer].  1830: TRANSFER - OUT REPORT:    Verbal report given to Pamela LOPEZ on Miko Toney  being transferred to CVICU for routine progression of patient care       Report consisted of patient's Situation, Background, Assessment and   Recommendations(SBAR).     Information from the following report(s) Nurse Handoff Report, Adult Overview, Cardiac Rhythm Sinus Rythm, and Neuro Assessment was reviewed with the receiving nurse.           Lines:   Peripheral IV 06/20/25 Posterior;Right Forearm (Active)   Site Assessment Clean, dry & intact 06/24/25 1500   Line Status Infusing 06/24/25 1500   Line Care Connections checked and tightened 06/24/25 1500   Phlebitis Assessment No symptoms 06/24/25 1500   Infiltration Assessment 0 06/24/25 1500   Alcohol Cap Used Yes 06/24/25 1500   Dressing Status Clean, dry & intact 06/24/25 1500   Dressing Type Transparent 06/24/25 1500       Peripheral IV 06/21/25 Right;Posterior Hand (Active)   Site Assessment Clean, dry & intact 06/24/25 1500   Line Status Normal saline locked 06/24/25 1500   Line Care Connections checked and tightened 06/24/25 1500   Phlebitis Assessment No symptoms 06/24/25 1500   Infiltration Assessment 0 06/24/25 1500   Alcohol Cap Used Yes 06/24/25 1500   Dressing Status Clean, dry & intact 06/24/25 1500   Dressing Type Transparent 06/24/25 1500        Opportunity for questions and clarification was provided.      Patient transported with:  Monitor, Registered Nurse, and Tech

## 2025-06-25 LAB
ANION GAP SERPL CALC-SCNC: 7 MMOL/L (ref 2–12)
BUN SERPL-MCNC: 19 MG/DL (ref 6–20)
BUN/CREAT SERPL: 20 (ref 12–20)
CALCIUM SERPL-MCNC: 9.3 MG/DL (ref 8.5–10.1)
CHLORIDE SERPL-SCNC: 106 MMOL/L (ref 97–108)
CO2 SERPL-SCNC: 25 MMOL/L (ref 21–32)
CREAT SERPL-MCNC: 0.96 MG/DL (ref 0.7–1.3)
ERYTHROCYTE [DISTWIDTH] IN BLOOD BY AUTOMATED COUNT: 13.1 % (ref 11.5–14.5)
GLUCOSE SERPL-MCNC: 116 MG/DL (ref 65–100)
HCT VFR BLD AUTO: 43.1 % (ref 36.6–50.3)
HGB BLD-MCNC: 14.5 G/DL (ref 12.1–17)
HISTORY CHECK: NORMAL
MCH RBC QN AUTO: 30 PG (ref 26–34)
MCHC RBC AUTO-ENTMCNC: 33.6 G/DL (ref 30–36.5)
MCV RBC AUTO: 89 FL (ref 80–99)
NRBC # BLD: 0 K/UL (ref 0–0.01)
NRBC BLD-RTO: 0 PER 100 WBC
PLATELET # BLD AUTO: 249 K/UL (ref 150–400)
PMV BLD AUTO: 11.3 FL (ref 8.9–12.9)
POTASSIUM SERPL-SCNC: 3.6 MMOL/L (ref 3.5–5.1)
RBC # BLD AUTO: 4.84 M/UL (ref 4.1–5.7)
SODIUM SERPL-SCNC: 138 MMOL/L (ref 136–145)
UFH PPP CHRO-ACNC: 0.56 IU/ML
WBC # BLD AUTO: 18.1 K/UL (ref 4.1–11.1)

## 2025-06-25 PROCEDURE — 6370000000 HC RX 637 (ALT 250 FOR IP): Performed by: STUDENT IN AN ORGANIZED HEALTH CARE EDUCATION/TRAINING PROGRAM

## 2025-06-25 PROCEDURE — 86900 BLOOD TYPING SEROLOGIC ABO: CPT

## 2025-06-25 PROCEDURE — 2060000000 HC ICU INTERMEDIATE R&B

## 2025-06-25 PROCEDURE — 36415 COLL VENOUS BLD VENIPUNCTURE: CPT

## 2025-06-25 PROCEDURE — 85520 HEPARIN ASSAY: CPT

## 2025-06-25 PROCEDURE — 80048 BASIC METABOLIC PNL TOTAL CA: CPT

## 2025-06-25 PROCEDURE — 2500000003 HC RX 250 WO HCPCS: Performed by: INTERNAL MEDICINE

## 2025-06-25 PROCEDURE — 86923 COMPATIBILITY TEST ELECTRIC: CPT

## 2025-06-25 PROCEDURE — 6370000000 HC RX 637 (ALT 250 FOR IP)

## 2025-06-25 PROCEDURE — 86850 RBC ANTIBODY SCREEN: CPT

## 2025-06-25 PROCEDURE — 6360000002 HC RX W HCPCS: Performed by: INTERNAL MEDICINE

## 2025-06-25 PROCEDURE — 85027 COMPLETE CBC AUTOMATED: CPT

## 2025-06-25 PROCEDURE — 6370000000 HC RX 637 (ALT 250 FOR IP): Performed by: INTERNAL MEDICINE

## 2025-06-25 PROCEDURE — 6360000002 HC RX W HCPCS

## 2025-06-25 PROCEDURE — 86901 BLOOD TYPING SEROLOGIC RH(D): CPT

## 2025-06-25 RX ORDER — ACETAMINOPHEN 500 MG
1000 TABLET ORAL ONCE
Status: COMPLETED | OUTPATIENT
Start: 2025-06-25 | End: 2025-06-26

## 2025-06-25 RX ORDER — ALPRAZOLAM 0.25 MG
0.25 TABLET ORAL NIGHTLY PRN
Status: DISCONTINUED | OUTPATIENT
Start: 2025-06-25 | End: 2025-06-26

## 2025-06-25 RX ORDER — GABAPENTIN 300 MG/1
300 CAPSULE ORAL ONCE
Status: DISCONTINUED | OUTPATIENT
Start: 2025-06-25 | End: 2025-06-25 | Stop reason: SDUPTHER

## 2025-06-25 RX ADMIN — AMIODARONE HYDROCHLORIDE 400 MG: 200 TABLET ORAL at 20:34

## 2025-06-25 RX ADMIN — 0.12% CHLORHEXIDINE GLUCONATE 15 ML: 1.2 RINSE ORAL at 08:44

## 2025-06-25 RX ADMIN — PANTOPRAZOLE SODIUM 40 MG: 40 TABLET, DELAYED RELEASE ORAL at 08:44

## 2025-06-25 RX ADMIN — VALACYCLOVIR HYDROCHLORIDE 500 MG: 500 TABLET, FILM COATED ORAL at 08:45

## 2025-06-25 RX ADMIN — HEPARIN SODIUM AND DEXTROSE 13 UNITS/KG/HR: 10000; 5 INJECTION INTRAVENOUS at 10:01

## 2025-06-25 RX ADMIN — WATER 1000 MG: 1 INJECTION INTRAMUSCULAR; INTRAVENOUS; SUBCUTANEOUS at 12:30

## 2025-06-25 RX ADMIN — ASPIRIN 81 MG: 81 TABLET, DELAYED RELEASE ORAL at 08:44

## 2025-06-25 RX ADMIN — CARVEDILOL 6.25 MG: 6.25 TABLET, FILM COATED ORAL at 17:06

## 2025-06-25 RX ADMIN — MUPIROCIN: 20 OINTMENT TOPICAL at 20:35

## 2025-06-25 RX ADMIN — 0.12% CHLORHEXIDINE GLUCONATE 15 ML: 1.2 RINSE ORAL at 20:34

## 2025-06-25 RX ADMIN — ATORVASTATIN CALCIUM 40 MG: 40 TABLET, FILM COATED ORAL at 20:34

## 2025-06-25 RX ADMIN — CARVEDILOL 6.25 MG: 6.25 TABLET, FILM COATED ORAL at 08:43

## 2025-06-25 RX ADMIN — ALLOPURINOL 300 MG: 300 TABLET ORAL at 08:44

## 2025-06-25 RX ADMIN — AMIODARONE HYDROCHLORIDE 400 MG: 200 TABLET ORAL at 08:43

## 2025-06-25 RX ADMIN — SENNOSIDES AND DOCUSATE SODIUM 2 TABLET: 50; 8.6 TABLET ORAL at 20:34

## 2025-06-25 RX ADMIN — MUPIROCIN: 20 OINTMENT TOPICAL at 08:44

## 2025-06-25 ASSESSMENT — PAIN SCALES - GENERAL
PAINLEVEL_OUTOF10: 0

## 2025-06-25 NOTE — PROGRESS NOTES
Cardiac Surgery Care Coordinator met with Miko Toney. Introduced role of the Cardiac Surgery Care Coordinator.  Reviewed plan of care and began pre-op education.  Discussed day of surgery expectations for the pt and family.  Instructed pt on the proper use of the incentive spirometer. He is able to pull 2500cc.  Provided and reviewed material in the ER[facility] educational binder including Cardiac Surgery Pathway. Reinforced sternal precautions and keeping your move in the tube. Encouraged Miko Toney to verbalize and offered emotional support. Flores Shah RN

## 2025-06-25 NOTE — PROGRESS NOTES
Hospitalist Progress Note    NAME:   Miko Toney   : 1975   MRN: [id number]     Date/Time: 2025 09:00 AM  Patient PCP: Bridger Joshi MD    Estimated discharge date: , Home with OP Cardiac R3hab  Barriers: CABG awaited on  (delayed due to Brillinta Washout)      Assessment / Plan:    NSTEMI  Due to Multivessel CAD POA- on Cardiac cath, awaiting CABG on  after Brillinta washout  - admitted to IVCU from [facility] as transfer  - cardiology consulted - following  - Cardiac Surgery consulted- following, CABG on Thursday planned, preop workup in progress  - anticoagulation:  heparin infusion  - beta blocker: continue coreg 6.25 mg BID  - DAPT: ASA 81 mg daily   - Continue atorvastatin 40 mg qd     - nitro PRN (any form)  - morphine PRN  - routine cardiac labs  - telemetry, pulse ox  - trop/EKG for recurrent CP, notify MD  - optimize comorbid conditions     Asymptomatic Leukocytosis POA- no left shift, afebrile, no signs of any infection, likely insignificant unless fever spikes  Presumed UTI at OSH POA- ruled out with neg urine cx  Initial Blood Cx= neg x  5 days now  - continue IV Ceftriaxone to complete 5 days empiric  Outpatient Hematology Referral recommended      JAMES  - home CPAP     Hypertension  - Continue current therapy               Medical Decision Making:   I personally reviewed labs: Yes   I personally reviewed imaging: Yes  I personally reviewed EKG: Yes  Toxic drug monitoring: PTT monitoring on heparin drip to prevent bleeding toxicity  Discussed case with: Pt, RN, CTSurgery NP (Viktoriya)        Code Status: Full code  DVT Prophylaxis: IV heparin  GI Prophylaxis: none    Subjective:     Chief Complaint / Reason for Physician Visit:  F/u for NSTEMI, CAD 3 v disease, asymptomatic Leukocytosis  \"I am ok\".  Discussed with RN events overnight.     Pt planned for CABG tomorrow-- will transfer service to Cardiac Surgery team in AM- discussed      LABS:  I reviewed today's most current labs and imaging studies.  Pertinent labs include:  Recent Labs     06/23/25  0539 06/25/25  0611   WBC 16.0* 18.1*   HGB 14.6 14.5   HCT 43.3 43.1    249     Recent Labs     06/22/25  1118 06/23/25  0539 06/24/25  0457 06/25/25  0611   NA  --  136 136 138   K  --  3.6 3.6 3.6   CL  --  105 104 106   CO2  --  26 27 25   GLUCOSE  --  109* 134* 116*   BUN  --  10 15 19   CREATININE  --  0.87 1.08 0.96   CALCIUM  --  8.9 9.6 9.3   MG  --  2.1 2.1  --    INR 1.0  --   --   --        Signed: Dante Trejo MD

## 2025-06-25 NOTE — PROGRESS NOTES
CVICU End of Shift Note    Bedside shift change report given to JOHN Johnson (oncoming nurse) by Bridger Schulz RN (offgoing nurse).  Report included the following information SBAR, ED Summary, OR Summary, Intake/Output, and Recent Results    Shift worked:  7p - 7a     Shift summary and any significant changes:       Concerns for physician to address:         Activity:  Level of Assistance: Independent  Number times ambulated in hallways past shift: 0  Number of times OOB to chair past shift: 0    Neurologic:  Level of Consciousness: Alert (0)  Orientation Level: Oriented X4  Cognition: Follows commands  Speech: Clear    Cardiac:   Cardiac Rhythm: Sinus rhythm    Pacer Wires:            Pacer wires Capped?:        Pacer Wire Care Completed:     Respiratory:   O2 Device: None (Room air)  ISS Teaching YES   Use : YES     Volume: 2000  Incentive Spirometry Tx  Treatment Effort: Independent     Genitourinary:   Urinary Status: Voiding    GI:  Last BM (including prior to admit): 06/24/25  Current diet:  ADULT ORAL NUTRITION SUPPLEMENT; Breakfast, Lunch, Dinner; Low Calorie/High Protein Oral Supplement  ADULT ORAL NUTRITION SUPPLEMENT; Breakfast, Lunch, Dinner; Low Calorie/High Protein Oral Supplement  ADULT DIET; Regular; 4 carb choices (60 gm/meal)  Passing flatus: YES    Lines/Drains/Airway:  Peripheral IV    Peripheral IV x 2: YES        Skin:    Wound Care Documentation:         Terry Scale Score: 23  Interventions: Wound Offloading (Prevention Methods): Bed, pressure redistribution/air, Bed, pressure reduction mattress, Pillows    Incision Care Completed This Shift:     CHG Bath Completed This Shift: YES    Pain Management:   Excellent - Able to sleep and participate with therapy     Patient Safety:  Fall Risk: Nursing Judgement-Fall Risk High(Add Comments): Yes  Fall Risk Interventions  Nursing Judgement-Fall Risk High(Add Comments): Yes  Toilet Every 2 Hours-In Advance of Need: Yes  Hourly Visual Checks: In

## 2025-06-25 NOTE — CONSENT
Informed Consent for Blood Component Transfusion Note    I have discussed with the patient the rationale for blood component transfusion; its benefits in treating or preventing fatigue, organ damage, or death; and its risk which includes mild transfusion reactions, rare risk of blood borne infection, or more serious but rare reactions. I have discussed the alternatives to transfusion, including the risk and consequences of not receiving transfusion. The patient had an opportunity to ask questions and had agreed to proceed with transfusion of blood components.    Electronically signed by ANDRY Gabriel NP on 6/25/25 at 8:38 AM EDT

## 2025-06-25 NOTE — PROGRESS NOTES
0720 Bedside shift change report given to JOHN Johnson (oncoming nurse) by JOHN Salinas (offgoing nurse). Report included the following information Nurse Handoff Report, Adult Overview, Intake/Output, MAR, Recent Results, and Cardiac Rhythm SR.     0832 Shift assessment completed. Patient in good spirits, making jokes to deal with stress relating to upcoming surgery. Patient up ad atilio, ambulating well with IV pole, no complaints of pain, on room air.    0935 JOHN Stephen CCL taking patient to walk outside.    1120 Bedside shift change report given to JOHN Souza (oncoming nurse) by JOHN Johnson (offgoing nurse). Report included the following information Nurse Handoff Report, Adult Overview, Intake/Output, MAR, Recent Results, and Cardiac Rhythm SR.

## 2025-06-25 NOTE — PROGRESS NOTES
Spiritual Health History and Assessment/Progress Note  [facility]    Advance Care Planning,  ,  ,      Name: Miko Toney MRN: [id number]    Age: 49 y.o.     Sex: male   Language: English   Rastafari: Other   CAD, multiple vessel     Date: 6/25/2025            Total Time Calculated: 40 min              Spiritual Assessment began in [facility]        Referral/Consult From: Nurse   Encounter Overview/Reason: Advance Care Planning  Service Provided For: Patient    Halima, Belief, Meaning:   Patient identifies as spiritual  Family/Friends No family/friends present      Importance and Influence:  Patient has spiritual/personal beliefs that influence decisions regarding their health  Family/Friends No family/friends present    Community:  Patient feels well-supported. Support system includes: Spouse/Partner and Children  Family/Friends No family/friends present    Assessment and Plan of Care:     Patient Interventions include: Facilitated expression of thoughts and feelings, Explored spiritual coping/struggle/distress, Affirmed coping skills/support systems, and Assisted in Advance Care Planning conversation  Family/Friends Interventions include: No family/friends present    Patient Plan of Care: No spiritual needs identified for follow-up and Spiritual Care available upon further referral  Family/Friends Plan of Care: No family/friends present    Electronically signed by Chaplain BASIL MANN on 6/25/2025 at 12:40 PM

## 2025-06-25 NOTE — PROGRESS NOTES
[facility] FLOOR (Pre-op) Progress Note    Admit Date: 2025  POD: * No surgery found *      Procedure:  Procedure(s):  ON PUMP CORONARY ARTERY BYPASS GRAFT WITH ENDOSCOPIC VEIN HARVESTING, WITH CARDIPLEGIA (NO PAC)(E.R.A.S.)      Subjective/overnight events:   Pt discussed with Dr. Matt, up in the chair. Previously seen ambulating in hallways. In NSR, on room air, afebrile. Vital signs stable. No events overnight. No CP or SOB. Bored. Wants to go outside.       On the following infusions: heparin.  Objective:     /73   Pulse 63   Temp 97.6 °F (36.4 °C) (Axillary)   Resp 16   Ht 1.829 m (6')   Wt 131.1 kg (289 lb)   SpO2 98%   BMI 39.20 kg/m²   Temp (24hrs), Av °F (36.7 °C), Min:97.6 °F (36.4 °C), Max:98.6 °F (37 °C)      Last 24hr Input/Output:    Intake/Output Summary (Last 24 hours) at 2025 0520  Last data filed at 2025 0000  Gross per 24 hour   Intake 807.14 ml   Output 1900 ml   Net -1092.86 ml        Pre-op Workup    Carotid duplex, 25:     No stenosis in the right internal carotid artery.    No stenosis in the left internal carotid artery.    Normal antegrade flow involving the right vertebral artery.    Normal antegrade flow involving the left vertebral artery.    Allens Test, 25:   Interpretation Summary  Show Result Comparison     There are no changes to right PPG signals with radial artery compression indicative of a complete arch.    There is diminished left PPG waveforms with radial artery compression indicative of incomplete arch.        Procedure Details    A spectral Doppler analysis ultrasound was performed. Continuous wave doppler, pulsed volume recording (PVR) and photo plethysmography was performed. Overall the study quality was adequate. Study was technically difficult due to: patient movement.     Upper Extremity Arterial Findings    Right Upper Arterial    There are no changes to right PPG signals with radial artery compression indicative of a complete arch.

## 2025-06-25 NOTE — ACP (ADVANCE CARE PLANNING)
Advance Care Planning     Advance Care Planning Inpatient Note  [facility]    Today's Date: 6/25/2025  Unit: [facility] CV[facility]    Received request from IDT Member.  Upon review of chart and communication with care team, patient's decision making abilities are not in question.. Patient was/were present in the room during visit.    Goals of ACP Conversation:  Discuss advance care planning documents  Establish healthcare decision maker    Health Care Decision Makers:       Primary Decision Maker: emeli bush - Spouse - [phone]  Summary:  Completed New Documents    Advance Care Planning Documents (Patient Wishes):  Healthcare Power of /Advance Directive Appointment of Health Care Agent  Living Will/Advance Directive  Anatomical Gift/Organ Donation     Assessment:  Received request from IDT Member.  Upon review of chart and communication with care team, patient's decision making abilities are not in question.. Patient was/were present in the room during visit.  Explained state hierarchy of healthcare decision makers and reviewed advance medical directive document.  Assisted patient in completing advance medical directive.  Returned original and two copies to patient; placed a copy on patient's chart for scanning.      Interventions:  Provided education on documents for clarity and greater understanding  Discussed and provided education on state decision maker hierarchy  Assisted in the completion of documents according to patient's wishes at this time  Encouraged ongoing ACP conversation with future decision makers and loved ones    Care Preferences Communicated:   No    Outcomes/Plan:  ACP Discussion: Completed  New advance directive completed.  Returned original document(s) to patient, as well as copies for distribution to appointed agents  Placed a copy on patient's chart for scanning      Electronically signed by Chaplain BASIL MANN on 6/25/2025 at 12:34 PM

## 2025-06-26 ENCOUNTER — ANESTHESIA (OUTPATIENT)
Facility: HOSPITAL | Age: 50
End: 2025-06-26
Payer: COMMERCIAL

## 2025-06-26 ENCOUNTER — APPOINTMENT (OUTPATIENT)
Facility: HOSPITAL | Age: 50
DRG: 280 | End: 2025-06-26
Attending: STUDENT IN AN ORGANIZED HEALTH CARE EDUCATION/TRAINING PROGRAM
Payer: COMMERCIAL

## 2025-06-26 ENCOUNTER — HOSPITAL ENCOUNTER (OUTPATIENT)
Facility: HOSPITAL | Age: 50
Discharge: HOME | End: 2025-06-28
Attending: THORACIC SURGERY (CARDIOTHORACIC VASCULAR SURGERY)

## 2025-06-26 PROBLEM — Z95.1 S/P CABG X 4: Status: ACTIVE | Noted: 2025-06-26

## 2025-06-26 LAB
ABO + RH BLD: NORMAL
ACUTE KIDNEY INJURY RISK NEPHROCHECK: 0.4 (ref 0–0.3)
ALBUMIN SERPL-MCNC: 3.3 G/DL (ref 3.5–5)
ALBUMIN SERPL-MCNC: 3.8 G/DL (ref 3.5–5)
ALBUMIN/GLOB SERPL: 1.4 (ref 1.1–2.2)
ALBUMIN/GLOB SERPL: 1.4 (ref 1.1–2.2)
ALP SERPL-CCNC: 44 U/L (ref 45–117)
ALP SERPL-CCNC: 45 U/L (ref 45–117)
ALT SERPL-CCNC: 102 U/L (ref 12–78)
ALT SERPL-CCNC: 84 U/L (ref 12–78)
ANION GAP BLD CALC-SCNC: 10 (ref 10–20)
ANION GAP BLD CALC-SCNC: 11 (ref 10–20)
ANION GAP BLD CALC-SCNC: 11 (ref 10–20)
ANION GAP BLD CALC-SCNC: 12 (ref 10–20)
ANION GAP BLD CALC-SCNC: 12 (ref 10–20)
ANION GAP BLD CALC-SCNC: 5 (ref 10–20)
ANION GAP BLD CALC-SCNC: 9 (ref 10–20)
ANION GAP SERPL CALC-SCNC: 2 MMOL/L (ref 2–12)
ANION GAP SERPL CALC-SCNC: 3 MMOL/L (ref 2–12)
ANION GAP SERPL CALC-SCNC: 3 MMOL/L (ref 2–12)
APTT PPP: 25.7 SEC (ref 22.1–31)
ARTERIAL PATENCY WRIST A: ABNORMAL
ARTERIAL PATENCY WRIST A: ABNORMAL
AST SERPL-CCNC: 53 U/L (ref 15–37)
AST SERPL-CCNC: 76 U/L (ref 15–37)
BACTERIA SPEC CULT: NORMAL
BACTERIA SPEC CULT: NORMAL
BASE DEFICIT BLD-SCNC: 0.1 MMOL/L
BASE DEFICIT BLD-SCNC: 2.6 MMOL/L
BASE DEFICIT BLD-SCNC: 2.7 MMOL/L
BASE DEFICIT BLD-SCNC: 3.2 MMOL/L
BASE DEFICIT BLD-SCNC: 4.9 MMOL/L
BASE DEFICIT BLD-SCNC: 5.3 MMOL/L
BASE DEFICIT BLDA-SCNC: 3.9 MMOL/L
BASE DEFICIT BLDA-SCNC: 5.3 MMOL/L
BASE EXCESS BLD CALC-SCNC: 1.5 MMOL/L
BDY SITE: ABNORMAL
BDY SITE: ABNORMAL
BILIRUB SERPL-MCNC: 0.5 MG/DL (ref 0.2–1)
BILIRUB SERPL-MCNC: 0.8 MG/DL (ref 0.2–1)
BLD PROD TYP BPU: NORMAL
BLD PROD TYP BPU: NORMAL
BLOOD BANK DISPENSE STATUS: NORMAL
BLOOD BANK DISPENSE STATUS: NORMAL
BLOOD GROUP ANTIBODIES SERPL: NORMAL
BPU ID: NORMAL
BPU ID: NORMAL
BUN SERPL-MCNC: 15 MG/DL (ref 6–20)
BUN SERPL-MCNC: 16 MG/DL (ref 6–20)
BUN SERPL-MCNC: 16 MG/DL (ref 6–20)
BUN/CREAT SERPL: 14 (ref 12–20)
BUN/CREAT SERPL: 15 (ref 12–20)
BUN/CREAT SERPL: 17 (ref 12–20)
CA-I BLD-MCNC: 1.16 MMOL/L (ref 1.15–1.33)
CA-I BLD-MCNC: 1.17 MMOL/L (ref 1.15–1.33)
CA-I BLD-MCNC: 1.2 MMOL/L (ref 1.15–1.33)
CA-I BLD-MCNC: 1.22 MMOL/L (ref 1.15–1.33)
CA-I BLD-MCNC: 1.26 MMOL/L (ref 1.15–1.33)
CA-I BLD-MCNC: 1.29 MMOL/L (ref 1.15–1.33)
CA-I BLD-MCNC: 1.32 MMOL/L (ref 1.15–1.33)
CA-I BLD-SCNC: 1.17 MMOL/L (ref 1.13–1.32)
CALCIUM SERPL-MCNC: 7.9 MG/DL (ref 8.5–10.1)
CALCIUM SERPL-MCNC: 8.7 MG/DL (ref 8.5–10.1)
CALCIUM SERPL-MCNC: 9.1 MG/DL (ref 8.5–10.1)
CHLORIDE BLD-SCNC: 102 MMOL/L (ref 100–111)
CHLORIDE BLD-SCNC: 103 MMOL/L (ref 100–111)
CHLORIDE BLD-SCNC: 106 MMOL/L (ref 100–111)
CHLORIDE BLD-SCNC: 107 MMOL/L (ref 100–111)
CHLORIDE BLD-SCNC: 108 MMOL/L (ref 100–111)
CHLORIDE BLD-SCNC: 109 MMOL/L (ref 100–111)
CHLORIDE BLD-SCNC: 110 MMOL/L (ref 100–111)
CHLORIDE SERPL-SCNC: 108 MMOL/L (ref 97–108)
CHLORIDE SERPL-SCNC: 109 MMOL/L (ref 97–108)
CHLORIDE SERPL-SCNC: 110 MMOL/L (ref 97–108)
CO2 BLD-SCNC: 20 MMOL/L (ref 22–29)
CO2 BLD-SCNC: 22 MMOL/L (ref 22–29)
CO2 BLD-SCNC: 23 MMOL/L (ref 22–29)
CO2 BLD-SCNC: 25 MMOL/L (ref 22–29)
CO2 BLD-SCNC: 27 MMOL/L (ref 22–29)
CO2 SERPL-SCNC: 25 MMOL/L (ref 21–32)
CO2 SERPL-SCNC: 26 MMOL/L (ref 21–32)
CO2 SERPL-SCNC: 26 MMOL/L (ref 21–32)
CREAT SERPL-MCNC: 0.92 MG/DL (ref 0.7–1.3)
CREAT SERPL-MCNC: 1.07 MG/DL (ref 0.7–1.3)
CREAT SERPL-MCNC: 1.08 MG/DL (ref 0.7–1.3)
CREAT UR-MCNC: 0.9 MG/DL (ref 0.6–1.3)
CROSSMATCH RESULT: NORMAL
CROSSMATCH RESULT: NORMAL
ECHO BSA: 2.59 M2
ERYTHROCYTE [DISTWIDTH] IN BLOOD BY AUTOMATED COUNT: 13.1 % (ref 11.5–14.5)
ERYTHROCYTE [DISTWIDTH] IN BLOOD BY AUTOMATED COUNT: 13.2 % (ref 11.5–14.5)
FIO2 ON VENT: 100 %
FIO2 ON VENT: 40 %
GAS FLOW.O2 SETTING OXYMISER: 20
GLOBULIN SER CALC-MCNC: 2.3 G/DL (ref 2–4)
GLOBULIN SER CALC-MCNC: 2.8 G/DL (ref 2–4)
GLUCOSE BLD STRIP.AUTO-MCNC: 120 MG/DL (ref 65–117)
GLUCOSE BLD STRIP.AUTO-MCNC: 122 MG/DL (ref 65–117)
GLUCOSE BLD STRIP.AUTO-MCNC: 129 MG/DL (ref 65–117)
GLUCOSE BLD STRIP.AUTO-MCNC: 134 MG/DL (ref 65–117)
GLUCOSE BLD STRIP.AUTO-MCNC: 135 MG/DL (ref 74–99)
GLUCOSE BLD STRIP.AUTO-MCNC: 138 MG/DL (ref 65–117)
GLUCOSE BLD STRIP.AUTO-MCNC: 141 MG/DL (ref 65–117)
GLUCOSE BLD STRIP.AUTO-MCNC: 142 MG/DL (ref 65–117)
GLUCOSE BLD STRIP.AUTO-MCNC: 146 MG/DL (ref 65–117)
GLUCOSE BLD STRIP.AUTO-MCNC: 159 MG/DL (ref 65–117)
GLUCOSE BLD STRIP.AUTO-MCNC: 161 MG/DL (ref 65–117)
GLUCOSE BLD STRIP.AUTO-MCNC: 164 MG/DL (ref 65–117)
GLUCOSE BLD STRIP.AUTO-MCNC: 164 MG/DL (ref 74–99)
GLUCOSE BLD STRIP.AUTO-MCNC: 173 MG/DL (ref 74–99)
GLUCOSE BLD STRIP.AUTO-MCNC: 181 MG/DL (ref 74–99)
GLUCOSE BLD STRIP.AUTO-MCNC: 188 MG/DL (ref 74–99)
GLUCOSE BLD STRIP.AUTO-MCNC: 196 MG/DL (ref 74–99)
GLUCOSE BLD STRIP.AUTO-MCNC: 89 MG/DL (ref 65–117)
GLUCOSE BLD STRIP.AUTO-MCNC: 98 MG/DL (ref 74–99)
GLUCOSE SERPL-MCNC: 126 MG/DL (ref 65–100)
GLUCOSE SERPL-MCNC: 158 MG/DL (ref 65–100)
GLUCOSE SERPL-MCNC: 163 MG/DL (ref 65–100)
HCO3 BLDA-SCNC: 20 MMOL/L (ref 22–26)
HCO3 BLDA-SCNC: 21 MMOL/L
HCO3 BLDA-SCNC: 22 MMOL/L
HCO3 BLDA-SCNC: 23 MMOL/L
HCO3 BLDA-SCNC: 23 MMOL/L
HCO3 BLDA-SCNC: 24 MMOL/L
HCO3 BLDA-SCNC: 24 MMOL/L (ref 22–26)
HCO3 BLDA-SCNC: 26 MMOL/L
HCO3 BLDA-SCNC: 28 MMOL/L
HCT VFR BLD AUTO: 36.8 % (ref 36.6–50.3)
HCT VFR BLD AUTO: 39 % (ref 36.6–50.3)
HGB BLD-MCNC: 10.5 G/DL (ref 12.1–17)
HGB BLD-MCNC: 11.1 G/DL (ref 12.1–17)
HGB BLD-MCNC: 11.6 G/DL (ref 12.1–17)
HGB BLD-MCNC: 12.3 G/DL (ref 12.1–17)
HGB BLD-MCNC: 12.5 G/DL (ref 12.1–17)
HGB BLD-MCNC: 12.8 G/DL (ref 12.1–17)
HGB BLD-MCNC: 13 G/DL (ref 12.1–17)
HGB BLD-MCNC: 13.3 G/DL (ref 12.1–17)
INR PPP: 1.2 (ref 0.9–1.1)
LACTATE BLD-SCNC: 1.39 MMOL/L (ref 0.4–2)
LACTATE BLD-SCNC: 1.4 MMOL/L (ref 0.4–2)
LACTATE BLD-SCNC: 2.05 MMOL/L (ref 0.4–2)
LACTATE BLD-SCNC: 2.32 MMOL/L (ref 0.4–2)
LACTATE BLD-SCNC: <0.3 MMOL/L (ref 0.4–2)
Lab: NORMAL
Lab: NORMAL
MAGNESIUM SERPL-MCNC: 2.8 MG/DL (ref 1.6–2.4)
MCH RBC QN AUTO: 29.9 PG (ref 26–34)
MCH RBC QN AUTO: 30 PG (ref 26–34)
MCHC RBC AUTO-ENTMCNC: 33.3 G/DL (ref 30–36.5)
MCHC RBC AUTO-ENTMCNC: 33.4 G/DL (ref 30–36.5)
MCV RBC AUTO: 89.5 FL (ref 80–99)
MCV RBC AUTO: 90.1 FL (ref 80–99)
NRBC # BLD: 0 K/UL (ref 0–0.01)
NRBC # BLD: 0 K/UL (ref 0–0.01)
NRBC BLD-RTO: 0 PER 100 WBC
NRBC BLD-RTO: 0 PER 100 WBC
PCO2 BLD: 41.5 MMHG (ref 35–48)
PCO2 BLD: 43.9 MMHG (ref 35–48)
PCO2 BLD: 44.8 MMHG (ref 35–48)
PCO2 BLD: 45.5 MMHG (ref 35–48)
PCO2 BLD: 48 MMHG (ref 35–48)
PCO2 BLD: 49.5 MMHG (ref 35–48)
PCO2 BLDA: 39 MMHG (ref 35–45)
PCO2 BLDA: 53 MMHG (ref 35–45)
PCO2 BLDV: 52 MMHG (ref 41–51)
PEEP RESPIRATORY: 5
PEEP RESPIRATORY: 6
PH BLD: 7.27 (ref 7.35–7.45)
PH BLD: 7.29 (ref 7.35–7.45)
PH BLD: 7.3 (ref 7.35–7.45)
PH BLD: 7.32 (ref 7.35–7.45)
PH BLD: 7.35 (ref 7.35–7.45)
PH BLD: 7.36 (ref 7.35–7.45)
PH BLDA: 7.27 (ref 7.35–7.45)
PH BLDA: 7.33 (ref 7.35–7.45)
PH BLDV: 7.34 (ref 7.32–7.42)
PLATELET # BLD AUTO: 166 K/UL (ref 150–400)
PLATELET # BLD AUTO: 181 K/UL (ref 150–400)
PMV BLD AUTO: 11.2 FL (ref 8.9–12.9)
PMV BLD AUTO: 11.2 FL (ref 8.9–12.9)
PO2 BLD: 252 MMHG (ref 83–108)
PO2 BLD: 290 MMHG (ref 83–108)
PO2 BLD: 293 MMHG (ref 83–108)
PO2 BLD: 419 MMHG (ref 83–108)
PO2 BLD: 482 MMHG (ref 83–108)
PO2 BLD: 487 MMHG (ref 83–108)
PO2 BLDA: 225 MMHG (ref 80–100)
PO2 BLDA: 83 MMHG (ref 80–100)
PO2 BLDV: 45 MMHG (ref 25–40)
POTASSIUM BLD-SCNC: 4 MMOL/L (ref 3.5–5.5)
POTASSIUM BLD-SCNC: 4.5 MMOL/L (ref 3.5–5.5)
POTASSIUM BLD-SCNC: 4.7 MMOL/L (ref 3.5–5.5)
POTASSIUM BLD-SCNC: 4.8 MMOL/L (ref 3.5–5.5)
POTASSIUM BLD-SCNC: 5 MMOL/L (ref 3.5–5.5)
POTASSIUM BLD-SCNC: 5.3 MMOL/L (ref 3.5–5.5)
POTASSIUM BLD-SCNC: 5.9 MMOL/L (ref 3.5–5.5)
POTASSIUM SERPL-SCNC: 3.7 MMOL/L (ref 3.5–5.1)
POTASSIUM SERPL-SCNC: 4.7 MMOL/L (ref 3.5–5.1)
POTASSIUM SERPL-SCNC: 4.9 MMOL/L (ref 3.5–5.1)
PRESSURE SUPPORT SETTING VENT: 5
PRESSURE SUPPORT SETTING VENT: 5
PROCALCITONIN SERPL-MCNC: <0.05 NG/ML
PROT SERPL-MCNC: 5.6 G/DL (ref 6.4–8.2)
PROT SERPL-MCNC: 6.6 G/DL (ref 6.4–8.2)
PROTHROMBIN TIME: 12.3 SEC (ref 9.2–11.2)
RBC # BLD AUTO: 4.11 M/UL (ref 4.1–5.7)
RBC # BLD AUTO: 4.33 M/UL (ref 4.1–5.7)
SAO2 % BLD: 100 % (ref 94–98)
SAO2 % BLD: 77 % (ref 94–98)
SAO2 % BLD: 95 % (ref 92–97)
SAO2 % BLD: 99 % (ref 92–97)
SAO2% DEVICE SAO2% SENSOR NAME: ABNORMAL
SAO2% DEVICE SAO2% SENSOR NAME: ABNORMAL
SERVICE CMNT-IMP: ABNORMAL
SERVICE CMNT-IMP: NORMAL
SODIUM BLD-SCNC: 135 MMOL/L (ref 136–145)
SODIUM BLD-SCNC: 138 MMOL/L (ref 136–145)
SODIUM BLD-SCNC: 139 MMOL/L (ref 136–145)
SODIUM BLD-SCNC: 140 MMOL/L (ref 136–145)
SODIUM BLD-SCNC: 140 MMOL/L (ref 136–145)
SODIUM BLD-SCNC: 142 MMOL/L (ref 136–145)
SODIUM BLD-SCNC: 142 MMOL/L (ref 136–145)
SODIUM SERPL-SCNC: 136 MMOL/L (ref 136–145)
SODIUM SERPL-SCNC: 137 MMOL/L (ref 136–145)
SODIUM SERPL-SCNC: 139 MMOL/L (ref 136–145)
SPECIMEN EXP DATE BLD: NORMAL
SPECIMEN SITE: ABNORMAL
THERAPEUTIC RANGE: NORMAL SECS (ref 58–77)
UNIT DIVISION: 0
UNIT DIVISION: 0
VENTILATION MODE VENT: ABNORMAL
VENTILATION MODE VENT: ABNORMAL
VT SETTING VENT: 600
WBC # BLD AUTO: 29.6 K/UL (ref 4.1–11.1)
WBC # BLD AUTO: 35.1 K/UL (ref 4.1–11.1)

## 2025-06-26 PROCEDURE — 2500000003 HC RX 250 WO HCPCS: Performed by: PHYSICIAN ASSISTANT

## 2025-06-26 PROCEDURE — 85027 COMPLETE CBC AUTOMATED: CPT

## 2025-06-26 PROCEDURE — C1713 ANCHOR/SCREW BN/BN,TIS/BN: HCPCS | Performed by: THORACIC SURGERY (CARDIOTHORACIC VASCULAR SURGERY)

## 2025-06-26 PROCEDURE — 84145 PROCALCITONIN (PCT): CPT

## 2025-06-26 PROCEDURE — 2580000003 HC RX 258: Performed by: NURSE ANESTHETIST, CERTIFIED REGISTERED

## 2025-06-26 PROCEDURE — 6370000000 HC RX 637 (ALT 250 FOR IP): Performed by: NURSE PRACTITIONER

## 2025-06-26 PROCEDURE — 84295 ASSAY OF SERUM SODIUM: CPT

## 2025-06-26 PROCEDURE — A4648 IMPLANTABLE TISSUE MARKER: HCPCS | Performed by: THORACIC SURGERY (CARDIOTHORACIC VASCULAR SURGERY)

## 2025-06-26 PROCEDURE — 6360000002 HC RX W HCPCS: Performed by: PHYSICIAN ASSISTANT

## 2025-06-26 PROCEDURE — 2000000000 HC ICU R&B

## 2025-06-26 PROCEDURE — P9045 ALBUMIN (HUMAN), 5%, 250 ML: HCPCS | Performed by: PHYSICIAN ASSISTANT

## 2025-06-26 PROCEDURE — 37799 UNLISTED PX VASCULAR SURGERY: CPT

## 2025-06-26 PROCEDURE — 2500000003 HC RX 250 WO HCPCS: Performed by: NURSE ANESTHETIST, CERTIFIED REGISTERED

## 2025-06-26 PROCEDURE — 3700000001 HC ADD 15 MINUTES (ANESTHESIA): Performed by: THORACIC SURGERY (CARDIOTHORACIC VASCULAR SURGERY)

## 2025-06-26 PROCEDURE — 2500000003 HC RX 250 WO HCPCS: Performed by: THORACIC SURGERY (CARDIOTHORACIC VASCULAR SURGERY)

## 2025-06-26 PROCEDURE — 84132 ASSAY OF SERUM POTASSIUM: CPT

## 2025-06-26 PROCEDURE — 2700000000 HC OXYGEN THERAPY PER DAY

## 2025-06-26 PROCEDURE — 6360000002 HC RX W HCPCS: Performed by: NURSE ANESTHETIST, CERTIFIED REGISTERED

## 2025-06-26 PROCEDURE — 6370000000 HC RX 637 (ALT 250 FOR IP): Performed by: NURSE ANESTHETIST, CERTIFIED REGISTERED

## 2025-06-26 PROCEDURE — 6370000000 HC RX 637 (ALT 250 FOR IP): Performed by: PHYSICIAN ASSISTANT

## 2025-06-26 PROCEDURE — 5A09457 ASSISTANCE WITH RESPIRATORY VENTILATION, 24-96 CONSECUTIVE HOURS, CONTINUOUS POSITIVE AIRWAY PRESSURE: ICD-10-PCS | Performed by: THORACIC SURGERY (CARDIOTHORACIC VASCULAR SURGERY)

## 2025-06-26 PROCEDURE — 85018 HEMOGLOBIN: CPT

## 2025-06-26 PROCEDURE — 83735 ASSAY OF MAGNESIUM: CPT

## 2025-06-26 PROCEDURE — 2720000010 HC SURG SUPPLY STERILE: Performed by: THORACIC SURGERY (CARDIOTHORACIC VASCULAR SURGERY)

## 2025-06-26 PROCEDURE — 85730 THROMBOPLASTIN TIME PARTIAL: CPT

## 2025-06-26 PROCEDURE — 6370000000 HC RX 637 (ALT 250 FOR IP)

## 2025-06-26 PROCEDURE — 6360000002 HC RX W HCPCS: Performed by: THORACIC SURGERY (CARDIOTHORACIC VASCULAR SURGERY)

## 2025-06-26 PROCEDURE — P9045 ALBUMIN (HUMAN), 5%, 250 ML: HCPCS | Performed by: NURSE ANESTHETIST, CERTIFIED REGISTERED

## 2025-06-26 PROCEDURE — 80053 COMPREHEN METABOLIC PANEL: CPT

## 2025-06-26 PROCEDURE — 82947 ASSAY GLUCOSE BLOOD QUANT: CPT

## 2025-06-26 PROCEDURE — 3600000008 HC SURGERY OHS BASE: Performed by: THORACIC SURGERY (CARDIOTHORACIC VASCULAR SURGERY)

## 2025-06-26 PROCEDURE — 2709999900 HC NON-CHARGEABLE SUPPLY: Performed by: THORACIC SURGERY (CARDIOTHORACIC VASCULAR SURGERY)

## 2025-06-26 PROCEDURE — 82803 BLOOD GASES ANY COMBINATION: CPT

## 2025-06-26 PROCEDURE — 3600000018 HC SURGERY OHS ADDTL 15MIN: Performed by: THORACIC SURGERY (CARDIOTHORACIC VASCULAR SURGERY)

## 2025-06-26 PROCEDURE — 82962 GLUCOSE BLOOD TEST: CPT

## 2025-06-26 PROCEDURE — 71045 X-RAY EXAM CHEST 1 VIEW: CPT

## 2025-06-26 PROCEDURE — C1729 CATH, DRAINAGE: HCPCS | Performed by: THORACIC SURGERY (CARDIOTHORACIC VASCULAR SURGERY)

## 2025-06-26 PROCEDURE — 85610 PROTHROMBIN TIME: CPT

## 2025-06-26 PROCEDURE — 36415 COLL VENOUS BLD VENIPUNCTURE: CPT

## 2025-06-26 PROCEDURE — 3700000000 HC ANESTHESIA ATTENDED CARE: Performed by: THORACIC SURGERY (CARDIOTHORACIC VASCULAR SURGERY)

## 2025-06-26 PROCEDURE — 2580000003 HC RX 258: Performed by: PHYSICIAN ASSISTANT

## 2025-06-26 PROCEDURE — 94002 VENT MGMT INPAT INIT DAY: CPT

## 2025-06-26 PROCEDURE — 6360000002 HC RX W HCPCS

## 2025-06-26 PROCEDURE — 82330 ASSAY OF CALCIUM: CPT

## 2025-06-26 RX ORDER — SODIUM CHLORIDE 9 MG/ML
INJECTION, SOLUTION INTRAVENOUS
Status: DISCONTINUED | OUTPATIENT
Start: 2025-06-26 | End: 2025-06-26 | Stop reason: SDUPTHER

## 2025-06-26 RX ORDER — PAPAVERINE HYDROCHLORIDE 30 MG/ML
INJECTION INTRAMUSCULAR; INTRAVENOUS PRN
Status: DISCONTINUED | OUTPATIENT
Start: 2025-06-26 | End: 2025-06-26 | Stop reason: ALTCHOICE

## 2025-06-26 RX ORDER — POTASSIUM CHLORIDE 29.8 MG/ML
20 INJECTION INTRAVENOUS PRN
Status: DISCONTINUED | OUTPATIENT
Start: 2025-06-26 | End: 2025-06-27

## 2025-06-26 RX ORDER — SODIUM CHLORIDE 0.9 % (FLUSH) 0.9 %
5-40 SYRINGE (ML) INJECTION EVERY 12 HOURS SCHEDULED
Status: DISCONTINUED | OUTPATIENT
Start: 2025-06-26 | End: 2025-06-30 | Stop reason: HOSPADM

## 2025-06-26 RX ORDER — PROTAMINE SULFATE 10 MG/ML
INJECTION, SOLUTION INTRAVENOUS
Status: DISCONTINUED | OUTPATIENT
Start: 2025-06-26 | End: 2025-06-26 | Stop reason: SDUPTHER

## 2025-06-26 RX ORDER — ONDANSETRON 2 MG/ML
INJECTION INTRAMUSCULAR; INTRAVENOUS
Status: DISCONTINUED | OUTPATIENT
Start: 2025-06-26 | End: 2025-06-26 | Stop reason: SDUPTHER

## 2025-06-26 RX ORDER — INSULIN LISPRO 100 [IU]/ML
0-12 INJECTION, SOLUTION INTRAVENOUS; SUBCUTANEOUS
Status: DISCONTINUED | OUTPATIENT
Start: 2025-06-26 | End: 2025-06-30

## 2025-06-26 RX ORDER — DESMOPRESSIN ACETATE 4 UG/ML
INJECTION, SOLUTION INTRAVENOUS; SUBCUTANEOUS
Status: DISCONTINUED | OUTPATIENT
Start: 2025-06-26 | End: 2025-06-26 | Stop reason: SDUPTHER

## 2025-06-26 RX ORDER — FENTANYL CITRATE 50 UG/ML
INJECTION, SOLUTION INTRAMUSCULAR; INTRAVENOUS
Status: DISCONTINUED | OUTPATIENT
Start: 2025-06-26 | End: 2025-06-26 | Stop reason: SDUPTHER

## 2025-06-26 RX ORDER — IPRATROPIUM BROMIDE AND ALBUTEROL SULFATE 2.5; .5 MG/3ML; MG/3ML
1 SOLUTION RESPIRATORY (INHALATION) EVERY 4 HOURS PRN
Status: DISCONTINUED | OUTPATIENT
Start: 2025-06-26 | End: 2025-06-30 | Stop reason: HOSPADM

## 2025-06-26 RX ORDER — DEXTROSE MONOHYDRATE 100 MG/ML
INJECTION, SOLUTION INTRAVENOUS CONTINUOUS PRN
Status: DISCONTINUED | OUTPATIENT
Start: 2025-06-26 | End: 2025-06-30 | Stop reason: HOSPADM

## 2025-06-26 RX ORDER — MUPIROCIN 2 %
OINTMENT (GRAM) TOPICAL 2 TIMES DAILY
Status: DISCONTINUED | OUTPATIENT
Start: 2025-06-26 | End: 2025-06-30 | Stop reason: HOSPADM

## 2025-06-26 RX ORDER — SODIUM CHLORIDE 9 MG/ML
INJECTION, SOLUTION INTRAVENOUS CONTINUOUS
Status: DISCONTINUED | OUTPATIENT
Start: 2025-06-26 | End: 2025-06-30 | Stop reason: HOSPADM

## 2025-06-26 RX ORDER — PROPOFOL 10 MG/ML
INJECTION, EMULSION INTRAVENOUS
Status: DISCONTINUED | OUTPATIENT
Start: 2025-06-26 | End: 2025-06-26 | Stop reason: SDUPTHER

## 2025-06-26 RX ORDER — ONDANSETRON 2 MG/ML
4 INJECTION INTRAMUSCULAR; INTRAVENOUS EVERY 4 HOURS PRN
Status: DISCONTINUED | OUTPATIENT
Start: 2025-06-26 | End: 2025-06-30 | Stop reason: HOSPADM

## 2025-06-26 RX ORDER — CEFAZOLIN SODIUM 1 G/3ML
INJECTION, POWDER, FOR SOLUTION INTRAMUSCULAR; INTRAVENOUS PRN
Status: DISCONTINUED | OUTPATIENT
Start: 2025-06-26 | End: 2025-06-26 | Stop reason: ALTCHOICE

## 2025-06-26 RX ORDER — BUPIVACAINE HYDROCHLORIDE 2.5 MG/ML
INJECTION, SOLUTION EPIDURAL; INFILTRATION; INTRACAUDAL; PERINEURAL PRN
Status: DISCONTINUED | OUTPATIENT
Start: 2025-06-26 | End: 2025-06-26 | Stop reason: ALTCHOICE

## 2025-06-26 RX ORDER — LANOLIN ALCOHOL/MO/W.PET/CERES
400 CREAM (GRAM) TOPICAL 2 TIMES DAILY
Status: DISCONTINUED | OUTPATIENT
Start: 2025-06-27 | End: 2025-06-30 | Stop reason: HOSPADM

## 2025-06-26 RX ORDER — DEXMEDETOMIDINE HYDROCHLORIDE 4 UG/ML
.1-1.5 INJECTION, SOLUTION INTRAVENOUS CONTINUOUS
Status: DISCONTINUED | OUTPATIENT
Start: 2025-06-26 | End: 2025-06-27

## 2025-06-26 RX ORDER — DIPHENHYDRAMINE HCL 25 MG
25 CAPSULE ORAL NIGHTLY PRN
Status: DISCONTINUED | OUTPATIENT
Start: 2025-06-27 | End: 2025-06-26

## 2025-06-26 RX ORDER — MIDAZOLAM HYDROCHLORIDE 2 MG/2ML
1 INJECTION, SOLUTION INTRAMUSCULAR; INTRAVENOUS
Status: DISCONTINUED | OUTPATIENT
Start: 2025-06-26 | End: 2025-06-27

## 2025-06-26 RX ORDER — ROCURONIUM BROMIDE 10 MG/ML
INJECTION, SOLUTION INTRAVENOUS
Status: DISCONTINUED | OUTPATIENT
Start: 2025-06-26 | End: 2025-06-26 | Stop reason: SDUPTHER

## 2025-06-26 RX ORDER — ACETAMINOPHEN 500 MG
1000 TABLET ORAL EVERY 6 HOURS
Status: DISCONTINUED | OUTPATIENT
Start: 2025-06-26 | End: 2025-06-30 | Stop reason: HOSPADM

## 2025-06-26 RX ORDER — SODIUM CHLORIDE, SODIUM LACTATE, POTASSIUM CHLORIDE, CALCIUM CHLORIDE 600; 310; 30; 20 MG/100ML; MG/100ML; MG/100ML; MG/100ML
INJECTION, SOLUTION INTRAVENOUS
Status: DISCONTINUED | OUTPATIENT
Start: 2025-06-26 | End: 2025-06-26 | Stop reason: SDUPTHER

## 2025-06-26 RX ORDER — MIDAZOLAM HYDROCHLORIDE 1 MG/ML
INJECTION, SOLUTION INTRAMUSCULAR; INTRAVENOUS
Status: DISCONTINUED | OUTPATIENT
Start: 2025-06-26 | End: 2025-06-26 | Stop reason: SDUPTHER

## 2025-06-26 RX ORDER — TRANEXAMIC ACID 100 MG/ML
INJECTION, SOLUTION INTRAVENOUS
Status: DISCONTINUED | OUTPATIENT
Start: 2025-06-26 | End: 2025-06-26 | Stop reason: SDUPTHER

## 2025-06-26 RX ORDER — INSULIN LISPRO 100 [IU]/ML
0-6 INJECTION, SOLUTION INTRAVENOUS; SUBCUTANEOUS NIGHTLY
Status: DISCONTINUED | OUTPATIENT
Start: 2025-06-26 | End: 2025-06-30

## 2025-06-26 RX ORDER — SODIUM CHLORIDE 0.9 % (FLUSH) 0.9 %
5-40 SYRINGE (ML) INJECTION PRN
Status: DISCONTINUED | OUTPATIENT
Start: 2025-06-26 | End: 2025-06-30 | Stop reason: HOSPADM

## 2025-06-26 RX ORDER — BISACODYL 10 MG
10 SUPPOSITORY, RECTAL RECTAL DAILY PRN
Status: DISCONTINUED | OUTPATIENT
Start: 2025-06-26 | End: 2025-06-30 | Stop reason: HOSPADM

## 2025-06-26 RX ORDER — HEPARIN SODIUM 1000 [USP'U]/ML
INJECTION, SOLUTION INTRAVENOUS; SUBCUTANEOUS
Status: DISCONTINUED | OUTPATIENT
Start: 2025-06-26 | End: 2025-06-26 | Stop reason: SDUPTHER

## 2025-06-26 RX ORDER — ONDANSETRON 2 MG/ML
4 INJECTION INTRAMUSCULAR; INTRAVENOUS ONCE
Status: COMPLETED | OUTPATIENT
Start: 2025-06-26 | End: 2025-06-26

## 2025-06-26 RX ORDER — DEXAMETHASONE SODIUM PHOSPHATE 4 MG/ML
INJECTION, SOLUTION INTRA-ARTICULAR; INTRALESIONAL; INTRAMUSCULAR; INTRAVENOUS; SOFT TISSUE
Status: DISCONTINUED | OUTPATIENT
Start: 2025-06-26 | End: 2025-06-26 | Stop reason: SDUPTHER

## 2025-06-26 RX ORDER — LIDOCAINE HYDROCHLORIDE 20 MG/ML
INJECTION, SOLUTION EPIDURAL; INFILTRATION; INTRACAUDAL; PERINEURAL
Status: DISCONTINUED | OUTPATIENT
Start: 2025-06-26 | End: 2025-06-26 | Stop reason: SDUPTHER

## 2025-06-26 RX ORDER — SENNA AND DOCUSATE SODIUM 50; 8.6 MG/1; MG/1
1 TABLET, FILM COATED ORAL 2 TIMES DAILY
Status: DISCONTINUED | OUTPATIENT
Start: 2025-06-26 | End: 2025-06-28

## 2025-06-26 RX ORDER — NITROGLYCERIN 20 MG/100ML
INJECTION INTRAVENOUS
Status: DISCONTINUED | OUTPATIENT
Start: 2025-06-26 | End: 2025-06-26 | Stop reason: SDUPTHER

## 2025-06-26 RX ORDER — CHLORHEXIDINE GLUCONATE ORAL RINSE 1.2 MG/ML
15 SOLUTION DENTAL 2 TIMES DAILY
Status: DISCONTINUED | OUTPATIENT
Start: 2025-06-26 | End: 2025-06-30 | Stop reason: HOSPADM

## 2025-06-26 RX ORDER — ALBUMIN HUMAN 50 G/1000ML
12.5 SOLUTION INTRAVENOUS PRN
Status: DISCONTINUED | OUTPATIENT
Start: 2025-06-26 | End: 2025-06-30 | Stop reason: HOSPADM

## 2025-06-26 RX ORDER — LIDOCAINE 4 G/G
2 PATCH TOPICAL DAILY
Status: DISCONTINUED | OUTPATIENT
Start: 2025-06-26 | End: 2025-06-30 | Stop reason: HOSPADM

## 2025-06-26 RX ORDER — FAMOTIDINE 20 MG/1
20 TABLET, FILM COATED ORAL 2 TIMES DAILY
Status: DISCONTINUED | OUTPATIENT
Start: 2025-06-26 | End: 2025-06-27

## 2025-06-26 RX ORDER — SODIUM CHLORIDE 450 MG/100ML
INJECTION, SOLUTION INTRAVENOUS CONTINUOUS
Status: DISCONTINUED | OUTPATIENT
Start: 2025-06-26 | End: 2025-06-30 | Stop reason: HOSPADM

## 2025-06-26 RX ORDER — GLUCAGON 1 MG/ML
1 KIT INJECTION PRN
Status: DISCONTINUED | OUTPATIENT
Start: 2025-06-26 | End: 2025-06-30 | Stop reason: HOSPADM

## 2025-06-26 RX ORDER — INDOMETHACIN 25 MG/1
CAPSULE ORAL
Status: DISCONTINUED | OUTPATIENT
Start: 2025-06-26 | End: 2025-06-26 | Stop reason: SDUPTHER

## 2025-06-26 RX ORDER — GABAPENTIN 100 MG/1
200 CAPSULE ORAL 3 TIMES DAILY
Status: DISCONTINUED | OUTPATIENT
Start: 2025-06-26 | End: 2025-06-29

## 2025-06-26 RX ORDER — METHOCARBAMOL 750 MG/1
750 TABLET, FILM COATED ORAL 4 TIMES DAILY
Status: DISCONTINUED | OUTPATIENT
Start: 2025-06-26 | End: 2025-06-30 | Stop reason: HOSPADM

## 2025-06-26 RX ORDER — OXYCODONE HYDROCHLORIDE 5 MG/1
5 TABLET ORAL EVERY 4 HOURS PRN
Status: DISCONTINUED | OUTPATIENT
Start: 2025-06-26 | End: 2025-06-30 | Stop reason: HOSPADM

## 2025-06-26 RX ORDER — ALBUMIN HUMAN 50 G/1000ML
SOLUTION INTRAVENOUS
Status: DISCONTINUED | OUTPATIENT
Start: 2025-06-26 | End: 2025-06-26 | Stop reason: SDUPTHER

## 2025-06-26 RX ORDER — POLYETHYLENE GLYCOL 3350 17 G/17G
17 POWDER, FOR SOLUTION ORAL DAILY
Status: DISCONTINUED | OUTPATIENT
Start: 2025-06-26 | End: 2025-06-29

## 2025-06-26 RX ORDER — SUCCINYLCHOLINE CHLORIDE 20 MG/ML
INJECTION INTRAMUSCULAR; INTRAVENOUS
Status: DISCONTINUED | OUTPATIENT
Start: 2025-06-26 | End: 2025-06-26 | Stop reason: SDUPTHER

## 2025-06-26 RX ORDER — AMIODARONE HYDROCHLORIDE 200 MG/1
400 TABLET ORAL 2 TIMES DAILY
Status: DISCONTINUED | OUTPATIENT
Start: 2025-06-27 | End: 2025-06-30 | Stop reason: HOSPADM

## 2025-06-26 RX ORDER — MAGNESIUM SULFATE HEPTAHYDRATE 40 MG/ML
INJECTION, SOLUTION INTRAVENOUS
Status: DISCONTINUED | OUTPATIENT
Start: 2025-06-26 | End: 2025-06-26 | Stop reason: SDUPTHER

## 2025-06-26 RX ORDER — MAGNESIUM SULFATE IN WATER 40 MG/ML
2000 INJECTION, SOLUTION INTRAVENOUS PRN
Status: DISCONTINUED | OUTPATIENT
Start: 2025-06-26 | End: 2025-06-30 | Stop reason: HOSPADM

## 2025-06-26 RX ORDER — GABAPENTIN 300 MG/1
300 CAPSULE ORAL ONCE
Status: COMPLETED | OUTPATIENT
Start: 2025-06-26 | End: 2025-06-26

## 2025-06-26 RX ORDER — OXYCODONE HYDROCHLORIDE 5 MG/1
10 TABLET ORAL EVERY 4 HOURS PRN
Status: DISCONTINUED | OUTPATIENT
Start: 2025-06-26 | End: 2025-06-30 | Stop reason: HOSPADM

## 2025-06-26 RX ORDER — HYDRALAZINE HYDROCHLORIDE 20 MG/ML
10 INJECTION INTRAMUSCULAR; INTRAVENOUS EVERY 6 HOURS PRN
Status: DISCONTINUED | OUTPATIENT
Start: 2025-06-26 | End: 2025-06-27

## 2025-06-26 RX ADMIN — ROCURONIUM BROMIDE 70 MG: 10 INJECTION INTRAVENOUS at 07:46

## 2025-06-26 RX ADMIN — MIDAZOLAM 3 MG: 1 INJECTION INTRAMUSCULAR; INTRAVENOUS at 07:10

## 2025-06-26 RX ADMIN — WATER 1000 MG: 1 INJECTION INTRAMUSCULAR; INTRAVENOUS; SUBCUTANEOUS at 13:31

## 2025-06-26 RX ADMIN — SODIUM BICARBONATE 50 MEQ: 84 INJECTION, SOLUTION INTRAVENOUS at 11:07

## 2025-06-26 RX ADMIN — GABAPENTIN 200 MG: 100 CAPSULE ORAL at 20:36

## 2025-06-26 RX ADMIN — ACETAMINOPHEN 1000 MG: 500 TABLET, FILM COATED ORAL at 23:07

## 2025-06-26 RX ADMIN — METHOCARBAMOL TABLETS 750 MG: 750 TABLET, COATED ORAL at 20:36

## 2025-06-26 RX ADMIN — MAGNESIUM SULFATE HEPTAHYDRATE 2000 MG: 40 INJECTION, SOLUTION INTRAVENOUS at 10:35

## 2025-06-26 RX ADMIN — SUCCINYLCHOLINE CHLORIDE 200 MG: 20 INJECTION, SOLUTION INTRAMUSCULAR; INTRAVENOUS at 07:46

## 2025-06-26 RX ADMIN — NITROGLYCERIN 40 MCG: 20 INJECTION INTRAVENOUS at 08:09

## 2025-06-26 RX ADMIN — 0.12% CHLORHEXIDINE GLUCONATE 15 ML: 1.2 RINSE ORAL at 20:38

## 2025-06-26 RX ADMIN — DEXAMETHASONE SODIUM PHOSPHATE 8 MG: 4 INJECTION INTRA-ARTICULAR; INTRALESIONAL; INTRAMUSCULAR; INTRAVENOUS; SOFT TISSUE at 07:51

## 2025-06-26 RX ADMIN — ROCURONIUM BROMIDE 50 MG: 10 INJECTION INTRAVENOUS at 09:50

## 2025-06-26 RX ADMIN — PROPOFOL 50 MG: 10 INJECTION, EMULSION INTRAVENOUS at 08:42

## 2025-06-26 RX ADMIN — CALCIUM CHLORIDE 1000 MG: 100 INJECTION, SOLUTION INTRAVENOUS at 12:25

## 2025-06-26 RX ADMIN — ALBUMIN (HUMAN) 12.5 G: 12.5 INJECTION, SOLUTION INTRAVENOUS at 12:20

## 2025-06-26 RX ADMIN — DESMOPRESSIN ACETATE 40 MCG: 4 INJECTION, SOLUTION INTRAVENOUS; SUBCUTANEOUS at 10:11

## 2025-06-26 RX ADMIN — FENTANYL CITRATE 150 MCG: 50 INJECTION INTRAMUSCULAR; INTRAVENOUS at 07:47

## 2025-06-26 RX ADMIN — HYDROMORPHONE HYDROCHLORIDE 0.5 MG: 1 INJECTION, SOLUTION INTRAMUSCULAR; INTRAVENOUS; SUBCUTANEOUS at 23:07

## 2025-06-26 RX ADMIN — ONDANSETRON 4 MG: 2 INJECTION, SOLUTION INTRAMUSCULAR; INTRAVENOUS at 13:21

## 2025-06-26 RX ADMIN — PROPOFOL 50 MG: 10 INJECTION, EMULSION INTRAVENOUS at 08:13

## 2025-06-26 RX ADMIN — ALBUMIN (HUMAN) 12.5 G: 12.5 INJECTION, SOLUTION INTRAVENOUS at 10:49

## 2025-06-26 RX ADMIN — HYDROMORPHONE HYDROCHLORIDE 0.5 MG: 1 INJECTION, SOLUTION INTRAMUSCULAR; INTRAVENOUS; SUBCUTANEOUS at 16:44

## 2025-06-26 RX ADMIN — HYDROMORPHONE HYDROCHLORIDE 0.5 MG: 1 INJECTION, SOLUTION INTRAMUSCULAR; INTRAVENOUS; SUBCUTANEOUS at 12:45

## 2025-06-26 RX ADMIN — ALPRAZOLAM 0.25 MG: 0.25 TABLET ORAL at 00:43

## 2025-06-26 RX ADMIN — MIDAZOLAM 2 MG: 1 INJECTION INTRAMUSCULAR; INTRAVENOUS at 09:41

## 2025-06-26 RX ADMIN — DOCUSATE SODIUM 50MG AND SENNOSIDES 8.6MG 1 TABLET: 8.6; 5 TABLET, FILM COATED ORAL at 20:36

## 2025-06-26 RX ADMIN — MUPIROCIN: 20 OINTMENT TOPICAL at 20:39

## 2025-06-26 RX ADMIN — ROCURONIUM BROMIDE 80 MG: 10 INJECTION INTRAVENOUS at 08:42

## 2025-06-26 RX ADMIN — PROPOFOL 50 MG: 10 INJECTION, EMULSION INTRAVENOUS at 08:10

## 2025-06-26 RX ADMIN — TRANEXAMIC ACID 1 MG/KG/HR: 100 INJECTION, SOLUTION INTRAVENOUS at 07:54

## 2025-06-26 RX ADMIN — FENTANYL CITRATE 100 MCG: 50 INJECTION INTRAMUSCULAR; INTRAVENOUS at 08:42

## 2025-06-26 RX ADMIN — CEFAZOLIN 3000 MG: 10 INJECTION, POWDER, FOR SOLUTION INTRAVENOUS at 23:17

## 2025-06-26 RX ADMIN — CEFAZOLIN 3000 MG: 10 INJECTION, POWDER, FOR SOLUTION INTRAVENOUS at 16:11

## 2025-06-26 RX ADMIN — SODIUM CHLORIDE 1.5 UNITS/HR: 9 INJECTION, SOLUTION INTRAVENOUS at 08:43

## 2025-06-26 RX ADMIN — NICARDIPINE HYDROCHLORIDE 5 MG/HR: 25 INJECTION INTRAVENOUS at 23:55

## 2025-06-26 RX ADMIN — PROTAMINE SULFATE 500 MG: 10 INJECTION, SOLUTION INTRAVENOUS at 10:01

## 2025-06-26 RX ADMIN — FENTANYL CITRATE 150 MCG: 50 INJECTION INTRAMUSCULAR; INTRAVENOUS at 07:44

## 2025-06-26 RX ADMIN — HEPARIN SODIUM 53000 UNITS: 1000 INJECTION INTRAVENOUS; SUBCUTANEOUS at 08:35

## 2025-06-26 RX ADMIN — FENTANYL CITRATE 100 MCG: 50 INJECTION INTRAMUSCULAR; INTRAVENOUS at 10:13

## 2025-06-26 RX ADMIN — OXYCODONE 10 MG: 5 TABLET ORAL at 22:07

## 2025-06-26 RX ADMIN — PHENYLEPHRINE HYDROCHLORIDE 40 MCG/MIN: 10 INJECTION INTRAVENOUS at 07:51

## 2025-06-26 RX ADMIN — MELATONIN 6 MG: at 20:35

## 2025-06-26 RX ADMIN — ACETAMINOPHEN 1000 MG: 500 TABLET ORAL at 06:25

## 2025-06-26 RX ADMIN — SODIUM CHLORIDE: 9 INJECTION, SOLUTION INTRAVENOUS at 07:34

## 2025-06-26 RX ADMIN — OXYCODONE 10 MG: 5 TABLET ORAL at 18:13

## 2025-06-26 RX ADMIN — ACETAMINOPHEN 1000 MG: 500 TABLET, FILM COATED ORAL at 18:14

## 2025-06-26 RX ADMIN — SODIUM CHLORIDE: 0.9 INJECTION, SOLUTION INTRAVENOUS at 11:50

## 2025-06-26 RX ADMIN — FAMOTIDINE 20 MG: 10 INJECTION, SOLUTION INTRAVENOUS at 13:36

## 2025-06-26 RX ADMIN — ATORVASTATIN CALCIUM 40 MG: 40 TABLET, FILM COATED ORAL at 20:36

## 2025-06-26 RX ADMIN — PROPOFOL 110 MG: 10 INJECTION, EMULSION INTRAVENOUS at 07:44

## 2025-06-26 RX ADMIN — SUGAMMADEX 400 MG: 100 INJECTION, SOLUTION INTRAVENOUS at 11:18

## 2025-06-26 RX ADMIN — FENTANYL CITRATE 100 MCG: 50 INJECTION INTRAMUSCULAR; INTRAVENOUS at 10:49

## 2025-06-26 RX ADMIN — FENTANYL CITRATE 100 MCG: 50 INJECTION INTRAMUSCULAR; INTRAVENOUS at 08:10

## 2025-06-26 RX ADMIN — ALBUMIN (HUMAN) 12.5 G: 12.5 INJECTION, SOLUTION INTRAVENOUS at 10:36

## 2025-06-26 RX ADMIN — FENTANYL CITRATE 100 MCG: 50 INJECTION INTRAMUSCULAR; INTRAVENOUS at 07:10

## 2025-06-26 RX ADMIN — SODIUM CHLORIDE: 0.45 INJECTION, SOLUTION INTRAVENOUS at 11:49

## 2025-06-26 RX ADMIN — DEXMEDETOMIDINE 0.3 MCG/KG/HR: 100 INJECTION, SOLUTION INTRAVENOUS at 07:55

## 2025-06-26 RX ADMIN — NITROGLYCERIN 40 MCG: 20 INJECTION INTRAVENOUS at 08:12

## 2025-06-26 RX ADMIN — PHENYLEPHRINE HYDROCHLORIDE 40 MCG/MIN: 10 INJECTION INTRAVENOUS at 07:44

## 2025-06-26 RX ADMIN — NITROGLYCERIN 40 MCG: 20 INJECTION INTRAVENOUS at 08:14

## 2025-06-26 RX ADMIN — TRANEXAMIC ACID 1400 MG: 1 INJECTION, SOLUTION INTRAVENOUS at 07:54

## 2025-06-26 RX ADMIN — FAMOTIDINE 20 MG: 20 TABLET, FILM COATED ORAL at 20:36

## 2025-06-26 RX ADMIN — ONDANSETRON 4 MG: 2 INJECTION INTRAMUSCULAR; INTRAVENOUS at 11:12

## 2025-06-26 RX ADMIN — Medication 6 MCG/MIN: at 10:49

## 2025-06-26 RX ADMIN — SODIUM CHLORIDE, POTASSIUM CHLORIDE, SODIUM LACTATE AND CALCIUM CHLORIDE: 600; 310; 30; 20 INJECTION, SOLUTION INTRAVENOUS at 07:30

## 2025-06-26 RX ADMIN — FENTANYL CITRATE 50 MCG: 50 INJECTION INTRAMUSCULAR; INTRAVENOUS at 09:41

## 2025-06-26 RX ADMIN — SODIUM CHLORIDE, PRESERVATIVE FREE 10 ML: 5 INJECTION INTRAVENOUS at 20:46

## 2025-06-26 RX ADMIN — GABAPENTIN 300 MG: 300 CAPSULE ORAL at 06:25

## 2025-06-26 RX ADMIN — Medication 3000 MG: at 08:00

## 2025-06-26 RX ADMIN — LIDOCAINE HYDROCHLORIDE 100 MG: 20 INJECTION, SOLUTION EPIDURAL; INFILTRATION; INTRACAUDAL; PERINEURAL at 07:44

## 2025-06-26 ASSESSMENT — PAIN DESCRIPTION - LOCATION
LOCATION: CHEST
LOCATION: CHEST

## 2025-06-26 ASSESSMENT — PAIN DESCRIPTION - PAIN TYPE: TYPE: SURGICAL PAIN

## 2025-06-26 ASSESSMENT — PAIN SCALES - GENERAL
PAINLEVEL_OUTOF10: 0
PAINLEVEL_OUTOF10: 0
PAINLEVEL_OUTOF10: 7
PAINLEVEL_OUTOF10: 0
PAINLEVEL_OUTOF10: 8
PAINLEVEL_OUTOF10: 8
PAINLEVEL_OUTOF10: 6
PAINLEVEL_OUTOF10: 5
PAINLEVEL_OUTOF10: 9

## 2025-06-26 ASSESSMENT — PAIN DESCRIPTION - ORIENTATION
ORIENTATION: ANTERIOR
ORIENTATION: LEFT
ORIENTATION: ANTERIOR

## 2025-06-26 ASSESSMENT — PAIN - FUNCTIONAL ASSESSMENT
PAIN_FUNCTIONAL_ASSESSMENT: PREVENTS OR INTERFERES SOME ACTIVE ACTIVITIES AND ADLS
PAIN_FUNCTIONAL_ASSESSMENT: ACTIVITIES ARE NOT PREVENTED
PAIN_FUNCTIONAL_ASSESSMENT: PREVENTS OR INTERFERES WITH MANY ACTIVE NOT PASSIVE ACTIVITIES

## 2025-06-26 ASSESSMENT — PAIN DESCRIPTION - DESCRIPTORS
DESCRIPTORS: ACHING;SORE
DESCRIPTORS: ACHING

## 2025-06-26 ASSESSMENT — PULMONARY FUNCTION TESTS
PIF_VALUE: 11
PIF_VALUE: 24

## 2025-06-26 ASSESSMENT — PAIN DESCRIPTION - FREQUENCY: FREQUENCY: INTERMITTENT

## 2025-06-26 ASSESSMENT — PAIN DESCRIPTION - ONSET: ONSET: PROGRESSIVE

## 2025-06-26 NOTE — PERIOP NOTE
Beta Blocker taken on 06/25/2025 at 1706  Antibiotics given prior to incision - see anesthesia notes     Sacral padding applied in preop. Bilateral heel padding applied in OR.    0935 - CVICU notified of rewarming status.Spoke with Omar.    1010 - Nursing handoff given to CVICU.    TRANSFER - OUT REPORT:    Verbal report given to Omar Akins RN on Miko Toney  being transferred to CVICU for routine post-op       Report consisted of patient's Situation, Background, Assessment and   Recommendations(SBAR).     Information from the following report(s) Nurse Handoff Report, Procedure Verification, and Quality Measures was reviewed with the receiving nurse.           Lines:   Peripheral IV 06/20/25 Posterior;Right Forearm (Active)   Site Assessment Clean, dry & intact 06/26/25 0645   Line Status Flushed 06/26/25 0645   Line Care Connections checked and tightened 06/26/25 0645   Phlebitis Assessment No symptoms 06/26/25 0645   Infiltration Assessment 0 06/26/25 0645   Alcohol Cap Used Yes 06/26/25 0645   Dressing Status Clean, dry & intact 06/26/25 0645   Dressing Type Securing device 06/26/25 0645       Peripheral IV 06/21/25 Right;Posterior Hand (Active)   Site Assessment Clean, dry & intact 06/26/25 0739   Line Status Flushed 06/26/25 0739   Line Care Connections checked and tightened 06/26/25 0739   Phlebitis Assessment No symptoms 06/26/25 0739   Infiltration Assessment 0 06/26/25 0739   Alcohol Cap Used Yes 06/26/25 0739   Dressing Status Clean, dry & intact 06/26/25 0739   Dressing Type Transparent;Securing device 06/26/25 0739       Pacing Wires (Active)        Opportunity for questions and clarification was provided.      Patient transported with:  Monitor, O2 @ 10lpm, and Registered Nurse

## 2025-06-26 NOTE — ANESTHESIA PROCEDURE NOTES
Arterial Line:    An arterial line was placed using surface landmarks, in the pre-op for the following indication(s): continuous blood pressure monitoring and blood sampling needed.    A 20 gauge (size) (length), Arrow (type) catheter was placed, Seldinger technique used, into the right radial artery, secured by Tegaderm.  Anesthesia type: Local  Local infiltration: Injection    Events:  patient tolerated procedure well with no complications.6/26/2025 7:06 AM6/26/2025 7:14 AM  Anesthesiologist: Stoney Shirley MD  Performed: Anesthesiologist   Preanesthetic Checklist  Completed: patient identified, IV checked, site marked, risks and benefits discussed, surgical/procedural consents, equipment checked, pre-op evaluation, timeout performed, anesthesia consent given, oxygen available, monitors applied/VS acknowledged and fire risk safety assessment completed and verbalized

## 2025-06-26 NOTE — PROGRESS NOTES
Hospitalist Progress Note    NAME:   Miko Toney   : 1975   MRN: [id number]     Date/Time: 2025 09:00 AM  Patient PCP: Bridger Joshi MD    Estimated discharge date: , Home with OP Cardiac R3hab  Barriers: CABG awaited on  (delayed due to Brillinta Washout)      Assessment / Plan:    NSTEMI  Due to Multivessel CAD POA- on Cardiac cath, awaiting CABG on Today after Brillinta washout  - admitted to IVCU from [facility] as transfer  - cardiology consulted - following  - Cardiac Surgery consulted- following, CABG on Thursday planned, preop workup in progress  - anticoagulation:  heparin infusion  - beta blocker: continue coreg 6.25 mg BID  - DAPT: ASA 81 mg daily   - Continue atorvastatin 40 mg qd     - nitro PRN (any form)  - morphine PRN  - routine cardiac labs  - telemetry, pulse ox  - trop/EKG for recurrent CP, notify MD  - optimize comorbid conditions  WILL TRANSFER to Cardiac Surgery Service today - Discussed with RACHEL Sadler     Asymptomatic Leukocytosis POA- no left shift, afebrile, no signs of any infection, likely insignificant unless fever spikes  Presumed UTI at OSH POA- ruled out with neg urine cx  Initial Blood Cx= neg x  5 days now  - continue IV Ceftriaxone to complete 5 days empiric  Outpatient Hematology Referral recommended      JAMES  - home CPAP     Hypertension  - Continue current therapy               Medical Decision Making:   I personally reviewed labs: Yes   I personally reviewed imaging: Yes  I personally reviewed EKG: Yes  Toxic drug monitoring: PTT monitoring on heparin drip to prevent bleeding toxicity  Discussed case with: Pt, RN, CTSurgery NP (Viktoriya)        Code Status: Full code  DVT Prophylaxis: IV heparin  GI Prophylaxis: none    Subjective:     Chief Complaint / Reason for Physician Visit:  F/u for NSTEMI, CAD 3 v disease, asymptomatic Leukocytosis  \"I am ok\".  Discussed with RN events overnight.     Pt planned for CABG

## 2025-06-26 NOTE — CONSULTS
Name: Miko Toney   : 1975   MRN: [id number]   Date: 2025        No chief complaint on file.        HPI:     Miko Toney is 49 y.o.  male with history of hypertension, hyperlipidemia, prediabetes, JAMES, asthma, GERD, gout, arthritis, HSV, GBS in .  Patient was admitted to [facility] on  for chest pain and he was found to have NSTEMI.  And left heart cath revealed multivessel coronary artery disease.  Patient underwent CABG x 4 and postoperatively admitted to CVICU.  Critical care consult is requested for comanagement.  Patient is currently intubated, sedation is being weaned off.        Assessment/Plan:     Post cardiac surgery care: S/P CABG x 4  - Evaluation of postoperative labs and imaging studies have been performed.   - Keep on full ventilatory support for the time being and wean as tolerated when more awake.   - If unable to wean vent and patient requires increasing vent support will Rx with sedation and analgesia to facilitate vent synchrony.   - HOB elevated to 30 degrees with VAP bundle.   - Hemodynamic management per CT surgery postoperative guidelines.   - ASA and statin for CAD Rx when feasible.   - When hemodynamically tolerated will also consider initiation of beta blockers.   - Insulin for glycemic control.    Shock. Vasoplegic vs cardiogenic.  - Wean pressors and inotropes as able.   - hemodynamic management in collaboration with CT surgery    Ventilator Management.  - Keep on full ventilatory support for the time being and wean as tolerated when more awake.   - If unable to wean vent and patient requires increasing vent support will Rx with sedation and analgesia to facilitate vent synchrony.   - Post-extubation pulmonary toilette with adequate postop pain control.   - Early mobilization with PT/OT as able      Prophylaxis  - DVT prophylaxis. Start ROSA when ok with CTS.  - VAP bundle with chlorhexidine    Code status: full code.      40min of

## 2025-06-26 NOTE — PROGRESS NOTES
[facility]  [address] [place] [address] [phone]  [email or web address]  CARDIOLOGY PROGRESS NOTE    6/26/2025 2:44 PM    Admit Date: 6/21/2025    Admit Diagnosis:   CAD, multiple vessel [I25.10]    Subjective:     Miko Toney   Please show to s/p 4-vessel CABG today.  LIMA to LAD, SVG to OM, diagonal, SVG to PDA.  He is currently just on insulin and has a chest tube and mediastinal tubes in place.  He is in sinus rhythm with blood pressures 131/95 mmHg heart rate 78 bpm   Just got extubated about 5 minutes ago  /74   Pulse 68   Temp 97.9 °F (36.6 °C)   Resp 15   Ht 1.829 m (6')   Wt 133.4 kg (294 lb)   SpO2 94%   BMI 39.87 kg/m²     Current Facility-Administered Medications   Medication Dose Route Frequency    dexmedeTOMIDine (PRECEDEX) 400 mcg in sodium chloride 0.9 % 100 mL infusion  0.1-1.5 mcg/kg/hr IntraVENous Continuous    gabapentin (NEURONTIN) capsule 200 mg  200 mg Oral TID    0.9 % sodium chloride infusion   IntraVENous Continuous    0.45 % sodium chloride infusion   IntraVENous Continuous    sodium chloride flush 0.9 % injection 5-40 mL  5-40 mL IntraVENous 2 times per day    sodium chloride flush 0.9 % injection 5-40 mL  5-40 mL IntraVENous PRN    ondansetron (ZOFRAN) injection 4 mg  4 mg IntraVENous Q4H PRN    acetaminophen (TYLENOL) tablet 1,000 mg  1,000 mg Oral Q6H    lidocaine 4 % external patch 2 patch  2 patch Topical Daily    oxyCODONE (ROXICODONE) immediate release tablet 5 mg  5 mg Oral Q4H PRN    Or    oxyCODONE (ROXICODONE) immediate release tablet 10 mg  10 mg Oral Q4H PRN    HYDROmorphone (DILAUDID) injection 0.5 mg  0.5 mg IntraVENous Q4H PRN    [START ON 6/27/2025] amiodarone (CORDARONE) tablet 400 mg  400 mg Oral BID    chlorhexidine (PERIDEX) 0.12 % solution 15 mL  15 mL Mouth/Throat BID    hydrALAZINE (APRESOLINE) injection 10 mg  10 mg IntraVENous Q6H PRN    [START ON 6/27/2025] magnesium oxide (MAG-OX)

## 2025-06-26 NOTE — PROGRESS NOTES
Cardiac Surgery Coordinator met with the family of Miko Toney, introduced role of the Cardiac Surgery Care Coordinator.  Reviewed plan of care and day of surgery expectations. Provided family with an update from OR.  Encouraged family to verbalize and emotional support given.  Will continue to update throughout the day.    0903 - Met with family of Miko Toney, provided family with update of on by-pass.  Family without questions or concerns at this time.  Will continue to follow for educational and emotional needs.     0938 - Met with family of Miko Toney, provided family with update of rewarming.  Family without questions or concerns at this time.  Will continue to follow for educational and emotional needs.     1003 - Met with family of Miko Toney, provided family with update of off by-pass.  Family without questions or concerns at this time.  Will continue to follow for educational and emotional needs.

## 2025-06-26 NOTE — ANESTHESIA PRE PROCEDURE
Department of Anesthesiology  Preprocedure Note       Name:  Miko Toney   Age:  49 y.o.  :  1975                                          MRN:  [id number]         Date:  2025      Surgeon: Surgeon(s):  Dimitris Matt MD    Procedure: Procedure(s):  ON PUMP CORONARY ARTERY BYPASS GRAFT (CABG) x4 WITH ENDOSCOPIC VEIN HARVESTING (EVH), WITH CARDIPLEGIA (NO PAC)(E.R.A.S.). INTRAOPERTIVE LUCINDA DONE BY DR. ARVIZU. EVH DONE BY JOEY CALLES    Medications prior to admission:   Prior to Admission medications    Medication Sig Start Date End Date Taking? Authorizing Provider   losartan-hydroCHLOROthiazide (HYZAAR) 100-25 MG per tablet Take 1 tablet by mouth daily   Yes Provider, MD Taylor   pantoprazole (PROTONIX) 40 MG tablet Take 1 tablet by mouth daily   Yes Provider, MD Taylor   spironolactone (ALDACTONE) 25 MG tablet Take 1 tablet by mouth daily   Yes Provider, MD Taylor   methylphenidate (CONCERTA) 18 MG extended release tablet Take 3 tablets by mouth every morning.   Yes Provider, MD Taylor   allopurinol (ZYLOPRIM) 300 MG tablet Take 1 tablet by mouth daily   Yes Provider, MD Taylor   valACYclovir (VALTREX) 500 MG tablet Take 1 tablet by mouth daily   Yes Provider, MD Taylor   cyclobenzaprine (FLEXERIL) 10 MG tablet Take 10 mg by mouth 3 times daily as needed  Patient not taking: Reported on 2025   Automatic Reconciliation, Ar   naproxen (NAPROSYN) 500 MG tablet Take 500 mg by mouth 2 times daily (with meals)  Patient not taking: Reported on 2025   Automatic Reconciliation, Ar       Current medications:    Current Facility-Administered Medications   Medication Dose Route Frequency Provider Last Rate Last Admin   • ceFAZolin (ANCEF) 3000 mg in sodium chloride 0.9% 100 mL IVPB  3,000 mg IntraVENous On Call to OR Carla Sadler, APRN - NP       • sod chloride IRR soln 0.9 % 1,000 mL with heparin (porcine) 4,000 Units    PRN Dimitris Matt MD

## 2025-06-26 NOTE — PROGRESS NOTES
CVICU End of Shift Note    Bedside shift change report given to JOHN Nelson (oncoming nurse) by Connie Mcdermott RN (offgoing nurse).  Report included the following information SBAR, Intake/Output, and Recent Results    Shift worked:  7p-7a     Shift summary and any significant changes:     Mr. Toney is alert and oriented. He has no complaints of pain or discomfort. He he took a shower at midnight and a CHG bath at 0400. He drank 28oz of gatorade at 2200 and 14oz at 0400. He is up ad atilio. Wife and daughters are at bedside. Report given to pre-op RN this morning     Concerns for physician to address:  N/a       Activity:  Level of Assistance: Independent  Number times ambulated in hallways past shift: 2  Number of times OOB to chair past shift: 2    Neurologic:  Level of Consciousness: Alert (0)  Orientation Level: Oriented X4  Cognition: Appropriate judgement, Follows commands  Speech: Clear, Appropriate for developmental age    Cardiac:   Cardiac Rhythm: Sinus rhythm      Respiratory:   O2 Device: None (Room air)  ISS Teaching YES   Use : YES     Volume: 2000    Incentive Spirometry Tx  Treatment Effort: Independent     Genitourinary:   Urinary Status: Voiding    GI:  Last BM (including prior to admit): 06/25/25  Current diet:  Diet NPO Exceptions are: Sips of Clear Liquids  Passing flatus: YES    Lines/Drains/Airway:  Peripheral IV    Peripheral IV x 2: YES        Skin:    Wound Care Documentation:         Terry Scale Score: 23  Interventions: Wound Offloading (Prevention Methods): Turning        CHG Bath Completed This Shift: YES    Pain Management:   Excellent - Able to sleep and participate with therapy     Patient Safety:  Fall Risk: Nursing Judgement-Fall Risk High(Add Comments): Yes  Fall Risk Interventions  Nursing Judgement-Fall Risk High(Add Comments): Yes  Toilet Every 2 Hours-In Advance of Need: No (Comment)  Hourly Visual Checks: Awake  Fall Visual Posted: Socks  Room Door Open: Yes  Alarm On: Other

## 2025-06-26 NOTE — ANESTHESIA PROCEDURE NOTES
Procedure Performed: LUCINDA       Start Time:        End Time:      Anesthesia Information  Performed Personally  Anesthesiologist:  Stoney Shirley MD        Preanesthesia Checklist:  Patient identified, IV assessed, risks and benefits discussed, monitors and equipment assessed, procedure being performed at surgeon's request and anesthesia consent obtained.    General Procedure Information  Diagnostic Indications for Echo:  assessment of ascending aorta, assessment of surgical repair, hemodynamic monitoring and assessment of valve function  Location performed:  OR  Intubated  Bite block placed  Heart visualized  Probe Insertion:  Easy  Probe Type:  3D, mulitplane, epiaortic and biplane  Modalities:  2D, 3D, color flow mapping, continuous wave Doppler, pulse wave Doppler and M-mode    Echocardiographic and Doppler Measurements    Ventricles    Ventricle  Cavity Size  Cavity          Dimension  Hypertrophy  Thrombus  Global FXN  EF    RV  normal    No  No  normal      LV  normal    No  No  normal       Ventricular Findings Comments:  LVEF >55%    Ventricular Regional Function:  1- Basal Anteroseptal:  normal  2- Basal Anterior:  normal  3- Basal Anterolateral:  normal  4- Basal Inferolateral:  normal  5- Basal Inferior:  normal  6- Basal Inferoseptal:  normal  7- Mid Anteroseptal:  normal  8- Mid Anterior:  normal  9- Mid Anterolateral:  normal  10- Mid Inferolateral:  normal  11- Mid Inferior:  normal  12- Mid Inferoseptal:  normal  13- Apical Anterior:  normal  14- Apical Lateral:  normal  15- Apical Inferior:  normal  16- Apical Septal:  normal  17- [place]:  normal    Ventricular Wall Motion Comments:  No WMA    Valves     Valves  Annulus  Stenosis Measurements   Regurg  Leaflet   Morph  Leaflet   Motion Valve Comments    Aortic normal Stenosis none.            none   normal normal       Mitral normal none             moderate normal normal    Tricuspid normal   not present         none   normal   normal      Pulmonic

## 2025-06-26 NOTE — PLAN OF CARE
Problem: Discharge Planning  Goal: Discharge to home or other facility with appropriate resources  Outcome: Progressing  Flowsheets  Taken 6/25/2025 2100 by Cnonie Mcdermott RN  Discharge to home or other facility with appropriate resources:   Identify barriers to discharge with patient and caregiver   Arrange for needed discharge resources and transportation as appropriate   Identify discharge learning needs (meds, wound care, etc)  Taken 6/25/2025 0832 by Elizabeth Wright RN  Discharge to home or other facility with appropriate resources:   Identify barriers to discharge with patient and caregiver   Identify discharge learning needs (meds, wound care, etc)     Problem: ABCDS Injury Assessment  Goal: Absence of physical injury  Outcome: Progressing     Problem: Cardiovascular - Adult  Goal: Maintains optimal cardiac output and hemodynamic stability  Outcome: Progressing  Flowsheets  Taken 6/25/2025 2100 by Connie Mcdermott RN  Maintains optimal cardiac output and hemodynamic stability: Monitor blood pressure and heart rate  Taken 6/25/2025 0832 by Elizabeth Wright RN  Maintains optimal cardiac output and hemodynamic stability:   Monitor blood pressure and heart rate   Monitor urine output and notify Licensed Independent Practitioner for values outside of normal range   Assess for signs of decreased cardiac output  Goal: Absence of cardiac dysrhythmias or at baseline  Outcome: Progressing  Flowsheets  Taken 6/25/2025 2100 by Connie Mcdermott RN  Absence of cardiac dysrhythmias or at baseline: Monitor cardiac rate and rhythm  Taken 6/25/2025 0832 by Elizabeth Wright RN  Absence of cardiac dysrhythmias or at baseline:   Monitor cardiac rate and rhythm   Administer antiarrhythmia medication and electrolyte replacement as ordered     Problem: Skin/Tissue Integrity - Adult  Goal: Incisions, wounds, or drain sites healing without S/S of infection  Outcome: Progressing  Flowsheets (Taken 6/25/2025 0832 by Elizabeth Wright

## 2025-06-26 NOTE — DIABETES MGMT
[facility]  DIABETES MANAGEMENT CONSULT    Consulted by Provider for advanced nursing evaluation and care for inpatient blood glucose management.    Evaluation and Action Plan   Miko Toney is a 49 y.o. male with no known hx of diabetes. Admitted for CABG surgery. He was originally seen a week prior for complaints of chest pain. Troponin trended up to 859. Cardiac cath with final impression of Multivessel critical coronary stenosis with preserved left ventricular systolic contractility. CABG workup.  PMHx significant for hypertension, GERD, prediabetes and gout.    The patient was started on the insulin infusion per CTS protocol. He will remain x48 hours. The patient is using a significant amount of insulin this morning. I suspect he may require antihyperglycemic intervention for a couple days once transitioned off the insulin infusion. I do think he will require at discharge, However he may benefit from Metformin. This can be discussed prior to discharge   Blood glucose pattern        Management Rationale Action Plan   Medication   Basal needs Using insulin infusion per CTS protocol  Transition off the insulin infusion tomorrow   Nutritional needs Covers carb load in meals    Corrective insulin Using medium dose sensitivity based on weight    Additional orders  Carb consistent diet (60g CHO/meal)       Diabetes Discharge Plan   Medication  A1c 6.1%. The patient does not take diabetes medication and will likely not require at discharge.    Additional orders  Check A1c at least twice yearly  Lifestyle modifications to help prevent diabetes     Past Medical History     Past Medical History:   Diagnosis Date    Allergic rhinitis due to pollen 06/21/2025    Asthma     Circadian rhythm sleep disorder, shift work type 06/21/2025    Gastroesophageal reflux disease without esophagitis 06/21/2025    Genital herpes simplex 06/21/2025    Gout 06/21/2025    History of Guillain-Hermleigh syndrome

## 2025-06-26 NOTE — BRIEF OP NOTE
BRIEF OP NOTE  Pre-Op Diagnosis: Coronary artery disease [I25.10]    Post-Op Diagnosis: Coronary artery disease [I25.10]      Procedure: Procedure(s):  CABG X 4 LIMA to LAD, Sequential RSVG to OM, Diag, RSVG to PDA  RIGHT ENDOSCOPIC SAPHENOUS VEIN HARVEST    Surgeon:  Dr. Vernell MD    Assistant(s): Todd Brasher PA-C    Anesthesia: General      Infusions:   Cedric, Precedex, Insulin     Estimated Blood Loss: 400 ml     Cell Saver: 935 ml     Bypass Time: 61 min     Cross Clamp Time: 55 min    Specimens: * No specimens in log *    Drains and pacing wires: 2 Atrial Wires  1 Bipolar Ventricular Wire 3 Louie Drains (2 Mediastinal, 1 L Pleural)     Complications: none    Findings: See full operative note.    Implants: * No implants in log *

## 2025-06-26 NOTE — ANESTHESIA POSTPROCEDURE EVALUATION
Department of Anesthesiology  Postprocedure Note    Patient: Miko Toney  MRN: [id number]  YOB: 1975  Date of evaluation: 6/26/2025    Procedure Summary       Date: 06/26/25 Room / Location: [facility] HEART OR M6 / [facility] OPEN HEART    Anesthesia Start: 0728 Anesthesia Stop: 1126    Procedure: ON PUMP CORONARY ARTERY BYPASS GRAFT (CABG) x4 WITH ENDOSCOPIC VEIN HARVESTING (EVH), WITH CARDIPLEGIA (NO PAC)(E.R.A.S.). INTRAOPERTIVE LUCINDA DONE BY DR. ARVIZU. EVH DONE BY JOEY CALLES (Chest) Diagnosis:       Coronary artery disease      (Coronary artery disease [I25.10])    Surgeons: Dimitris Matt MD Responsible Provider: Stoney Arvizu MD    Anesthesia Type: General ASA Status: 4            Anesthesia Type: General    Naz Phase I:      Naz Phase II:      Anesthesia Post Evaluation    Patient location during evaluation: PACU  Patient participation: complete - patient participated  Level of consciousness: responsive to verbal stimuli and sleepy but conscious  Pain score: 2  Airway patency: patent  Cardiovascular status: blood pressure returned to baseline  Respiratory status: acceptable  Hydration status: stable  Comments: +Post-Anesthesia Evaluation and Assessment    Patient: Miko Toney MRN: [id number]  SSN: [id number]   YOB: 1975  Age: 49 y.o.  Sex: male          Cardiovascular Function/Vital Signs    /72   Pulse 67   Temp 97.7 °F (36.5 °C)   Resp 15   Ht 1.829 m (6')   Wt 133.4 kg (294 lb)   SpO2 94%   BMI 39.87 kg/m²     Patient is status post Procedure(s):  ON PUMP CORONARY ARTERY BYPASS GRAFT (CABG) x4 WITH ENDOSCOPIC VEIN HARVESTING (EVH), WITH CARDIPLEGIA (NO PAC)(E.R.A.S.). INTRAOPERTIVE LUCINDA DONE BY DR. ARVIZU. EVH DONE BY JOEY CALLES.    Nausea/Vomiting: Controlled.    Postoperative hydration reviewed and adequate.    Pain:      Managed.    Neurological Status:       At baseline.    Mental Status and Level of Consciousness: Arousable.    Pulmonary Status:

## 2025-06-26 NOTE — PROGRESS NOTES
1112:  Patient arrived to room from OR. Precedex, Insulin infusing.     1310:  Patient awake and following commands, placed on Spont vent mode.    1437:  Patient extubated to 4L bled through CPAP.     Miko Toney was extubated on 06/26/25 at 1437 PM.  The patient was up to the chair for the first time on 6/26/25 at 1747 PM until  . The patient tolerated the activity with difficulty.

## 2025-06-26 NOTE — ANESTHESIA PROCEDURE NOTES
Central Venous Line:    A central venous line was placed using ultrasound guidance and surface landmarks, in the pre-op for the following indication(s): central venous access and CVP monitoring.6/26/2025 7:14 AM6/26/2025 7:26 AM    Sterility preparation included the following: provider used sterile gloves, gown, hat and mask and maximum sterile barriers used during central venous catheter insertion.    The patient was placed in Trendelenburg position.The right internal jugular vein was prepped.    The site was prepped with Chloraprep.  A 9 Fr (size), 12 (length), introducer double lumen was placed.    During the procedure, the following specific steps were taken: target vein identified, needle advanced into vein and blood aspirated and guidewire advanced into vein.    Intravenous verification was obtained by ultrasound, venous blood return and manometry.    Post insertion care included: all ports aspirated, all ports flushed easily, guidewire removed intact, Biopatch applied, line sutured in place and dressing applied.    During the procedure the patient experienced: patient tolerated procedure well with no complications.      Outcomes: uncomplicated  Anesthesia type: local..No  Staffing  Performed: Anesthesiologist   Anesthesiologist: Stoney Shirley MD  Performed by: Stoney Shirley MD  Authorized by: Stoney Shirley MD    Preanesthetic Checklist  Completed: patient identified, IV checked, site marked, risks and benefits discussed, surgical/procedural consents, equipment checked, pre-op evaluation, timeout performed, anesthesia consent given, oxygen available, monitors applied/VS acknowledged and fire risk safety assessment completed and verbalized

## 2025-06-26 NOTE — PROGRESS NOTES
PT note:     Orders received and acknowledged. Chart reviewed and noted patient is s/p CABG x 4 POD 0. Per guidelines will follow up tomorrow for PT evaluation.     Sherley Bermudez, PT, DPT

## 2025-06-26 NOTE — ADT AUTH CERT
Progress Notes by Alia Stewart MD at 2025  2:14 PM    Author: Alia Stewart MD Service: Hospitalist Author Type: Physician   Filed: 2025  3:01 PM Date of Service: 2025  2:14 PM Status: Signed   : Alia Stewart MD (Physician)           Hospitalist Progress Note     NAME:              Miko oTney   :   1975   MRN:   [id number]      Date/Time: 2025 2:14 PM  Patient PCP: Bridger Joshi MD     Estimated discharge date:   Barriers: Cardiac surgery plan for possible bypass        Assessment / Plan:     AC  NSTEMI  Multivessel CAD requiring CABG evaluation  - admitted to [facility]CU from [facility] as [facility]  - cariology consulted prior to transfer, formal consult placed on arrival               --> will defer to cardiology for coordination of CABG evaluation  - anticoagulation:  heparin infusion  - beta blocker: continue coreg 6.25 mg BID  - DAPT: ASA 81 mg daily   - Continue atorvastatin 40 mg qd     - nitro PRN (any form)  - morphine PRN  - routine cardiac labs  - telemetry, pulse ox  - trop/EKG for recurrent CP, notify MD  - optimize comorbid conditions     JAMES  - home CPAP     Hypertension  - Continue current therapy     Questionable UTI at OSH  - continue IV CTX to complete 5 day course              Medical Decision Making:   I personally reviewed labs: Yes   I personally reviewed imaging: Yes  I personally reviewed EKG: Yes  Toxic drug monitoring:   Discussed case with:            Code Status:   DVT Prophylaxis:   GI Prophylaxis:     Subjective:      Chief Complaint / Reason for Physician Visit  \"\".  Discussed with RN events overnight.         Objective:      VITALS:   Last 24hrs VS reviewed since prior progress note. Most recent are:  Patient Vitals for the past 24 hrs:    BP Temp Temp src Pulse Resp SpO2 Height Weight   25 1145 125/86 -- -- 70 -- -- -- --   25 1140 129/85 98.7 °F (37.1 °C) Oral 69 16 95 % -- --   25 0815 134/87

## 2025-06-26 NOTE — PROGRESS NOTES
Occupational Therapy note:    Order acknowledged and chart reviewed. Patient s/p CABG x4 POD 0. Will defer OT consult and follow up with tomorrow per guidelines.    Ernestina Rudd, OTR/L, OTD

## 2025-06-27 ENCOUNTER — APPOINTMENT (OUTPATIENT)
Facility: HOSPITAL | Age: 50
DRG: 280 | End: 2025-06-27
Attending: STUDENT IN AN ORGANIZED HEALTH CARE EDUCATION/TRAINING PROGRAM
Payer: COMMERCIAL

## 2025-06-27 LAB
ACUTE KIDNEY INJURY RISK NEPHROCHECK: 0.26 (ref 0–0.3)
ANION GAP SERPL CALC-SCNC: 7 MMOL/L (ref 2–12)
ARTERIAL PATENCY WRIST A: ABNORMAL
ARTERIAL PATENCY WRIST A: NORMAL
BASE DEFICIT BLDA-SCNC: 2.3 MMOL/L
BASE DEFICIT BLDA-SCNC: 3.2 MMOL/L
BDY SITE: ABNORMAL
BDY SITE: NORMAL
BUN SERPL-MCNC: 15 MG/DL (ref 6–20)
BUN/CREAT SERPL: 19 (ref 12–20)
CALCIUM SERPL-MCNC: 8.6 MG/DL (ref 8.5–10.1)
CHLORIDE SERPL-SCNC: 106 MMOL/L (ref 97–108)
CO2 SERPL-SCNC: 25 MMOL/L (ref 21–32)
CREAT SERPL-MCNC: 0.79 MG/DL (ref 0.7–1.3)
ERYTHROCYTE [DISTWIDTH] IN BLOOD BY AUTOMATED COUNT: 13.7 % (ref 11.5–14.5)
FIO2 ON VENT: 60 %
GAS FLOW.O2 O2 DELIVERY SYS: 6 L/MIN
GAS FLOW.O2 SETTING OXYMISER: 14
GLUCOSE BLD STRIP.AUTO-MCNC: 104 MG/DL (ref 65–117)
GLUCOSE BLD STRIP.AUTO-MCNC: 116 MG/DL (ref 65–117)
GLUCOSE BLD STRIP.AUTO-MCNC: 118 MG/DL (ref 65–117)
GLUCOSE BLD STRIP.AUTO-MCNC: 123 MG/DL (ref 65–117)
GLUCOSE BLD STRIP.AUTO-MCNC: 129 MG/DL (ref 65–117)
GLUCOSE BLD STRIP.AUTO-MCNC: 130 MG/DL (ref 65–117)
GLUCOSE BLD STRIP.AUTO-MCNC: 130 MG/DL (ref 65–117)
GLUCOSE BLD STRIP.AUTO-MCNC: 131 MG/DL (ref 65–117)
GLUCOSE BLD STRIP.AUTO-MCNC: 132 MG/DL (ref 65–117)
GLUCOSE BLD STRIP.AUTO-MCNC: 133 MG/DL (ref 65–117)
GLUCOSE BLD STRIP.AUTO-MCNC: 135 MG/DL (ref 65–117)
GLUCOSE BLD STRIP.AUTO-MCNC: 140 MG/DL (ref 65–117)
GLUCOSE BLD STRIP.AUTO-MCNC: 140 MG/DL (ref 65–117)
GLUCOSE BLD STRIP.AUTO-MCNC: 143 MG/DL (ref 65–117)
GLUCOSE BLD STRIP.AUTO-MCNC: 146 MG/DL (ref 65–117)
GLUCOSE SERPL-MCNC: 121 MG/DL (ref 65–100)
HCO3 BLDA-SCNC: 22 MMOL/L (ref 22–26)
HCO3 BLDA-SCNC: 23 MMOL/L (ref 22–26)
HCT VFR BLD AUTO: 38.7 % (ref 36.6–50.3)
HGB BLD-MCNC: 12.5 G/DL (ref 12.1–17)
MAGNESIUM SERPL-MCNC: 2.1 MG/DL (ref 1.6–2.4)
MCH RBC QN AUTO: 29.4 PG (ref 26–34)
MCHC RBC AUTO-ENTMCNC: 32.3 G/DL (ref 30–36.5)
MCV RBC AUTO: 91.1 FL (ref 80–99)
NRBC # BLD: 0 K/UL (ref 0–0.01)
NRBC BLD-RTO: 0 PER 100 WBC
PCO2 BLDA: 41 MMHG (ref 35–45)
PCO2 BLDA: 43 MMHG (ref 35–45)
PEEP RESPIRATORY: 8
PH BLDA: 7.35 (ref 7.35–7.45)
PH BLDA: 7.35 (ref 7.35–7.45)
PHOSPHATE SERPL-MCNC: 3.5 MG/DL (ref 2.6–4.7)
PLATELET # BLD AUTO: 175 K/UL (ref 150–400)
PMV BLD AUTO: 11.8 FL (ref 8.9–12.9)
PO2 BLDA: 59 MMHG (ref 80–100)
PO2 BLDA: 87 MMHG (ref 80–100)
POTASSIUM SERPL-SCNC: 4.2 MMOL/L (ref 3.5–5.1)
PRESSURE SUPPORT SETTING VENT: 16
RBC # BLD AUTO: 4.25 M/UL (ref 4.1–5.7)
SAO2 % BLD: 89 % (ref 92–97)
SAO2 % BLD: 96 % (ref 92–97)
SAO2% DEVICE SAO2% SENSOR NAME: ABNORMAL
SAO2% DEVICE SAO2% SENSOR NAME: NORMAL
SERVICE CMNT-IMP: ABNORMAL
SERVICE CMNT-IMP: NORMAL
SERVICE CMNT-IMP: NORMAL
SODIUM SERPL-SCNC: 138 MMOL/L (ref 136–145)
SPECIMEN SITE: ABNORMAL
SPECIMEN SITE: NORMAL
VENTILATION MODE VENT: ABNORMAL
VENTILATION MODE VENT: NORMAL
WBC # BLD AUTO: 30.1 K/UL (ref 4.1–11.1)

## 2025-06-27 PROCEDURE — 82803 BLOOD GASES ANY COMBINATION: CPT

## 2025-06-27 PROCEDURE — 99221 1ST HOSP IP/OBS SF/LOW 40: CPT

## 2025-06-27 PROCEDURE — 97110 THERAPEUTIC EXERCISES: CPT

## 2025-06-27 PROCEDURE — 2580000003 HC RX 258: Performed by: PHYSICIAN ASSISTANT

## 2025-06-27 PROCEDURE — 97116 GAIT TRAINING THERAPY: CPT

## 2025-06-27 PROCEDURE — 6360000002 HC RX W HCPCS: Performed by: HOSPITALIST

## 2025-06-27 PROCEDURE — 6360000002 HC RX W HCPCS: Performed by: NURSE PRACTITIONER

## 2025-06-27 PROCEDURE — 6370000000 HC RX 637 (ALT 250 FOR IP): Performed by: PHYSICIAN ASSISTANT

## 2025-06-27 PROCEDURE — 6360000002 HC RX W HCPCS: Performed by: PHYSICIAN ASSISTANT

## 2025-06-27 PROCEDURE — 83735 ASSAY OF MAGNESIUM: CPT

## 2025-06-27 PROCEDURE — 37799 UNLISTED PX VASCULAR SURGERY: CPT

## 2025-06-27 PROCEDURE — 2580000003 HC RX 258: Performed by: HOSPITALIST

## 2025-06-27 PROCEDURE — 6370000000 HC RX 637 (ALT 250 FOR IP)

## 2025-06-27 PROCEDURE — 2060000000 HC ICU INTERMEDIATE R&B

## 2025-06-27 PROCEDURE — 71045 X-RAY EXAM CHEST 1 VIEW: CPT

## 2025-06-27 PROCEDURE — 97162 PT EVAL MOD COMPLEX 30 MIN: CPT

## 2025-06-27 PROCEDURE — 97530 THERAPEUTIC ACTIVITIES: CPT | Performed by: OCCUPATIONAL THERAPIST

## 2025-06-27 PROCEDURE — 36415 COLL VENOUS BLD VENIPUNCTURE: CPT

## 2025-06-27 PROCEDURE — 82962 GLUCOSE BLOOD TEST: CPT

## 2025-06-27 PROCEDURE — 94660 CPAP INITIATION&MGMT: CPT

## 2025-06-27 PROCEDURE — 6360000002 HC RX W HCPCS

## 2025-06-27 PROCEDURE — 97110 THERAPEUTIC EXERCISES: CPT | Performed by: OCCUPATIONAL THERAPIST

## 2025-06-27 PROCEDURE — 97165 OT EVAL LOW COMPLEX 30 MIN: CPT | Performed by: OCCUPATIONAL THERAPIST

## 2025-06-27 PROCEDURE — 84100 ASSAY OF PHOSPHORUS: CPT

## 2025-06-27 PROCEDURE — 2700000000 HC OXYGEN THERAPY PER DAY

## 2025-06-27 PROCEDURE — 80048 BASIC METABOLIC PNL TOTAL CA: CPT

## 2025-06-27 PROCEDURE — 85027 COMPLETE CBC AUTOMATED: CPT

## 2025-06-27 PROCEDURE — 2580000003 HC RX 258

## 2025-06-27 PROCEDURE — 93005 ELECTROCARDIOGRAM TRACING: CPT | Performed by: PHYSICIAN ASSISTANT

## 2025-06-27 PROCEDURE — 6370000000 HC RX 637 (ALT 250 FOR IP): Performed by: NURSE PRACTITIONER

## 2025-06-27 PROCEDURE — 97530 THERAPEUTIC ACTIVITIES: CPT

## 2025-06-27 PROCEDURE — 2500000003 HC RX 250 WO HCPCS: Performed by: PHYSICIAN ASSISTANT

## 2025-06-27 PROCEDURE — 2500000003 HC RX 250 WO HCPCS

## 2025-06-27 RX ORDER — NALOXONE HYDROCHLORIDE 0.4 MG/ML
INJECTION, SOLUTION INTRAMUSCULAR; INTRAVENOUS; SUBCUTANEOUS PRN
Status: DISCONTINUED | OUTPATIENT
Start: 2025-06-27 | End: 2025-06-30 | Stop reason: HOSPADM

## 2025-06-27 RX ORDER — HYDROMORPHONE HYDROCHLORIDE 1 MG/ML
1 INJECTION, SOLUTION INTRAMUSCULAR; INTRAVENOUS; SUBCUTANEOUS EVERY 4 HOURS PRN
Status: DISCONTINUED | OUTPATIENT
Start: 2025-06-27 | End: 2025-06-27 | Stop reason: SDUPTHER

## 2025-06-27 RX ORDER — HYDRALAZINE HYDROCHLORIDE 20 MG/ML
20 INJECTION INTRAMUSCULAR; INTRAVENOUS EVERY 6 HOURS PRN
Status: DISCONTINUED | OUTPATIENT
Start: 2025-06-27 | End: 2025-06-30 | Stop reason: HOSPADM

## 2025-06-27 RX ORDER — METOPROLOL TARTRATE 25 MG/1
12.5 TABLET, FILM COATED ORAL 2 TIMES DAILY
Status: DISCONTINUED | OUTPATIENT
Start: 2025-06-27 | End: 2025-06-30 | Stop reason: HOSPADM

## 2025-06-27 RX ORDER — POLYETHYLENE GLYCOL 3350 17 G/17G
17 POWDER, FOR SOLUTION ORAL DAILY PRN
Status: DISCONTINUED | OUTPATIENT
Start: 2025-06-27 | End: 2025-06-30 | Stop reason: HOSPADM

## 2025-06-27 RX ORDER — KETOROLAC TROMETHAMINE 30 MG/ML
30 INJECTION, SOLUTION INTRAMUSCULAR; INTRAVENOUS EVERY 6 HOURS
Status: DISCONTINUED | OUTPATIENT
Start: 2025-06-27 | End: 2025-06-27

## 2025-06-27 RX ORDER — ALPRAZOLAM 0.25 MG
0.25 TABLET ORAL 2 TIMES DAILY PRN
Status: DISCONTINUED | OUTPATIENT
Start: 2025-06-27 | End: 2025-06-30 | Stop reason: HOSPADM

## 2025-06-27 RX ORDER — KETOROLAC TROMETHAMINE 30 MG/ML
30 INJECTION, SOLUTION INTRAMUSCULAR; INTRAVENOUS EVERY 6 HOURS
Status: COMPLETED | OUTPATIENT
Start: 2025-06-27 | End: 2025-06-28

## 2025-06-27 RX ORDER — PANTOPRAZOLE SODIUM 40 MG/1
40 TABLET, DELAYED RELEASE ORAL
Status: DISCONTINUED | OUTPATIENT
Start: 2025-06-28 | End: 2025-06-30 | Stop reason: HOSPADM

## 2025-06-27 RX ADMIN — Medication 400 MG: at 08:22

## 2025-06-27 RX ADMIN — ASPIRIN 81 MG: 81 TABLET, DELAYED RELEASE ORAL at 08:21

## 2025-06-27 RX ADMIN — AMIODARONE HYDROCHLORIDE 400 MG: 200 TABLET ORAL at 08:22

## 2025-06-27 RX ADMIN — SODIUM CHLORIDE, PRESERVATIVE FREE 10 ML: 5 INJECTION INTRAVENOUS at 08:13

## 2025-06-27 RX ADMIN — AMIODARONE HYDROCHLORIDE 400 MG: 200 TABLET ORAL at 20:56

## 2025-06-27 RX ADMIN — DOCUSATE SODIUM 50MG AND SENNOSIDES 8.6MG 1 TABLET: 8.6; 5 TABLET, FILM COATED ORAL at 20:56

## 2025-06-27 RX ADMIN — OXYCODONE 10 MG: 5 TABLET ORAL at 13:13

## 2025-06-27 RX ADMIN — ACETAMINOPHEN 1000 MG: 500 TABLET, FILM COATED ORAL at 06:35

## 2025-06-27 RX ADMIN — CEFAZOLIN 3000 MG: 10 INJECTION, POWDER, FOR SOLUTION INTRAVENOUS at 08:16

## 2025-06-27 RX ADMIN — NICARDIPINE HYDROCHLORIDE 12.5 MG/HR: 25 INJECTION INTRAVENOUS at 03:16

## 2025-06-27 RX ADMIN — OXYCODONE 10 MG: 5 TABLET ORAL at 06:35

## 2025-06-27 RX ADMIN — POLYETHYLENE GLYCOL 3350 17 G: 17 POWDER, FOR SOLUTION ORAL at 08:09

## 2025-06-27 RX ADMIN — 0.12% CHLORHEXIDINE GLUCONATE 15 ML: 1.2 RINSE ORAL at 08:09

## 2025-06-27 RX ADMIN — CEFAZOLIN 3000 MG: 10 INJECTION, POWDER, FOR SOLUTION INTRAVENOUS at 23:26

## 2025-06-27 RX ADMIN — KETOROLAC TROMETHAMINE 30 MG: 30 INJECTION, SOLUTION INTRAMUSCULAR at 20:14

## 2025-06-27 RX ADMIN — METHOCARBAMOL TABLETS 750 MG: 750 TABLET, COATED ORAL at 20:57

## 2025-06-27 RX ADMIN — 0.12% CHLORHEXIDINE GLUCONATE 15 ML: 1.2 RINSE ORAL at 21:00

## 2025-06-27 RX ADMIN — METOPROLOL TARTRATE 12.5 MG: 25 TABLET, FILM COATED ORAL at 08:44

## 2025-06-27 RX ADMIN — ALLOPURINOL 300 MG: 300 TABLET ORAL at 08:22

## 2025-06-27 RX ADMIN — HYDROMORPHONE HYDROCHLORIDE 0.5 MG: 1 INJECTION, SOLUTION INTRAMUSCULAR; INTRAVENOUS; SUBCUTANEOUS at 23:21

## 2025-06-27 RX ADMIN — MELATONIN 6 MG: at 22:20

## 2025-06-27 RX ADMIN — DOCUSATE SODIUM 50MG AND SENNOSIDES 8.6MG 1 TABLET: 8.6; 5 TABLET, FILM COATED ORAL at 08:22

## 2025-06-27 RX ADMIN — SODIUM CHLORIDE, PRESERVATIVE FREE 10 ML: 5 INJECTION INTRAVENOUS at 20:15

## 2025-06-27 RX ADMIN — ACETAMINOPHEN 1000 MG: 500 TABLET, FILM COATED ORAL at 23:18

## 2025-06-27 RX ADMIN — OXYCODONE 10 MG: 5 TABLET ORAL at 22:21

## 2025-06-27 RX ADMIN — ALPRAZOLAM 0.25 MG: 0.25 TABLET ORAL at 22:21

## 2025-06-27 RX ADMIN — METOPROLOL TARTRATE 12.5 MG: 25 TABLET, FILM COATED ORAL at 20:57

## 2025-06-27 RX ADMIN — CEFAZOLIN 3000 MG: 10 INJECTION, POWDER, FOR SOLUTION INTRAVENOUS at 16:59

## 2025-06-27 RX ADMIN — NICARDIPINE HYDROCHLORIDE 15 MG/HR: 25 INJECTION INTRAVENOUS at 06:45

## 2025-06-27 RX ADMIN — SODIUM CHLORIDE 6.2 UNITS/HR: 9 INJECTION, SOLUTION INTRAVENOUS at 02:26

## 2025-06-27 RX ADMIN — KETOROLAC TROMETHAMINE 30 MG: 30 INJECTION, SOLUTION INTRAMUSCULAR at 13:03

## 2025-06-27 RX ADMIN — MUPIROCIN: 20 OINTMENT TOPICAL at 21:00

## 2025-06-27 RX ADMIN — HYDROMORPHONE HYDROCHLORIDE 1 MG: 1 INJECTION, SOLUTION INTRAMUSCULAR; INTRAVENOUS; SUBCUTANEOUS at 08:16

## 2025-06-27 RX ADMIN — SODIUM CHLORIDE 8.8 UNITS/HR: 9 INJECTION, SOLUTION INTRAVENOUS at 15:10

## 2025-06-27 RX ADMIN — NICARDIPINE HYDROCHLORIDE 15 MG/HR: 25 INJECTION INTRAVENOUS at 05:09

## 2025-06-27 RX ADMIN — NICARDIPINE HYDROCHLORIDE 12.5 MG/HR: 25 INJECTION INTRAVENOUS at 09:14

## 2025-06-27 RX ADMIN — HYDROMORPHONE HYDROCHLORIDE 1 MG: 1 INJECTION, SOLUTION INTRAMUSCULAR; INTRAVENOUS; SUBCUTANEOUS at 04:09

## 2025-06-27 RX ADMIN — MUPIROCIN: 20 OINTMENT TOPICAL at 08:24

## 2025-06-27 RX ADMIN — METHOCARBAMOL TABLETS 750 MG: 750 TABLET, COATED ORAL at 08:21

## 2025-06-27 RX ADMIN — FAMOTIDINE 20 MG: 10 INJECTION, SOLUTION INTRAVENOUS at 08:10

## 2025-06-27 RX ADMIN — ATORVASTATIN CALCIUM 40 MG: 40 TABLET, FILM COATED ORAL at 20:58

## 2025-06-27 RX ADMIN — ALPRAZOLAM 0.25 MG: 0.25 TABLET ORAL at 06:35

## 2025-06-27 RX ADMIN — Medication 400 MG: at 20:57

## 2025-06-27 RX ADMIN — OXYCODONE 10 MG: 5 TABLET ORAL at 18:38

## 2025-06-27 RX ADMIN — ACETAMINOPHEN 1000 MG: 500 TABLET, FILM COATED ORAL at 13:04

## 2025-06-27 RX ADMIN — METHOCARBAMOL TABLETS 750 MG: 750 TABLET, COATED ORAL at 13:03

## 2025-06-27 RX ADMIN — METHOCARBAMOL TABLETS 750 MG: 750 TABLET, COATED ORAL at 16:57

## 2025-06-27 ASSESSMENT — PAIN SCALES - GENERAL
PAINLEVEL_OUTOF10: 0
PAINLEVEL_OUTOF10: 0
PAINLEVEL_OUTOF10: 7
PAINLEVEL_OUTOF10: 5
PAINLEVEL_OUTOF10: 5
PAINLEVEL_OUTOF10: 2
PAINLEVEL_OUTOF10: 7
PAINLEVEL_OUTOF10: 3
PAINLEVEL_OUTOF10: 4
PAINLEVEL_OUTOF10: 4
PAINLEVEL_OUTOF10: 7
PAINLEVEL_OUTOF10: 4
PAINLEVEL_OUTOF10: 3
PAINLEVEL_OUTOF10: 7

## 2025-06-27 ASSESSMENT — PAIN DESCRIPTION - LOCATION
LOCATION: CHEST
LOCATION: CHEST
LOCATION: INCISION
LOCATION: INCISION
LOCATION: CHEST
LOCATION: INCISION

## 2025-06-27 ASSESSMENT — PAIN DESCRIPTION - ORIENTATION
ORIENTATION: ANTERIOR

## 2025-06-27 ASSESSMENT — PAIN DESCRIPTION - FREQUENCY
FREQUENCY: INTERMITTENT

## 2025-06-27 ASSESSMENT — PAIN - FUNCTIONAL ASSESSMENT
PAIN_FUNCTIONAL_ASSESSMENT: ACTIVITIES ARE NOT PREVENTED
PAIN_FUNCTIONAL_ASSESSMENT: PREVENTS OR INTERFERES SOME ACTIVE ACTIVITIES AND ADLS
PAIN_FUNCTIONAL_ASSESSMENT: PREVENTS OR INTERFERES SOME ACTIVE ACTIVITIES AND ADLS

## 2025-06-27 ASSESSMENT — PAIN DESCRIPTION - DESCRIPTORS
DESCRIPTORS: ACHING
DESCRIPTORS: SORE;SHARP
DESCRIPTORS: SORE
DESCRIPTORS: SORE

## 2025-06-27 ASSESSMENT — PAIN DESCRIPTION - PAIN TYPE
TYPE: SURGICAL PAIN
TYPE: ACUTE PAIN;SURGICAL PAIN
TYPE: SURGICAL PAIN
TYPE: SURGICAL PAIN
TYPE: ACUTE PAIN;SURGICAL PAIN

## 2025-06-27 ASSESSMENT — PAIN DESCRIPTION - ONSET
ONSET: ON-GOING

## 2025-06-27 NOTE — PLAN OF CARE
Problem: Occupational Therapy - Adult  Goal: By Discharge: Performs self-care activities at highest level of function for planned discharge setting.  See evaluation for individualized goals.  Description: FUNCTIONAL STATUS PRIOR TO ADMISSION:  He is full independent and working in IT.  HOME SUPPORT: Patient was living alone, but his fiance' and daughter will be with him after discharge.     Occupational Therapy Goals:  Initiated 6/27/2025  1.  Patient will perform grooming standing at sink with Taney within 7 day(s).  2.  Patient will perform upper body dressing with Set-up and Supervision within 7 day(s).  3.  Patient will perform lower body dressing with Set-up and Supervision within 7 day(s).  4.  Patient will perform toilet transfers with Supervision  within 7 day(s).  5.  Patient will perform all aspects of toileting with Supervision within 7 day(s).  6.  Patient will perform cleaning of sternal incision at sink or in shower with Set-up and Supervision within 7 day(s).     6/27/2025 1334 by Julius Jefferson, OTR/L  Outcome: Progressing   OCCUPATIONAL THERAPY EVALUATION    Patient: Miko Toney (49 y.o. male)  Date: 6/27/2025  Primary Diagnosis: CAD, multiple vessel [I25.10]  Procedure(s) (LRB):  ON PUMP CORONARY ARTERY BYPASS GRAFT (CABG) x4 WITH ENDOSCOPIC VEIN HARVESTING (EVH), WITH CARDIPLEGIA (NO PAC)(E.R.A.S.). INTRAOPERTIVE LUCINDA DONE BY DR. ARVIZU. EVH DONE BY JOEY CALLES (N/A) 1 Day Post-Op     Precautions:  (move in the tube)                  ASSESSMENT :  The patient is currently limited by mild GW, his post op move in the tube precautions, decreased activity tolerance, decreased safety awareness and mildly decreased balance which is impairing his functional independence. He is functioning below his independent baseline, now performing ADLs at an independent to max A level, he is min/mod A of 2 for bed mobility and is CGA for transfers and ambulation with a rollator. At this time the patient

## 2025-06-27 NOTE — PLAN OF CARE
Problem: Discharge Planning  Goal: Discharge to home or other facility with appropriate resources  6/27/2025 0933 by Reba Landaverde RN  Outcome: Progressing  Flowsheets  Taken 6/27/2025 0933  Discharge to home or other facility with appropriate resources:   Identify barriers to discharge with patient and caregiver   Arrange for needed discharge resources and transportation as appropriate   Identify discharge learning needs (meds, wound care, etc)   Arrange for interpreters to assist at discharge as needed  Taken 6/27/2025 0822  Discharge to home or other facility with appropriate resources:   Identify barriers to discharge with patient and caregiver   Arrange for needed discharge resources and transportation as appropriate   Identify discharge learning needs (meds, wound care, etc)  6/26/2025 2245 by Donn Rocha, RN  Outcome: Progressing  Flowsheets (Taken 6/26/2025 2000)  Discharge to home or other facility with appropriate resources:   Identify barriers to discharge with patient and caregiver   Identify discharge learning needs (meds, wound care, etc)   Refer to discharge planning if patient needs post-hospital services based on physician order or complex needs related to functional status, cognitive ability or social support system     Problem: ABCDS Injury Assessment  Goal: Absence of physical injury  6/27/2025 0933 by Reba Landaverde RN  Outcome: Progressing  Flowsheets  Taken 6/27/2025 0933  Absence of Physical Injury: Implement safety measures based on patient assessment  Taken 6/27/2025 0800  Absence of Physical Injury: Implement safety measures based on patient assessment  6/26/2025 2245 by Donn Rocha, RN  Outcome: Progressing     Problem: Skin/Tissue Integrity - Adult  Goal: Incisions, wounds, or drain sites healing without S/S of infection  6/27/2025 0933 by Reba Landaverde, RN  Outcome: Progressing  Flowsheets  Taken 6/27/2025 0933  Incisions, Wounds, or Drain Sites Healing Without Sign and

## 2025-06-27 NOTE — PROGRESS NOTES
[facility] ICUProgress Note    Admit Date: 2025  POD: 1 Day Post-Op      Procedure:  Procedure(s):  ON PUMP CORONARY ARTERY BYPASS GRAFT (CABG) x4 WITH ENDOSCOPIC VEIN HARVESTING (EVH), WITH CARDIPLEGIA (NO PAC)(E.R.A.S.). INTRAOPERTIVE LUCINDA DONE BY DR. ARVIZU. EVH DONE BY JOEY CALLES      Subjective/interval/overnight events:   Pt seen with Dr. Matt, up in the chair. Family at bedside In NSR, on 6 liters via nasal cannula, afebrile. Vital signs stable. Issues with pain control.     Remains on the following infusions: nicardipine (at 12.5) and insulin.    Objective:     /68   Pulse 82   Temp 97.7 °F (36.5 °C) (Oral)   Resp 28   Ht 1.829 m (6')   Wt 133.4 kg (294 lb)   SpO2 (!) 88%   BMI 39.87 kg/m²   Temp (24hrs), Av.7 °F (36.5 °C), Min:97.3 °F (36.3 °C), Max:98.2 °F (36.8 °C)      Last 24hr Input/Output:    Intake/Output Summary (Last 24 hours) at 2025 0919  Last data filed at 2025 0900  Gross per 24 hour   Intake 4864.97 ml   Output 2693 ml   Net 2171.97 ml        Chest Tube Output: 250 mL in 12 hours, 650 mL in 24 hours, Serosanguinous    EKG/Rhythm:   Encounter Date: 25   EKG 12 lead   Result Value    Ventricular Rate 87    Atrial Rate 87    P-R Interval 166    QRS Duration 98    Q-T Interval 382    QTc Calculation (Bazett) 459    P Axis 15    R Axis 15    T Axis 2    Diagnosis      Normal sinus rhythm  Nonspecific ST and T wave abnormality  Abnormal ECG  When compared with ECG of 2025 11:33,  MANUAL COMPARISON REQUIRED, DATA IS UNCONFIRMED         Oxygen: As above    CXR: Xray Result (most recent):  XR CHEST PORTABLE 2025    Narrative  EXAM:  XR CHEST PORTABLE    INDICATION: Post op open heart surgery    COMPARISON:     TECHNIQUE: portable chest AP view    FINDINGS:    Very shallow inspiration. Double-lumen catheter remains in the superior vena  cava, ET and NG tubes have been removed. Slight increased density at the left  lung base likely postsurgical with a  left chest tube in place no shift or  pneumothorax.    Impression  Removal of some support devices. Slight increased density left lung  base.      Electronically signed by Sincere Sy MD          Admission Weight: Last Weight   Weight - Scale: 132.1 kg (291 lb 3.2 oz) Weight - Scale: 133.4 kg (294 lb)       EXAM:     General: awake and alert   Lungs:    diminished breath sounds bilaterally   Incision:  Incision clean, dry and intact, no erythema, drainage or swelling, and prineo intact   Heart:   Regular rate and rhythm and + rub   Abdomen:    soft, not-distended, non-tender and hypoactive bowel sounds   Extremities:  Trace edema BLE   Neurologic:  Gross motor and sensory apparatus intact       Lab Data Reviewed:   Recent Labs     06/26/25  1124 06/26/25  1509 06/27/25  0302   WBC 35.1*   < > 30.1*   HGB 12.3   < > 12.5   HCT 36.8   < > 38.7      < > 175      < > 138   K 4.7   < > 4.2   BUN 15   < > 15   INR 1.2*  --   --     < > = values in this interval not displayed.         Assessment:     Patient Active Problem List   Diagnosis    Chest pain    ADHD (attention deficit hyperactivity disorder)    Coronary artery disease without angina pectoris    Elevated troponin I level    Primary hypertension    Pre-diabetes    NSTEMI (non-ST elevated myocardial infarction) ([facility])    CAD, multiple vessel    ACS (acute coronary syndrome) ([facility])    Coronary artery disease    Preop cardiovascular exam    Bilateral carotid artery stenosis    S/P CABG x 4           Plan/Recommendations/Medical Decision Making:     CAD/NSTEMI s/p CABG: Continue ASA, statin. Start BB today to help wean off Cardene. Will need Plavix at dc for NSTEMI.  Continue amio for AF ppx.  Keep chest tubes and pacing wires today due to output. Diuretic plan as below.   Acute respiratory insufficiency in the post-op period secondary to atelectasis: on 6 liters via nasal cannula. OOB all meals, IS, ambulate and wean O2 when able to maintain sats >92%.

## 2025-06-27 NOTE — PROGRESS NOTES
Bedside shift change report given to Donn RN (oncoming nurse) by Omar RN (offgoing nurse). Report included the following information Nurse Handoff Report, Adult Overview, Surgery Report, Intake/Output, MAR, Recent Results, Cardiac Rhythm SR, and Event Log.      ~2000: Patient reports 7/10 pain with breathing. PRN hydromorphone previously administered at 1644 and oxycodone previously administered at 1813. Education provided to patient and spouse regarding pain level goals s/p surgery.     @2018: Notified SU Landis, ICU NP regarding patients pain, uncontrolled with current PRN regimen. Methocarbamol ordered. Melatonin additionally ordered for sleep. Also noted RADHA 0.4 with UOP > 30. No albumin to be administered per protocol.    ~2045: Patients spouse concerned about gabapentin, at current dose. Is okay with use through tonight and possibly tomorrow but would like to discuss with day providers. Patient has complaints about blood pressure cuff and is refusing leaving blood pressure cuff on continuously.    ~2340: Verified with SU Landis, ICU NP before starting ordered cardene gtt for sustained SBP > 140 even after PRN hydromorphone administered.     1117-7589: Patient SpO2 below 90%. Patient placed on home BiPAP with O2. Contacted RT. O2 Flow titrated from 2L to 6L. Contacted SU Landis, ICU NP ABG ordered and started on hospital BiPAP (vs home BiPAP). Diluadid dose raised from 0.5 mg to 1 mg by ICU NP.    8900-2610: Cardene titrated up overnight. See MAR. At 15 mg/hr at 0408. NIVBP taken at 0407 correlates with ABPs. Contacted SU Landis, ICU NP about max dose of cardene, and continuing to not be within hemodynamic goals. Hydralazine PRN parameters lowered from 160 to 140 SBP. Patient extremely anxious with mask off momentary for mouth moisturizer swab. PRN Xanax ordered. SBP at 130 with PRN diluadid administration and cardene at 15 mg/hr  after 20 minutes.    ~0515: Inquiry to Pharmacy regarding concentrating  mattress, Chair cushion, Elevate heels, Pillows, Repositioning, Other (comment) (Up with assistance)    Incision Care Completed This Shift: YES    CHG Bath Completed This Shift: NO    Pain Management:   Poor - Difficulty with sleeping/therapy     Patient Safety:  Fall Risk: Nursing Judgement-Fall Risk High(Add Comments): No  Fall Risk Interventions  Nursing Judgement-Fall Risk High(Add Comments): No  Toilet Every 2 Hours-In Advance of Need: Yes  Hourly Visual Checks: Awake, In chair, Eyes closed  Fall Visual Posted: Armband, Socks, Fall sign posted  Room Door Open: Yes  Alarm On: Bed, Chair  Patient Moved Closer to Nursing Station: No    Intake/Output    Past 24 hrs  In: 4455 [P.O.:525; I.V.:2677.1]  Out: 2788 [Urine:2138; Drains:650]     Net IO Since Admission: -2,095.78 mL [06/27/25 0744]      Last Weight Metrics:      6/26/2025     4:25 AM 6/25/2025     6:00 AM 6/24/2025     4:51 AM 6/23/2025     6:00 AM 6/22/2025     5:54 AM 6/21/2025     8:45 PM 6/21/2025     3:27 AM   Weight Loss Metrics   Height      6' 0\"    Weight - Scale 294 lbs 290 lbs 289 lbs 289 lbs 13 oz 291 lbs 3 oz 291 lbs 3 oz 298 lbs 8 oz   BMI (Calculated) 40 kg/m2 39.4 kg/m2 39.3 kg/m2 39.4 kg/m2 39.6 kg/m2 39.6 kg/m2 40.6 kg/m2       Weight change:      Accuracy of I/O Report Reviewed: YES    Drips:   Cardene    Number of Albumin Given: 0    Critical Lab Results:  Initial RADHA    Active Consults:   IP CONSULT TO CARDIOLOGY  IP CONSULT TO SOCIAL WORK  IP CONSULT TO CASE MANAGEMENT  IP CONSULT TO CARDIOVASCULAR SURGERY  IP CONSULT TO DIETITIAN  IP CONSULT TO SPIRITUAL CARE  IP CONSULT TO DIABETES MANAGEMENT  IP CONSULT TO CARDIAC REHAB  IP CONSULT TO CASE MANAGEMENT  IP CONSULT TO INTERNAL MEDICINE    Length of Stay:  Expected LOS: 5  Actual LOS: 6    Donn Rocha RN

## 2025-06-27 NOTE — PLAN OF CARE
Problem: Discharge Planning  Goal: Discharge to home or other facility with appropriate resources  Outcome: Progressing  Flowsheets (Taken 6/26/2025 2000)  Discharge to home or other facility with appropriate resources:   Identify barriers to discharge with patient and caregiver   Identify discharge learning needs (meds, wound care, etc)   Refer to discharge planning if patient needs post-hospital services based on physician order or complex needs related to functional status, cognitive ability or social support system     Problem: ABCDS Injury Assessment  Goal: Absence of physical injury  Outcome: Progressing     Problem: Cardiovascular - Adult  Goal: Maintains optimal cardiac output and hemodynamic stability  Outcome: Progressing  Flowsheets (Taken 6/26/2025 2000)  Maintains optimal cardiac output and hemodynamic stability:   Monitor blood pressure and heart rate   Monitor urine output and notify Licensed Independent Practitioner for values outside of normal range   Assess for signs of decreased cardiac output  Goal: Absence of cardiac dysrhythmias or at baseline  Outcome: Progressing  Flowsheets (Taken 6/26/2025 2000)  Absence of cardiac dysrhythmias or at baseline:   Monitor cardiac rate and rhythm   Assess for signs of decreased cardiac output     Problem: Skin/Tissue Integrity - Adult  Goal: Incisions, wounds, or drain sites healing without S/S of infection  Outcome: Progressing  Flowsheets (Taken 6/26/2025 2000)  Incisions, Wounds, or Drain Sites Healing Without Sign and Symptoms of Infection: ADMISSION and DAILY: Assess and document risk factors for pressure ulcer development     Problem: Gastrointestinal - Adult  Goal: Maintains or returns to baseline bowel function  Outcome: Progressing  Flowsheets (Taken 6/26/2025 2000)  Maintains or returns to baseline bowel function:   Assess bowel function   Encourage oral fluids to ensure adequate hydration   Administer IV fluids as ordered to ensure adequate

## 2025-06-27 NOTE — PROGRESS NOTES
[facility] End of Shift Note    Bedside shift change report given to JOHN Carty (oncoming nurse) by Kelvin Martinez RN (offgoing nurse).  Report included the following information SBAR, Kardex, Intake/Output, MAR, and Cardiac Rhythm      Shift worked:  0700 - 1900      Shift summary and any significant changes:     Off Cardene  Delined  Walked in the danielson with PT/OT     Concerns for physician to address:          Activity:  Level of Assistance: Minimal assist, patient does 75% or more  Number times ambulated in hallways past shift: 1  Number of times OOB to chair past shift: 1    Neurologic:  Level of Consciousness: Alert (0)  Orientation Level: Oriented X4  Cognition: Follows commands, Poor attention/concentration  Speech: Clear    Cardiac:   Cardiac Rhythm: Sinus rhythm    Pacer Wires: Pacer Wires: A-wires and V-wires           Pacer wires Capped?: YES      Pacer Wire Care Completed: YES    Respiratory:   O2 Device: Nasal cannula  ISS Teaching YES   Use : YES     Volume: 1750  Incentive Spirometry Tx  Treatment Effort: Needs encouragement  Predicted Volume: 2500  Maximum Achieved Volume (mL): 1250 mL  Number of Breaths: 10     Genitourinary:   Urinary Status: Draining, Gibson    GI:  Last BM (including prior to admit): 06/25/25  Current diet:  ADULT DIET; Full Liquid  ADULT ORAL NUTRITION SUPPLEMENT; Breakfast, Dinner; Low Calorie/High Protein Oral Supplement  Passing flatus: YES    Lines/Drains/Airway:  Peripheral IV and Gibson    Peripheral IV x 2: YES        Skin:    Wound Care Documentation:         Terry Scale Score: 21  Interventions: Wound Offloading (Prevention Methods): Pillows, Repositioning    Incision Care Completed This Shift: YES    CHG Bath Completed This Shift: YES    Pain Management:   Acceptable - Some pain at times, but the patient does not wish to increase their medications     Patient Safety:  Fall Risk: Nursing Judgement-Fall Risk High(Add Comments): No  Fall Risk Interventions  Nursing Judgement-Fall

## 2025-06-27 NOTE — PROGRESS NOTES
Pressure Support 5      POC PEEP/CPA 5.0      Source ARTERIAL      Site DRAWN FROM ARTERIAL LINE      Osvaldo Test NOT APPLICABLE     POCT Glucose    Collection Time: 06/26/25  3:06 PM   Result Value Ref Range    POC Glucose 142 (H) 65 - 117 mg/dL    Performed by: Jey Nelson    Comprehensive Metabolic Panel    Collection Time: 06/26/25  3:09 PM   Result Value Ref Range    Sodium 137 136 - 145 mmol/L    Potassium 4.9 3.5 - 5.1 mmol/L    Chloride 109 (H) 97 - 108 mmol/L    CO2 26 21 - 32 mmol/L    Anion Gap 2 2 - 12 mmol/L    Glucose 158 (H) 65 - 100 mg/dL    BUN 16 6 - 20 MG/DL    Creatinine 1.08 0.70 - 1.30 MG/DL    BUN/Creatinine Ratio 15 12 - 20      Est, Glom Filt Rate 84 >60 ml/min/1.73m2    Calcium 8.7 8.5 - 10.1 MG/DL    Total Bilirubin 0.8 0.2 - 1.0 MG/DL     (H) 12 - 78 U/L    AST 76 (H) 15 - 37 U/L    Alk Phosphatase 44 (L) 45 - 117 U/L    Total Protein 6.6 6.4 - 8.2 g/dL    Albumin 3.8 3.5 - 5.0 g/dL    Globulin 2.8 2.0 - 4.0 g/dL    Albumin/Globulin Ratio 1.4 1.1 - 2.2     CBC    Collection Time: 06/26/25  3:09 PM   Result Value Ref Range    WBC 29.6 (H) 4.1 - 11.1 K/uL    RBC 4.33 4.10 - 5.70 M/uL    Hemoglobin 13.0 12.1 - 17.0 g/dL    Hematocrit 39.0 36.6 - 50.3 %    MCV 90.1 80.0 - 99.0 FL    MCH 30.0 26.0 - 34.0 PG    MCHC 33.3 30.0 - 36.5 g/dL    RDW 13.1 11.5 - 14.5 %    Platelets 181 150 - 400 K/uL    MPV 11.2 8.9 - 12.9 FL    Nucleated RBCs 0.0 0  WBC    nRBC 0.00 0.00 - 0.01 K/uL   POCT Glucose    Collection Time: 06/26/25  4:05 PM   Result Value Ref Range    POC Glucose 138 (H) 65 - 117 mg/dL    Performed by: Jey Nelson    POCT Glucose    Collection Time: 06/26/25  5:25 PM   Result Value Ref Range    POC Glucose 141 (H) 65 - 117 mg/dL    Performed by: Jey Nelson    Acute Kidney Injury Biomarker (AKIB) Score    Collection Time: 06/26/25  6:09 PM   Result Value Ref Range    Acute Kidney Injury Risk Score 0.40 (HH) 0.00 - 0.30     POCT Glucose    Collection Time:     Collection Time: 06/27/25  3:02 AM   Result Value Ref Range    WBC 30.1 (H) 4.1 - 11.1 K/uL    RBC 4.25 4.10 - 5.70 M/uL    Hemoglobin 12.5 12.1 - 17.0 g/dL    Hematocrit 38.7 36.6 - 50.3 %    MCV 91.1 80.0 - 99.0 FL    MCH 29.4 26.0 - 34.0 PG    MCHC 32.3 30.0 - 36.5 g/dL    RDW 13.7 11.5 - 14.5 %    Platelets 175 150 - 400 K/uL    MPV 11.8 8.9 - 12.9 FL    Nucleated RBCs 0.0 0  WBC    nRBC 0.00 0.00 - 0.01 K/uL   Magnesium    Collection Time: 06/27/25  3:02 AM   Result Value Ref Range    Magnesium 2.1 1.6 - 2.4 mg/dL   Phosphorus    Collection Time: 06/27/25  3:02 AM   Result Value Ref Range    Phosphorus 3.5 2.6 - 4.7 MG/DL   Blood Gas, Arterial    Collection Time: 06/27/25  3:08 AM   Result Value Ref Range    pH, Arterial 7.35 7.35 - 7.45      pCO2, Arterial 41 35 - 45 mmHg    pO2, Arterial 87 80 - 100 mmHg    POC O2 SAT 96 92 - 97 %    HCO3, Arterial 22 22 - 26 mmol/L    Base deficit, arterial blood 3.2 mmol/L    O2 Method BIPAP      FIO2 Arterial 60 %    Mode BILEVEL      Set Rate, POC 14      POC Pressure Support 16      POC PEEP/CPA 8.0      Source ARTERIAL      Site DRAWN FROM ARTERIAL LINE      Osvaldo Test NOT APPLICABLE     Acute Kidney Injury Biomarker (AKIB) Score    Collection Time: 06/27/25  3:10 AM   Result Value Ref Range    Acute Kidney Injury Risk Score 0.26 0.00 - 0.30     POCT Glucose    Collection Time: 06/27/25  5:44 AM   Result Value Ref Range    POC Glucose 130 (H) 65 - 117 mg/dL    Performed by: Josefina Pop RN    EKG 12 lead    Collection Time: 06/27/25  5:54 AM   Result Value Ref Range    Ventricular Rate 87 BPM    Atrial Rate 87 BPM    P-R Interval 166 ms    QRS Duration 98 ms    Q-T Interval 382 ms    QTc Calculation (Bazett) 459 ms    P Axis 15 degrees    R Axis 15 degrees    T Axis 2 degrees    Diagnosis       Normal sinus rhythm  Nonspecific ST and T wave abnormality  Abnormal ECG  When compared with ECG of 26-JUN-2025 11:33,  MANUAL COMPARISON REQUIRED, DATA IS

## 2025-06-27 NOTE — PROGRESS NOTES
RT contacted by JOHN Carty about low SpO2 while patient on his home bipap w/ 2L bleed in. SpO2 87-89% on home bipap, flow titrated up to 6L and home device checked for leaks and adjusted, no leaks, and Pt not in distress and states he feels like his device is working well. Pt also taken off home device and placed on 4LNC and SpO2 still low at 89%, ABG ordered per RACHEL Landis, see results, and new orders for hospital device Bipap to be initiated.  Pt placed on hospital's Bipap at approximately 0115AM, Bipap settings of 16/8 FiO2 60% w/ back up rate of 14. Pt tolerating well, SpO2 increased to 94%.

## 2025-06-27 NOTE — CARE COORDINATION
Transition of Care Plan:    RUR: 15%  Prior Level of Functioning: Independent  Disposition: Home with HH   OSWALD: 6/30/25  If SNF,HH or IPR: Date FOC offered: 6/27/25  Date FOC received: 6/27/25  Accepting facility: N/A  Date authorization started with reference number: N/A  Date authorization received and expires: N/A  Follow up appointments: Specailist  DME needed: pending clinical progress  Transportation at discharge: Girlfriend to provide transportation  IM/IMM Medicare/ letter given: N/A - Moriah AVILA  Is patient a  and connected with VA? N/A   If yes, was  transfer form completed and VA notified?   Caregiver Contact: Lesteremeli (Spouse)                                    [phone] (Mobile)   Discharge Caregiver contacted prior to discharge?   CM to discuss d/c plan with pt  Care Conference needed? none  Barriers to discharge: CTS clearance      Updated note - 2:28pm    Cm set mass referral to agencies for HH services- seeking a agency in network with pt insurance.      Initial Note:  CM reviewed pt chart. Pt not medically stable for d/c at this time.    CM will continue to follow pt for D/C planning and arrange services as deemed neccessary      SHERINE Gonzalez,RN  Care   [facility]  Phone: [phone]

## 2025-06-27 NOTE — PLAN OF CARE
Problem: Physical Therapy - Adult  Goal: By Discharge: Performs mobility at highest level of function for planned discharge setting.  See evaluation for individualized goals.  Description: FUNCTIONAL STATUS PRIOR TO ADMISSION: Patient was independent and active without use of DME.    HOME SUPPORT PRIOR TO ADMISSION: The patient lived alone with nupur to provide assistance.Will have 24/7 from nupur and daughter/nupur's daughter.    Physical Therapy Goals  Initiated 6/27/2025  1.  Patient will move from supine to sit and sit to supine, scoot up and down, and roll side to side in bed with modified independence within 5 day(s).    2.  Patient will perform sit to stand with modified independence within 5 day(s).  3.  Patient will transfer from bed to chair and chair to bed with modified independence using the least restrictive device within 5 day(s).  4.  Patient will ambulate with modified independence for 200 feet with the least restrictive device within 5 day(s).   5.  Patient will ascend/descend 1 stairs without handrail(s) with supervision/set-up within 5 day(s).  6.  Patient will perform cardiac exercises per protocol with independence within 5 days.  7.  Patient will verbally recall and functionally demonstrate mindful-based movements (\"move in the tube\") principles without cues within 5 days.  Outcome: Progressing     PHYSICAL THERAPY EVALUATION    Patient: Miko Toney (49 y.o. male)  Date: 6/27/2025  Primary Diagnosis: CAD, multiple vessel [I25.10]  Procedure(s) (LRB):  ON PUMP CORONARY ARTERY BYPASS GRAFT (CABG) x4 WITH ENDOSCOPIC VEIN HARVESTING (EVH), WITH CARDIPLEGIA (NO PAC)(E.R.A.S.). INTRAOPERTIVE LUCINDA DONE BY DR. ARVIZU. EVH DONE BY JOEY CALLES (N/A) 1 Day Post-Op   Precautions: Restrictions/Precautions  Restrictions/Precautions:  (move in the tube)          ASSESSMENT :   DEFICITS/IMPAIRMENTS:   The patient is limited by decreased functional mobility, independence in ADLs, high-level IADLs,

## 2025-06-28 ENCOUNTER — APPOINTMENT (OUTPATIENT)
Facility: HOSPITAL | Age: 50
DRG: 280 | End: 2025-06-28
Attending: STUDENT IN AN ORGANIZED HEALTH CARE EDUCATION/TRAINING PROGRAM
Payer: COMMERCIAL

## 2025-06-28 LAB
ANION GAP SERPL CALC-SCNC: 6 MMOL/L (ref 2–12)
BUN SERPL-MCNC: 23 MG/DL (ref 6–20)
BUN/CREAT SERPL: 19 (ref 12–20)
CALCIUM SERPL-MCNC: 8.5 MG/DL (ref 8.5–10.1)
CHLORIDE SERPL-SCNC: 102 MMOL/L (ref 97–108)
CO2 SERPL-SCNC: 28 MMOL/L (ref 21–32)
CREAT SERPL-MCNC: 1.19 MG/DL (ref 0.7–1.3)
EKG ATRIAL RATE: 87 BPM
EKG DIAGNOSIS: NORMAL
EKG P AXIS: 15 DEGREES
EKG P-R INTERVAL: 166 MS
EKG Q-T INTERVAL: 382 MS
EKG QRS DURATION: 98 MS
EKG QTC CALCULATION (BAZETT): 459 MS
EKG R AXIS: 15 DEGREES
EKG T AXIS: 2 DEGREES
EKG VENTRICULAR RATE: 87 BPM
ERYTHROCYTE [DISTWIDTH] IN BLOOD BY AUTOMATED COUNT: 13.8 % (ref 11.5–14.5)
GLUCOSE BLD STRIP.AUTO-MCNC: 104 MG/DL (ref 65–117)
GLUCOSE BLD STRIP.AUTO-MCNC: 107 MG/DL (ref 65–117)
GLUCOSE BLD STRIP.AUTO-MCNC: 123 MG/DL (ref 65–117)
GLUCOSE BLD STRIP.AUTO-MCNC: 133 MG/DL (ref 65–117)
GLUCOSE BLD STRIP.AUTO-MCNC: 135 MG/DL (ref 65–117)
GLUCOSE BLD STRIP.AUTO-MCNC: 140 MG/DL (ref 65–117)
GLUCOSE BLD STRIP.AUTO-MCNC: 158 MG/DL (ref 65–117)
GLUCOSE BLD STRIP.AUTO-MCNC: 84 MG/DL (ref 65–117)
GLUCOSE BLD STRIP.AUTO-MCNC: 90 MG/DL (ref 65–117)
GLUCOSE BLD STRIP.AUTO-MCNC: 94 MG/DL (ref 65–117)
GLUCOSE SERPL-MCNC: 83 MG/DL (ref 65–100)
HCT VFR BLD AUTO: 33.3 % (ref 36.6–50.3)
HGB BLD-MCNC: 10.6 G/DL (ref 12.1–17)
MAGNESIUM SERPL-MCNC: 2.3 MG/DL (ref 1.6–2.4)
MCH RBC QN AUTO: 29.9 PG (ref 26–34)
MCHC RBC AUTO-ENTMCNC: 31.8 G/DL (ref 30–36.5)
MCV RBC AUTO: 94.1 FL (ref 80–99)
NRBC # BLD: 0 K/UL (ref 0–0.01)
NRBC BLD-RTO: 0 PER 100 WBC
PHOSPHATE SERPL-MCNC: 3.9 MG/DL (ref 2.6–4.7)
PLATELET # BLD AUTO: 143 K/UL (ref 150–400)
PMV BLD AUTO: 11.8 FL (ref 8.9–12.9)
POTASSIUM SERPL-SCNC: 4.9 MMOL/L (ref 3.5–5.1)
RBC # BLD AUTO: 3.54 M/UL (ref 4.1–5.7)
SERVICE CMNT-IMP: ABNORMAL
SERVICE CMNT-IMP: NORMAL
SODIUM SERPL-SCNC: 136 MMOL/L (ref 136–145)
WBC # BLD AUTO: 25.1 K/UL (ref 4.1–11.1)

## 2025-06-28 PROCEDURE — 6370000000 HC RX 637 (ALT 250 FOR IP)

## 2025-06-28 PROCEDURE — 97110 THERAPEUTIC EXERCISES: CPT

## 2025-06-28 PROCEDURE — 2700000000 HC OXYGEN THERAPY PER DAY

## 2025-06-28 PROCEDURE — 83735 ASSAY OF MAGNESIUM: CPT

## 2025-06-28 PROCEDURE — 6360000002 HC RX W HCPCS

## 2025-06-28 PROCEDURE — 36415 COLL VENOUS BLD VENIPUNCTURE: CPT

## 2025-06-28 PROCEDURE — 71045 X-RAY EXAM CHEST 1 VIEW: CPT

## 2025-06-28 PROCEDURE — 97116 GAIT TRAINING THERAPY: CPT

## 2025-06-28 PROCEDURE — 84100 ASSAY OF PHOSPHORUS: CPT

## 2025-06-28 PROCEDURE — 2500000003 HC RX 250 WO HCPCS

## 2025-06-28 PROCEDURE — 80048 BASIC METABOLIC PNL TOTAL CA: CPT

## 2025-06-28 PROCEDURE — 2580000003 HC RX 258

## 2025-06-28 PROCEDURE — 2060000000 HC ICU INTERMEDIATE R&B

## 2025-06-28 PROCEDURE — 6370000000 HC RX 637 (ALT 250 FOR IP): Performed by: NURSE PRACTITIONER

## 2025-06-28 PROCEDURE — 82962 GLUCOSE BLOOD TEST: CPT

## 2025-06-28 PROCEDURE — 85027 COMPLETE CBC AUTOMATED: CPT

## 2025-06-28 PROCEDURE — 94660 CPAP INITIATION&MGMT: CPT

## 2025-06-28 RX ORDER — SENNA AND DOCUSATE SODIUM 50; 8.6 MG/1; MG/1
2 TABLET, FILM COATED ORAL 2 TIMES DAILY
Status: DISCONTINUED | OUTPATIENT
Start: 2025-06-28 | End: 2025-06-30 | Stop reason: HOSPADM

## 2025-06-28 RX ORDER — INSULIN GLARGINE 100 [IU]/ML
1-50 INJECTION, SOLUTION SUBCUTANEOUS
Status: COMPLETED | OUTPATIENT
Start: 2025-06-28 | End: 2025-06-28

## 2025-06-28 RX ORDER — INSULIN LISPRO 100 [IU]/ML
1-20 INJECTION, SOLUTION INTRAVENOUS; SUBCUTANEOUS
Status: DISCONTINUED | OUTPATIENT
Start: 2025-06-28 | End: 2025-06-30

## 2025-06-28 RX ORDER — CALCIUM CARBONATE 500 MG/1
500 TABLET, CHEWABLE ORAL 3 TIMES DAILY PRN
Status: DISCONTINUED | OUTPATIENT
Start: 2025-06-28 | End: 2025-06-30 | Stop reason: HOSPADM

## 2025-06-28 RX ADMIN — METOPROLOL TARTRATE 12.5 MG: 25 TABLET, FILM COATED ORAL at 20:37

## 2025-06-28 RX ADMIN — METHOCARBAMOL TABLETS 750 MG: 750 TABLET, COATED ORAL at 08:28

## 2025-06-28 RX ADMIN — ACETAMINOPHEN 1000 MG: 500 TABLET, FILM COATED ORAL at 17:34

## 2025-06-28 RX ADMIN — Medication 400 MG: at 20:36

## 2025-06-28 RX ADMIN — POLYETHYLENE GLYCOL 3350 17 G: 17 POWDER, FOR SOLUTION ORAL at 08:28

## 2025-06-28 RX ADMIN — SODIUM CHLORIDE, PRESERVATIVE FREE 10 ML: 5 INJECTION INTRAVENOUS at 20:42

## 2025-06-28 RX ADMIN — PANTOPRAZOLE SODIUM 40 MG: 40 TABLET, DELAYED RELEASE ORAL at 06:48

## 2025-06-28 RX ADMIN — METHOCARBAMOL TABLETS 750 MG: 750 TABLET, COATED ORAL at 17:33

## 2025-06-28 RX ADMIN — MUPIROCIN: 20 OINTMENT TOPICAL at 20:30

## 2025-06-28 RX ADMIN — 0.12% CHLORHEXIDINE GLUCONATE 15 ML: 1.2 RINSE ORAL at 08:28

## 2025-06-28 RX ADMIN — ACETAMINOPHEN 1000 MG: 500 TABLET, FILM COATED ORAL at 06:48

## 2025-06-28 RX ADMIN — METHOCARBAMOL TABLETS 750 MG: 750 TABLET, COATED ORAL at 20:37

## 2025-06-28 RX ADMIN — 0.12% CHLORHEXIDINE GLUCONATE 15 ML: 1.2 RINSE ORAL at 20:39

## 2025-06-28 RX ADMIN — SODIUM CHLORIDE, PRESERVATIVE FREE 10 ML: 5 INJECTION INTRAVENOUS at 08:32

## 2025-06-28 RX ADMIN — INSULIN GLARGINE 20 UNITS: 100 INJECTION, SOLUTION SUBCUTANEOUS at 09:59

## 2025-06-28 RX ADMIN — ALLOPURINOL 300 MG: 300 TABLET ORAL at 08:27

## 2025-06-28 RX ADMIN — KETOROLAC TROMETHAMINE 30 MG: 30 INJECTION, SOLUTION INTRAMUSCULAR at 02:55

## 2025-06-28 RX ADMIN — INSULIN LISPRO 2 UNITS: 100 INJECTION, SOLUTION INTRAVENOUS; SUBCUTANEOUS at 13:09

## 2025-06-28 RX ADMIN — AMIODARONE HYDROCHLORIDE 400 MG: 200 TABLET ORAL at 20:36

## 2025-06-28 RX ADMIN — ACETAMINOPHEN 1000 MG: 500 TABLET, FILM COATED ORAL at 11:07

## 2025-06-28 RX ADMIN — DOCUSATE SODIUM 50MG AND SENNOSIDES 8.6MG 2 TABLET: 8.6; 5 TABLET, FILM COATED ORAL at 20:36

## 2025-06-28 RX ADMIN — INSULIN LISPRO 1 UNITS: 100 INJECTION, SOLUTION INTRAVENOUS; SUBCUTANEOUS at 20:40

## 2025-06-28 RX ADMIN — KETOROLAC TROMETHAMINE 30 MG: 30 INJECTION, SOLUTION INTRAMUSCULAR at 08:25

## 2025-06-28 RX ADMIN — SODIUM CHLORIDE 7.5 UNITS/HR: 9 INJECTION, SOLUTION INTRAVENOUS at 00:58

## 2025-06-28 RX ADMIN — ASPIRIN 81 MG: 81 TABLET, DELAYED RELEASE ORAL at 08:27

## 2025-06-28 RX ADMIN — CALCIUM CARBONATE (ANTACID) CHEW TAB 500 MG 500 MG: 500 CHEW TAB at 17:34

## 2025-06-28 RX ADMIN — Medication 400 MG: at 08:28

## 2025-06-28 RX ADMIN — MUPIROCIN: 20 OINTMENT TOPICAL at 08:31

## 2025-06-28 RX ADMIN — METHOCARBAMOL TABLETS 750 MG: 750 TABLET, COATED ORAL at 13:09

## 2025-06-28 RX ADMIN — AMIODARONE HYDROCHLORIDE 400 MG: 200 TABLET ORAL at 08:27

## 2025-06-28 RX ADMIN — DOCUSATE SODIUM 50MG AND SENNOSIDES 8.6MG 2 TABLET: 8.6; 5 TABLET, FILM COATED ORAL at 08:27

## 2025-06-28 RX ADMIN — ATORVASTATIN CALCIUM 40 MG: 40 TABLET, FILM COATED ORAL at 20:37

## 2025-06-28 RX ADMIN — METOPROLOL TARTRATE 12.5 MG: 25 TABLET, FILM COATED ORAL at 08:28

## 2025-06-28 ASSESSMENT — PAIN SCALES - GENERAL: PAINLEVEL_OUTOF10: 0

## 2025-06-28 NOTE — PROGRESS NOTES
[facility] End of Shift Note    Bedside shift change report given to Carloz WALKER RN (oncoming nurse) by Donn Rocha RN (offgoing nurse).  Report included the following information SBAR, Procedure Summary, Intake/Output, MAR, Accordion, Recent Results, and Cardiac Rhythm SR    Shift worked:  7p-7a     Shift summary and any significant changes:     No acute events overnight     Concerns for physician to address:  N/A       Activity:  Level of Assistance: Minimal assist, patient does 75% or more  Number times ambulated in hallways past shift: 0  Number of times OOB to chair past shift: 2    Neurologic:  Level of Consciousness: Alert (0)  Orientation Level: Oriented X4  Cognition: Follows commands, Appropriate attention/concentration, Appropriate safety awareness  Speech: Clear    Cardiac:   Cardiac Rhythm: Sinus rhythm    Pacer Wires: Pacer Wires: A-wires and V-wires           Pacer wires Capped?: NO      Pacer Wire Care Completed: YES    Respiratory:   O2 Device: Nasal cannula  ISS Teaching YES   Use : YES  Incentive Spirometry Tx  Treatment Effort: Independent  Predicted Volume: 2500  Maximum Achieved Volume (mL): 1250 mL  Number of Breaths: 10     Genitourinary:   Urinary Status: Draining, Gibson    GI:  Last BM (including prior to admit): 06/25/25  Current diet:  ADULT ORAL NUTRITION SUPPLEMENT; Breakfast, Dinner; Low Calorie/High Protein Oral Supplement  ADULT DIET; Regular; Low Fat/Low Chol/High Fiber/MAX; No Concentrated sweets  Passing flatus: YES    Lines/Drains/Airway:  Peripheral IV and Gibson    Peripheral IV x 2: YES        Skin:    Wound Care Documentation:         Terry Scale Score: 21  Interventions: Wound Offloading (Prevention Methods): Bed, pressure reduction mattress, Pillows, Repositioning    Incision Care Completed This Shift: YES    CHG Bath Completed This Shift: NO    Pain Management:   Acceptable - Some pain at times, but the patient does not wish to increase their medications     Patient  Safety:  Fall Risk: Nursing Judgement-Fall Risk High(Add Comments): No  Fall Risk Interventions  Nursing Judgement-Fall Risk High(Add Comments): No  Toilet Every 2 Hours-In Advance of Need: Yes  Hourly Visual Checks: Awake  Fall Visual Posted: Armband, Socks, Fall sign posted  Room Door Open: Yes  Alarm On: Bed, Chair  Patient Moved Closer to Nursing Station: No    Intake/Output    Past 24 hrs  In: 2420 [P.O.:1500; I.V.:620.1]  Out: 1150 [Urine:960; Drains:190]     Net IO Since Admission: -966.98 mL [06/28/25 0751]      Last Weight Metrics:      6/28/2025     6:45 AM 6/26/2025     4:25 AM 6/25/2025     6:00 AM 6/24/2025     4:51 AM 6/23/2025     6:00 AM 6/22/2025     5:54 AM 6/21/2025     8:45 PM   Weight Loss Metrics   Height       6' 0\"   Weight - Scale 305 lbs 14 oz 294 lbs 290 lbs 289 lbs 289 lbs 13 oz 291 lbs 3 oz 291 lbs 3 oz   BMI (Calculated) 41.6 kg/m2 40 kg/m2 39.4 kg/m2 39.3 kg/m2 39.4 kg/m2 39.6 kg/m2 39.6 kg/m2       Weight change:      Accuracy of I/O Report Reviewed: YES    Drips:   Insulin    Number of Albumin Given: 0    Critical Lab Results:  None    Active Consults:   IP CONSULT TO CARDIOLOGY  IP CONSULT TO SOCIAL WORK  IP CONSULT TO CASE MANAGEMENT  IP CONSULT TO CARDIOVASCULAR SURGERY  IP CONSULT TO DIETITIAN  IP CONSULT TO SPIRITUAL CARE  IP CONSULT TO DIABETES MANAGEMENT  IP CONSULT TO CARDIAC REHAB  IP CONSULT TO CASE MANAGEMENT  IP CONSULT TO INTERNAL MEDICINE  IP CONSULT TO PHARMACY  IP CONSULT HOME HEALTH    Length of Stay:  Expected LOS: 9  Actual LOS: 7    Donn Rocha RN

## 2025-06-28 NOTE — PLAN OF CARE
Problem: Discharge Planning  Goal: Discharge to home or other facility with appropriate resources  Outcome: Progressing  Flowsheets (Taken 6/27/2025 2000)  Discharge to home or other facility with appropriate resources:   Identify barriers to discharge with patient and caregiver   Identify discharge learning needs (meds, wound care, etc)   Refer to discharge planning if patient needs post-hospital services based on physician order or complex needs related to functional status, cognitive ability or social support system     Problem: ABCDS Injury Assessment  Goal: Absence of physical injury  Outcome: Progressing     Problem: Cardiovascular - Adult  Goal: Maintains optimal cardiac output and hemodynamic stability  Outcome: Progressing  Flowsheets (Taken 6/27/2025 2000)  Maintains optimal cardiac output and hemodynamic stability:   Monitor blood pressure and heart rate   Monitor urine output and notify Licensed Independent Practitioner for values outside of normal range  Goal: Absence of cardiac dysrhythmias or at baseline  Outcome: Progressing  Flowsheets (Taken 6/27/2025 2000)  Absence of cardiac dysrhythmias or at baseline:   Monitor cardiac rate and rhythm   Assess for signs of decreased cardiac output     Problem: Skin/Tissue Integrity - Adult  Goal: Incisions, wounds, or drain sites healing without S/S of infection  Outcome: Progressing  Goal: Skin integrity remains intact  Description: 1.  Monitor for areas of redness and/or skin breakdown  2.  Assess vascular access sites hourly  3.  Every 4-6 hours minimum:  Change oxygen saturation probe site  4.  Every 4-6 hours:  If on nasal continuous positive airway pressure, respiratory therapy assess nares and determine need for appliance change or resting period  Outcome: Progressing  Flowsheets (Taken 6/27/2025 2000)  Skin Integrity Remains Intact: Monitor for areas of redness and/or skin breakdown     Problem: Gastrointestinal - Adult  Goal: Maintains or returns to

## 2025-06-28 NOTE — PROGRESS NOTES
0700. Bedside shift change report given to Carloz RN (oncoming nurse) by Carloz RN (offgoing nurse). Report included the following information Nurse Handoff Report, Index, Intake/Output, MAR, Recent Results, and Cardiac Rhythm NSR.     1000. 20U lantus given based on I/O intake of 20U Regular insulin since 0400    1400 Sternal care completed and bathed    1900. End of Shift Note    Bedside shift change report given to Carloz RN (oncoming nurse) by Donn Lujan RN (offgoing nurse).  Report included the following information SBAR, Kardex, Intake/Output, MAR, Recent Results, and Cardiac Rhythm NSR    Shift worked:  7233-5842     Shift summary and any significant changes:     Patient ambriz out, pacer wires pulled, and chest tubes removed. Insulin gtt discontinued. Patient walked in danielson x2 declined third walk for exhaustion. RLE seems warm, edematous, and red. RLE Duplex ordered      Concerns for physician to address:       Zone phone for oncoming shift:          Activity:  Level of Assistance: Minimal assist, patient does 75% or more  Number times ambulated in hallways past shift: 2  Number of times OOB to chair past shift: 5    Cardiac:   Cardiac Monitoring: Yes      Cardiac Rhythm: Sinus rhythm    Access:  Current line(s): PIV     Genitourinary:   Urinary Status: Draining, Ambriz    Respiratory:   O2 Device: Nasal cannula  Chronic home O2 use?: NO  Incentive spirometer at bedside: YES    GI:  Last BM (including prior to admit): 06/28/25  Current diet:  ADULT ORAL NUTRITION SUPPLEMENT; Breakfast, Dinner; Low Calorie/High Protein Oral Supplement  ADULT DIET; Regular; Low Fat/Low Chol/High Fiber/MAX; No Concentrated sweets  Passing flatus: YES    Pain Management:   Patient states pain is manageable on current regimen: YES    Skin:  Terry Scale Score: 21  Interventions: Wound Offloading (Prevention Methods): Bed, pressure reduction mattress, Elevate heels, Pillows, Repositioning    Patient Safety:  Fall Risk: Nursing

## 2025-06-28 NOTE — PROCEDURES
PROCEDURE NOTE  Date: 6/28/2025   Name: Miko Toney  YOB: 1975    Procedures    Pt assisted back to bed by RN. AV wire site cleansed with CHG prep. A and V wires removed without issue. Q15min vitals. RN updated. VSS.     Louie drain dressing removed. Sites cleansed with CHG.  CT x3  removed without difficulty. Vaseline gauze and 4x4 dressing applied. VSS. RN updated.     ANDRY Dixon - NP

## 2025-06-28 NOTE — PROGRESS NOTES
CSS Progress Note    Admit Date: 2025  POD: 2 Days Post-Op      Procedure:  Procedure(s):  ON PUMP CORONARY ARTERY BYPASS GRAFT (CABG) x4 WITH ENDOSCOPIC VEIN HARVESTING (EVH), WITH CARDIPLEGIA (NO PAC)(E.R.A.S.). INTRAOPERTIVE LUCINDA DONE BY DR. ARVIZU. EVH DONE BY JOEY CALLES      Subjective/interval/overnight events:   Pt walking in the hallway with therapy. Feeling well. Denies complaints.     On 5L NC. Afebrile. Insulin gtt    Objective:     BP (!) 104/59   Pulse 66   Temp 97.7 °F (36.5 °C) (Oral)   Resp 18   Ht 1.829 m (6')   Wt (!) 138.8 kg (305 lb 14.4 oz)   SpO2 96%   BMI 41.49 kg/m²   Temp (24hrs), Av.1 °F (36.7 °C), Min:97.7 °F (36.5 °C), Max:98.3 °F (36.8 °C)      Last 24hr Input/Output:    Intake/Output Summary (Last 24 hours) at 2025 0959  Last data filed at 2025 0949  Gross per 24 hour   Intake 1778.27 ml   Output 1025 ml   Net 753.27 ml        Chest Tube Output: 190mL ( 80mL last 12 hrs)    EKG/Rhythm: NSR 60-70s    CXR: Xray Result (most recent):  XR CHEST PORTABLE 2025    Narrative  EXAM:  XR CHEST PORTABLE    INDICATION: Post op open heart surgery    COMPARISON:     TECHNIQUE: portable chest AP view    FINDINGS:    Very shallow inspiration. Double-lumen catheter remains in the superior vena  cava, ET and NG tubes have been removed. Slight increased density at the left  lung base likely postsurgical with a left chest tube in place no shift or  pneumothorax.    Impression  Removal of some support devices. Slight increased density left lung  base.      Electronically signed by Sincere Sy MD      Admission Weight: Last Weight   Weight - Scale: 132.1 kg (291 lb 3.2 oz) Weight - Scale: (!) 138.8 kg (305 lb 14.4 oz)       EXAM:     General: awake and alert   Lungs:    diminished breath sounds bilaterally   Incision:  Incision clean, dry and intact, no erythema, drainage or swelling, and prineo intact   Heart:   Regular rate and rhythm and + rub   Abdomen:    soft,

## 2025-06-28 NOTE — PLAN OF CARE
Problem: Physical Therapy - Adult  Goal: By Discharge: Performs mobility at highest level of function for planned discharge setting.  See evaluation for individualized goals.  Description: FUNCTIONAL STATUS PRIOR TO ADMISSION: Patient was independent and active without use of DME.    HOME SUPPORT PRIOR TO ADMISSION: The patient lived alone with nupur to provide assistance.Will have 24/7 from nupur and daughter/nupur's daughter.    Physical Therapy Goals  Initiated 6/27/2025  1.  Patient will move from supine to sit and sit to supine, scoot up and down, and roll side to side in bed with modified independence within 5 day(s).    2.  Patient will perform sit to stand with modified independence within 5 day(s).  3.  Patient will transfer from bed to chair and chair to bed with modified independence using the least restrictive device within 5 day(s).  4.  Patient will ambulate with modified independence for 200 feet with the least restrictive device within 5 day(s).   5.  Patient will ascend/descend 1 stairs without handrail(s) with supervision/set-up within 5 day(s).  6.  Patient will perform cardiac exercises per protocol with independence within 5 days.  7.  Patient will verbally recall and functionally demonstrate mindful-based movements (\"move in the tube\") principles without cues within 5 days.  Outcome: Progressing     PHYSICAL THERAPY TREATMENT    Patient: Miko Toney (49 y.o. male)  Date: 6/28/2025  Diagnosis: CAD, multiple vessel [I25.10] CAD, multiple vessel  Procedure(s) (LRB):  ON PUMP CORONARY ARTERY BYPASS GRAFT (CABG) x4 WITH ENDOSCOPIC VEIN HARVESTING (EVH), WITH CARDIPLEGIA (NO PAC)(E.R.A.S.). INTRAOPERTIVE LUCINDA DONE BY DR. ARVIZU. EVH DONE BY JOEY CALLES (N/A) 2 Days Post-Op  Precautions: Restrictions/Precautions  Restrictions/Precautions:  (move in the tube)          ASSESSMENT:  Patient continues to benefit from skilled PT services and is progressing towards goals. Able to progress gait distance

## 2025-06-29 ENCOUNTER — APPOINTMENT (OUTPATIENT)
Facility: HOSPITAL | Age: 50
DRG: 280 | End: 2025-06-29
Attending: STUDENT IN AN ORGANIZED HEALTH CARE EDUCATION/TRAINING PROGRAM
Payer: COMMERCIAL

## 2025-06-29 LAB
ANION GAP SERPL CALC-SCNC: 4 MMOL/L (ref 2–12)
BUN SERPL-MCNC: 20 MG/DL (ref 6–20)
BUN/CREAT SERPL: 24 (ref 12–20)
CALCIUM SERPL-MCNC: 8.3 MG/DL (ref 8.5–10.1)
CHLORIDE SERPL-SCNC: 105 MMOL/L (ref 97–108)
CO2 SERPL-SCNC: 26 MMOL/L (ref 21–32)
CREAT SERPL-MCNC: 0.84 MG/DL (ref 0.7–1.3)
ECHO BSA: 2.59 M2
ERYTHROCYTE [DISTWIDTH] IN BLOOD BY AUTOMATED COUNT: 13.6 % (ref 11.5–14.5)
GLUCOSE BLD STRIP.AUTO-MCNC: 120 MG/DL (ref 65–117)
GLUCOSE BLD STRIP.AUTO-MCNC: 130 MG/DL (ref 65–117)
GLUCOSE BLD STRIP.AUTO-MCNC: 138 MG/DL (ref 65–117)
GLUCOSE BLD STRIP.AUTO-MCNC: 158 MG/DL (ref 65–117)
GLUCOSE SERPL-MCNC: 126 MG/DL (ref 65–100)
HCT VFR BLD AUTO: 30.6 % (ref 36.6–50.3)
HGB BLD-MCNC: 9.9 G/DL (ref 12.1–17)
MAGNESIUM SERPL-MCNC: 1.9 MG/DL (ref 1.6–2.4)
MCH RBC QN AUTO: 29.9 PG (ref 26–34)
MCHC RBC AUTO-ENTMCNC: 32.4 G/DL (ref 30–36.5)
MCV RBC AUTO: 92.4 FL (ref 80–99)
NRBC # BLD: 0 K/UL (ref 0–0.01)
NRBC BLD-RTO: 0 PER 100 WBC
PHOSPHATE SERPL-MCNC: 2.6 MG/DL (ref 2.6–4.7)
PLATELET # BLD AUTO: 137 K/UL (ref 150–400)
PMV BLD AUTO: 12.1 FL (ref 8.9–12.9)
POTASSIUM SERPL-SCNC: 4 MMOL/L (ref 3.5–5.1)
RBC # BLD AUTO: 3.31 M/UL (ref 4.1–5.7)
SERVICE CMNT-IMP: ABNORMAL
SODIUM SERPL-SCNC: 135 MMOL/L (ref 136–145)
WBC # BLD AUTO: 20.2 K/UL (ref 4.1–11.1)

## 2025-06-29 PROCEDURE — 6370000000 HC RX 637 (ALT 250 FOR IP)

## 2025-06-29 PROCEDURE — 97110 THERAPEUTIC EXERCISES: CPT

## 2025-06-29 PROCEDURE — 84100 ASSAY OF PHOSPHORUS: CPT

## 2025-06-29 PROCEDURE — 6370000000 HC RX 637 (ALT 250 FOR IP): Performed by: NURSE PRACTITIONER

## 2025-06-29 PROCEDURE — 94660 CPAP INITIATION&MGMT: CPT

## 2025-06-29 PROCEDURE — 6360000002 HC RX W HCPCS: Performed by: NURSE PRACTITIONER

## 2025-06-29 PROCEDURE — 2500000003 HC RX 250 WO HCPCS

## 2025-06-29 PROCEDURE — 82962 GLUCOSE BLOOD TEST: CPT

## 2025-06-29 PROCEDURE — 2060000000 HC ICU INTERMEDIATE R&B

## 2025-06-29 PROCEDURE — 80048 BASIC METABOLIC PNL TOTAL CA: CPT

## 2025-06-29 PROCEDURE — 97116 GAIT TRAINING THERAPY: CPT

## 2025-06-29 PROCEDURE — 36415 COLL VENOUS BLD VENIPUNCTURE: CPT

## 2025-06-29 PROCEDURE — 85027 COMPLETE CBC AUTOMATED: CPT

## 2025-06-29 PROCEDURE — 2700000000 HC OXYGEN THERAPY PER DAY

## 2025-06-29 PROCEDURE — 93971 EXTREMITY STUDY: CPT

## 2025-06-29 PROCEDURE — 83735 ASSAY OF MAGNESIUM: CPT

## 2025-06-29 PROCEDURE — 71045 X-RAY EXAM CHEST 1 VIEW: CPT

## 2025-06-29 RX ORDER — POTASSIUM CHLORIDE 1500 MG/1
20 TABLET, EXTENDED RELEASE ORAL ONCE
Status: COMPLETED | OUTPATIENT
Start: 2025-06-29 | End: 2025-06-29

## 2025-06-29 RX ORDER — POLYETHYLENE GLYCOL 3350 17 G/17G
17 POWDER, FOR SOLUTION ORAL 2 TIMES DAILY
Status: DISCONTINUED | OUTPATIENT
Start: 2025-06-29 | End: 2025-06-30 | Stop reason: HOSPADM

## 2025-06-29 RX ORDER — FUROSEMIDE 10 MG/ML
40 INJECTION INTRAMUSCULAR; INTRAVENOUS ONCE
Status: COMPLETED | OUTPATIENT
Start: 2025-06-29 | End: 2025-06-29

## 2025-06-29 RX ADMIN — ATORVASTATIN CALCIUM 40 MG: 40 TABLET, FILM COATED ORAL at 21:30

## 2025-06-29 RX ADMIN — PANTOPRAZOLE SODIUM 40 MG: 40 TABLET, DELAYED RELEASE ORAL at 06:18

## 2025-06-29 RX ADMIN — MUPIROCIN: 20 OINTMENT TOPICAL at 21:33

## 2025-06-29 RX ADMIN — SODIUM CHLORIDE, PRESERVATIVE FREE 5 ML: 5 INJECTION INTRAVENOUS at 21:36

## 2025-06-29 RX ADMIN — POTASSIUM CHLORIDE 20 MEQ: 1500 TABLET, EXTENDED RELEASE ORAL at 08:58

## 2025-06-29 RX ADMIN — 0.12% CHLORHEXIDINE GLUCONATE 15 ML: 1.2 RINSE ORAL at 21:33

## 2025-06-29 RX ADMIN — OXYCODONE 10 MG: 5 TABLET ORAL at 20:14

## 2025-06-29 RX ADMIN — INSULIN LISPRO 1 UNITS: 100 INJECTION, SOLUTION INTRAVENOUS; SUBCUTANEOUS at 09:34

## 2025-06-29 RX ADMIN — AMIODARONE HYDROCHLORIDE 400 MG: 200 TABLET ORAL at 08:58

## 2025-06-29 RX ADMIN — OXYCODONE 10 MG: 5 TABLET ORAL at 06:17

## 2025-06-29 RX ADMIN — METHOCARBAMOL TABLETS 750 MG: 750 TABLET, COATED ORAL at 21:30

## 2025-06-29 RX ADMIN — MAGNESIUM HYDROXIDE 30 ML: 400 SUSPENSION ORAL at 08:57

## 2025-06-29 RX ADMIN — Medication 400 MG: at 21:30

## 2025-06-29 RX ADMIN — DOCUSATE SODIUM 50MG AND SENNOSIDES 8.6MG 2 TABLET: 8.6; 5 TABLET, FILM COATED ORAL at 21:30

## 2025-06-29 RX ADMIN — METHOCARBAMOL TABLETS 750 MG: 750 TABLET, COATED ORAL at 13:57

## 2025-06-29 RX ADMIN — AMIODARONE HYDROCHLORIDE 400 MG: 200 TABLET ORAL at 21:30

## 2025-06-29 RX ADMIN — INSULIN LISPRO 2 UNITS: 100 INJECTION, SOLUTION INTRAVENOUS; SUBCUTANEOUS at 18:15

## 2025-06-29 RX ADMIN — INSULIN LISPRO 2 UNITS: 100 INJECTION, SOLUTION INTRAVENOUS; SUBCUTANEOUS at 13:56

## 2025-06-29 RX ADMIN — ACETAMINOPHEN 1000 MG: 500 TABLET, FILM COATED ORAL at 17:14

## 2025-06-29 RX ADMIN — 0.12% CHLORHEXIDINE GLUCONATE 15 ML: 1.2 RINSE ORAL at 09:03

## 2025-06-29 RX ADMIN — METHOCARBAMOL TABLETS 750 MG: 750 TABLET, COATED ORAL at 17:14

## 2025-06-29 RX ADMIN — OXYCODONE 5 MG: 5 TABLET ORAL at 11:36

## 2025-06-29 RX ADMIN — INSULIN LISPRO 1 UNITS: 100 INJECTION, SOLUTION INTRAVENOUS; SUBCUTANEOUS at 13:56

## 2025-06-29 RX ADMIN — Medication 400 MG: at 08:58

## 2025-06-29 RX ADMIN — MUPIROCIN: 20 OINTMENT TOPICAL at 09:01

## 2025-06-29 RX ADMIN — ASPIRIN 81 MG: 81 TABLET, DELAYED RELEASE ORAL at 08:58

## 2025-06-29 RX ADMIN — METHOCARBAMOL TABLETS 750 MG: 750 TABLET, COATED ORAL at 08:58

## 2025-06-29 RX ADMIN — POLYETHYLENE GLYCOL 3350 17 G: 17 POWDER, FOR SOLUTION ORAL at 09:01

## 2025-06-29 RX ADMIN — METOPROLOL TARTRATE 12.5 MG: 25 TABLET, FILM COATED ORAL at 21:30

## 2025-06-29 RX ADMIN — ACETAMINOPHEN 1000 MG: 500 TABLET, FILM COATED ORAL at 06:17

## 2025-06-29 RX ADMIN — ACETAMINOPHEN 1000 MG: 500 TABLET, FILM COATED ORAL at 11:34

## 2025-06-29 RX ADMIN — FUROSEMIDE 40 MG: 10 INJECTION, SOLUTION INTRAMUSCULAR; INTRAVENOUS at 09:01

## 2025-06-29 RX ADMIN — OXYCODONE 5 MG: 5 TABLET ORAL at 15:37

## 2025-06-29 RX ADMIN — SODIUM CHLORIDE, PRESERVATIVE FREE 10 ML: 5 INJECTION INTRAVENOUS at 09:01

## 2025-06-29 RX ADMIN — DOCUSATE SODIUM 50MG AND SENNOSIDES 8.6MG 2 TABLET: 8.6; 5 TABLET, FILM COATED ORAL at 09:01

## 2025-06-29 RX ADMIN — ALLOPURINOL 300 MG: 300 TABLET ORAL at 08:58

## 2025-06-29 RX ADMIN — BISACODYL 10 MG: 10 SUPPOSITORY RECTAL at 15:01

## 2025-06-29 ASSESSMENT — PAIN DESCRIPTION - DESCRIPTORS
DESCRIPTORS: ACHING

## 2025-06-29 ASSESSMENT — PAIN DESCRIPTION - LOCATION
LOCATION: INCISION
LOCATION: INCISION
LOCATION: OTHER (COMMENT)
LOCATION: OTHER (COMMENT)
LOCATION: INCISION
LOCATION: OTHER (COMMENT)
LOCATION: INCISION

## 2025-06-29 ASSESSMENT — PAIN DESCRIPTION - PAIN TYPE
TYPE: SURGICAL PAIN

## 2025-06-29 ASSESSMENT — PAIN DESCRIPTION - FREQUENCY
FREQUENCY: CONTINUOUS
FREQUENCY: INTERMITTENT

## 2025-06-29 ASSESSMENT — PAIN DESCRIPTION - ONSET
ONSET: ON-GOING

## 2025-06-29 ASSESSMENT — PAIN SCALES - GENERAL
PAINLEVEL_OUTOF10: 2
PAINLEVEL_OUTOF10: 4
PAINLEVEL_OUTOF10: 5
PAINLEVEL_OUTOF10: 9
PAINLEVEL_OUTOF10: 6
PAINLEVEL_OUTOF10: 4
PAINLEVEL_OUTOF10: 7
PAINLEVEL_OUTOF10: 4
PAINLEVEL_OUTOF10: 5
PAINLEVEL_OUTOF10: 2

## 2025-06-29 ASSESSMENT — PAIN DESCRIPTION - ORIENTATION
ORIENTATION: MID
ORIENTATION: MID
ORIENTATION: RIGHT;LEFT
ORIENTATION: RIGHT
ORIENTATION: MID

## 2025-06-29 ASSESSMENT — PAIN - FUNCTIONAL ASSESSMENT

## 2025-06-29 ASSESSMENT — PAIN DESCRIPTION - DIRECTION: RADIATING_TOWARDS: RIGHT

## 2025-06-29 NOTE — PROGRESS NOTES
End of Shift Note    Bedside shift change report given to Brad LOPEZ (oncoming nurse) by Stefanie Iniguez RN (offgoing nurse).  Report included the following information SBAR, Kardex, Procedure Summary, Intake/Output, MAR, Recent Results, and Cardiac Rhythm sinus rhythm with PACs/PVCs    Shift worked:  7a-7p     Shift summary and any significant changes:    Constipation--given Milk of Mag, prune juice, and dulcolax suppository.  Ambulated in danielson x 3 this shift.   Given Oxycodone 5 mg PO x 2 this shift for pain.  Continues to have intermittent ectopy PACs/PVCs.   Concerns for physician to address:  Continued irregular rhythm.  Continued constipation     Zone phone for oncoming shift:   [phone]       Activity:  Level of Assistance: Standby assist, set-up cues, supervision of patient - no hands on  Number times ambulated in hallways past shift: 3  Number of times OOB to chair past shift: 3    Cardiac:   Cardiac Monitoring: yes        Cardiac Rhythm: Sinus rhythm with PACs; intermittent afib lasting about 2 minutes    Access:  Current line(s): PIV     Genitourinary:   Urinary Status: Voiding    Respiratory:   O2 Device: Nasal cannula  Chronic home O2 use?: NO  Incentive spirometer at bedside: YES    GI:  Last BM (including prior to admit): 06/28/25  Current diet:  ADULT ORAL NUTRITION SUPPLEMENT; Breakfast, Dinner; Low Calorie/High Protein Oral Supplement  ADULT DIET; Regular; Low Fat/Low Chol/High Fiber/MAX; No Concentrated sweets  Passing flatus: YES    Pain Management:   Patient states pain is manageable on current regimen: YES    Skin:  Terry Scale Score: 20  Interventions: Wound Offloading (Prevention Methods): Elevate heels, Repositioning, Pillows    Patient Safety:  Fall Risk: Nursing Judgement-Fall Risk High(Add Comments): Yes  Fall Risk Interventions  Nursing Judgement-Fall Risk High(Add Comments): Yes  Toilet Every 2 Hours-In Advance of Need: Yes  Hourly Visual Checks: Awake, In chair  Fall Visual Posted:

## 2025-06-29 NOTE — PROGRESS NOTES
Offered Prune juice to help with bowel movement.  Patient refused.  Educated on need for increased ambulation after surgery for bowel regimen.

## 2025-06-29 NOTE — PROGRESS NOTES
Shift assessment.    Pain 2/10 at this time.     Instructed on increased use of incentive spirometer today.    Up in chair at this time.    On 4 liters per nasal cannula at this time.      Respirations not labored.

## 2025-06-29 NOTE — PROGRESS NOTES
Patient refused ambulation after breakfast.  \"Want to wait for my fiancee to get here.\"      Call light within reach.      Instructed patient has to walk twice during my shift.

## 2025-06-29 NOTE — PROGRESS NOTES
Returned to bed for ultrasound of leg as ordered.    CHG bath done prior to getting into bed.  Linen change.

## 2025-06-29 NOTE — PROGRESS NOTES
Continues to have frequent ectopy including PACs and PVCs.    On 2 liters per nasal cannula today.    Ambulated in the hallways twice this shift.    Has not had bowel movement yet but suppository given this shift and Milk of Magnesia.

## 2025-06-29 NOTE — PLAN OF CARE
Patient continues to have frequent PACs occasional PVC.  Patient states \"irregular rhythm\" makes him very tired.    Problem: Cardiovascular - Adult  Goal: Absence of cardiac dysrhythmias or at baseline  Outcome: Not Progressing  Flowsheets (Taken 6/29/2025 0810)  Absence of cardiac dysrhythmias or at baseline: Monitor cardiac rate and rhythm     Problem: Gastrointestinal - Adult  Goal: Maintains or returns to baseline bowel function  Outcome: Not Progressing  Flowsheets (Taken 6/29/2025 0810)  Maintains or returns to baseline bowel function: Assess bowel function     Has not had a bowel movement this shift.  Given suppository this afternoon as well as medications ordered this morning.    No nausea vomiting.    Oxygen has been titrated down to 2 liters per nasal cannula.      Dose Lasix given today.  Voided 1350 since given Lasix.

## 2025-06-29 NOTE — PROGRESS NOTES
Ambulated in the hallway with PT/OT.  On 2 liters per nasal cannula.  93% oxygen saturation during ambulation.    Family and patient updated on plan of care.

## 2025-06-29 NOTE — PROGRESS NOTES
Bedside shift change report given to Donn MCKEE RN (oncoming nurse) by Donn WALKER RN (offgoing nurse). Report included the following information Nurse Handoff Report, Adult Overview, Intake/Output, MAR, Recent Results, Cardiac Rhythm SR, and Event Log.     Patient reports exhaustion during bedside report. Declines 3rd ambulation at this time.     ~2100 Up to bathroom.  @2247 Brief, unsustained Afib.  @2254 Back in NSR    [facility] End of Shift Note    Bedside shift change report given to Stefanie LOPEZ (oncoming nurse) by Donn Rocha RN (offgoing nurse).  Report included the following information SBAR, Procedure Summary, Intake/Output, MAR, Recent Results, and Cardiac Rhythm SR w/ Unsustained runs of Afib    Shift worked:  7p-7a     Shift summary and any significant changes:     N/A     Concerns for physician to address:  N/A       Activity:  Level of Assistance: Moderate assist, patient does 50-74%  Number times ambulated in hallways past shift: 0 (refused 3rd ambulation)  Number of times OOB to chair past shift: 3    Neurologic:  Level of Consciousness: Alert (0)  Orientation Level: Oriented X4  Cognition: Follows commands, Appropriate for developmental age, Appropriate attention/concentration, Appropriate safety awareness, Appropriate judgement  Speech: Clear    Cardiac:   Cardiac Rhythm: Sinus rhythm    Pacer Wires: Pacer Wires: Removed          Respiratory:   O2 Device: (S) Nasal cannula  ISS Teaching YES   Use : YES     Volume: 2000  Incentive Spirometry Tx  Treatment Effort: Independent  Predicted Volume: 2500  Maximum Achieved Volume (mL): 2500 mL  Number of Breaths: 10     Genitourinary:   Urinary Status: Voiding, Bathroom privileges    GI:  Last BM (including prior to admit): 06/28/25  Current diet:  ADULT ORAL NUTRITION SUPPLEMENT; Breakfast, Dinner; Low Calorie/High Protein Oral Supplement  ADULT DIET; Regular; Low Fat/Low Chol/High Fiber/MAX; No Concentrated sweets  Passing flatus:

## 2025-06-29 NOTE — PROGRESS NOTES
Shift report received from Donn LOPEZ.  Patient up in chair at this time.  On 4 liters via nasal cannula.  Patient having intermittent atrial fibrillation last night.  Sinus rhythm at this time.  Patient states pain this morning worse than yesterday.  Cardiothoracic Surgery MARLI Colón notified of increased pain and intermittent atrial fibrillation episodes.  No new orders received at this time.

## 2025-06-29 NOTE — PROGRESS NOTES
CSS Progress Note    Admit Date: 2025  POD: 3 Days Post-Op      Procedure:  Procedure(s):  ON PUMP CORONARY ARTERY BYPASS GRAFT (CABG) x4 WITH ENDOSCOPIC VEIN HARVESTING (EVH), WITH CARDIPLEGIA (NO PAC)(E.R.A.S.). INTRAOPERTIVE LUCINDA DONE BY DR. ARVIZU. EVH DONE BY JOEY CALLES      Subjective/interval/overnight events:   Pt up in chair. Feeling fair. Endorsing increased pain this am. Didn't take any oxy prior to bedtime and woke up with intense pains. Also with c/o constipation.     Reports he was unable to complete 3rd walk yesterday, encouraged him to push himself to get 3rd walk in this evening.     2 brief episodes of afib overnight @ 10pm and 5am. Self converted both times, each episode < 2 min.     On 4L NC - decreased to 2L NC. Afebrile.     Objective:     /68   Pulse 77   Temp 98 °F (36.7 °C) (Oral)   Resp 20   Ht 1.829 m (6')   Wt (!) 138.6 kg (305 lb 9.6 oz)   SpO2 94%   BMI 41.45 kg/m²   Temp (24hrs), Av.6 °F (37 °C), Min:98 °F (36.7 °C), Max:99.4 °F (37.4 °C)      Last 24hr Input/Output:    Intake/Output Summary (Last 24 hours) at 2025 0846  Last data filed at 2025 0335  Gross per 24 hour   Intake 969.47 ml   Output 1840 ml   Net -870.53 ml        EKG/Rhythm: NSR 60-70s    CXR: Xray Result (most recent):  XR CHEST PORTABLE 2025    Narrative  EXAM:  XR CHEST PORTABLE    INDICATION: Post op open heart surgery    COMPARISON: 2025    TECHNIQUE: 0553 hours portable chest AP view    FINDINGS: Status post median sternotomy. Heart size is within normal limits.    Right IJ catheter has been removed. Mediastinal pleural drains are in place.  Lungs demonstrate improved aeration with decreasing bibasilar atelectasis. There  is still mild atelectasis medially in the left lower lobe. No pneumothorax.    Impression  1. Improved aeration with decrease in atelectasis          Electronically signed by Jc García      Admission Weight: Last Weight   Weight - Scale: 132.1 kg (291  incisional care.   Post-op pain control:  continue scheduled Tylenol, lidocaine patches, Robaxin and gabapentin; toradol completed.   HTN: On spironolactone, Hyzaar PTA. BB as above.   HLD: Continue statin.   JAMES, on CPAP: Continue.   Asthma: does not see pulmonologist PTA. No meds PTA.  GERD: On Protonix PTA; continue.  Gout: On allopurinol PTA; continue.  Arthritis: No meds PTA. Multiple orthopedic procedures in the past but ambulates well per patient. Supportive care and pain meds as above.   ADHD: On Concerta PTA.  HSV: Valtrex.   Hx Guillain-Streetman (2015): Noted.   Obesity: BMI 39.49; recommend medically assisted weight loss program on OP basis.    Fluid and electrolytes: PO. Maintain K+ >4 and magnesium level >2.  Nutrition: ADULT ORAL NUTRITION SUPPLEMENT; Breakfast, Dinner; Low Calorie/High Protein Oral Supplement  ADULT DIET; Regular; Low Fat/Low Chol/High Fiber/MAX; No Concentrated sweets  Activity: OOB all meals and ambulate in halls TID; PT/OT; IS 10-15 x an hour while awake.  Bowel Regimen: increase today for c/o constipation - suppository midday if no BM  GI ppx: Protonix.  DVT ppx: SCDs    Dispo:  Intermediate status. PT/OT. CM following to aid in dc planning home with  services goal tomorrow     Case discussed with: Primary RN    Signed By: ANDRY Dixon - RACHEL

## 2025-06-29 NOTE — PLAN OF CARE
Problem: Physical Therapy - Adult  Goal: By Discharge: Performs mobility at highest level of function for planned discharge setting.  See evaluation for individualized goals.  Description: FUNCTIONAL STATUS PRIOR TO ADMISSION: Patient was independent and active without use of DME.    HOME SUPPORT PRIOR TO ADMISSION: The patient lived alone with nupur to provide assistance.Will have 24/7 from nupur and daughter/nupur's daughter.    Physical Therapy Goals  Initiated 6/27/2025  1.  Patient will move from supine to sit and sit to supine, scoot up and down, and roll side to side in bed with modified independence within 5 day(s).    2.  Patient will perform sit to stand with modified independence within 5 day(s).  3.  Patient will transfer from bed to chair and chair to bed with modified independence using the least restrictive device within 5 day(s).  4.  Patient will ambulate with modified independence for 200 feet with the least restrictive device within 5 day(s).   5.  Patient will ascend/descend 1 stairs without handrail(s) with supervision/set-up within 5 day(s).  6.  Patient will perform cardiac exercises per protocol with independence within 5 days.  7.  Patient will verbally recall and functionally demonstrate mindful-based movements (\"move in the tube\") principles without cues within 5 days.  Outcome: Progressing     PHYSICAL THERAPY TREATMENT    Patient: Miko Toney (49 y.o. male)  Date: 6/29/2025  Diagnosis: CAD, multiple vessel [I25.10] CAD, multiple vessel  Procedure(s) (LRB):  ON PUMP CORONARY ARTERY BYPASS GRAFT (CABG) x4 WITH ENDOSCOPIC VEIN HARVESTING (EVH), WITH CARDIPLEGIA (NO PAC)(E.R.A.S.). INTRAOPERTIVE LUCINDA DONE BY DR. ARVIZU. EVH DONE BY JOEY CALLES (N/A) 3 Days Post-Op  Precautions: Restrictions/Precautions  Restrictions/Precautions:  (move in the tube)            ASSESSMENT:  Patient continues to benefit from skilled PT services and is progressing towards goals. Received patient in

## 2025-06-29 NOTE — PLAN OF CARE
Problem: Discharge Planning  Goal: Discharge to home or other facility with appropriate resources  Outcome: Progressing     Problem: ABCDS Injury Assessment  Goal: Absence of physical injury  Outcome: Progressing     Problem: Cardiovascular - Adult  Goal: Maintains optimal cardiac output and hemodynamic stability  Outcome: Progressing  Goal: Absence of cardiac dysrhythmias or at baseline  Outcome: Progressing     Problem: Skin/Tissue Integrity - Adult  Goal: Incisions, wounds, or drain sites healing without S/S of infection  Outcome: Progressing  Goal: Skin integrity remains intact  Description: 1.  Monitor for areas of redness and/or skin breakdown  2.  Assess vascular access sites hourly  3.  Every 4-6 hours minimum:  Change oxygen saturation probe site  4.  Every 4-6 hours:  If on nasal continuous positive airway pressure, respiratory therapy assess nares and determine need for appliance change or resting period  Outcome: Progressing  Flowsheets (Taken 6/28/2025 1139 by Donn Lujan, RN)  Skin Integrity Remains Intact: Monitor for areas of redness and/or skin breakdown     Problem: Gastrointestinal - Adult  Goal: Maintains or returns to baseline bowel function  Outcome: Progressing  Goal: Maintains adequate nutritional intake  Outcome: Progressing  Goal: Minimal or absence of nausea and vomiting  Outcome: Progressing     Problem: Safety - Adult  Goal: Free from fall injury  Outcome: Progressing     Problem: Pain  Goal: Verbalizes/displays adequate comfort level or baseline comfort level  Outcome: Progressing     Problem: Skin/Tissue Integrity  Goal: Skin integrity remains intact  Description: 1.  Monitor for areas of redness and/or skin breakdown  2.  Assess vascular access sites hourly  3.  Every 4-6 hours minimum:  Change oxygen saturation probe site  4.  Every 4-6 hours:  If on nasal continuous positive airway pressure, respiratory therapy assess nares and determine need for appliance change or resting

## 2025-06-30 ENCOUNTER — APPOINTMENT (OUTPATIENT)
Facility: HOSPITAL | Age: 50
DRG: 280 | End: 2025-06-30
Attending: STUDENT IN AN ORGANIZED HEALTH CARE EDUCATION/TRAINING PROGRAM
Payer: COMMERCIAL

## 2025-06-30 VITALS
SYSTOLIC BLOOD PRESSURE: 128 MMHG | TEMPERATURE: 97.9 F | WEIGHT: 304.5 LBS | BODY MASS INDEX: 41.24 KG/M2 | HEIGHT: 72 IN | OXYGEN SATURATION: 93 % | RESPIRATION RATE: 17 BRPM | HEART RATE: 77 BPM | DIASTOLIC BLOOD PRESSURE: 78 MMHG

## 2025-06-30 DIAGNOSIS — Z95.1 STATUS POST CORONARY ARTERY BYPASS GRAFT: Primary | ICD-10-CM

## 2025-06-30 LAB
ANION GAP SERPL CALC-SCNC: 5 MMOL/L (ref 2–12)
BUN SERPL-MCNC: 16 MG/DL (ref 6–20)
BUN/CREAT SERPL: 24 (ref 12–20)
CALCIUM SERPL-MCNC: 8.3 MG/DL (ref 8.5–10.1)
CHLORIDE SERPL-SCNC: 102 MMOL/L (ref 97–108)
CO2 SERPL-SCNC: 29 MMOL/L (ref 21–32)
CREAT SERPL-MCNC: 0.68 MG/DL (ref 0.7–1.3)
EKG ATRIAL RATE: 66 BPM
EKG DIAGNOSIS: NORMAL
EKG P AXIS: 5 DEGREES
EKG P-R INTERVAL: 180 MS
EKG Q-T INTERVAL: 454 MS
EKG QRS DURATION: 82 MS
EKG QTC CALCULATION (BAZETT): 475 MS
EKG R AXIS: -2 DEGREES
EKG T AXIS: 11 DEGREES
EKG VENTRICULAR RATE: 66 BPM
ERYTHROCYTE [DISTWIDTH] IN BLOOD BY AUTOMATED COUNT: 13.2 % (ref 11.5–14.5)
GLUCOSE SERPL-MCNC: 106 MG/DL (ref 65–100)
HCT VFR BLD AUTO: 31.8 % (ref 36.6–50.3)
HGB BLD-MCNC: 10.6 G/DL (ref 12.1–17)
MAGNESIUM SERPL-MCNC: 2 MG/DL (ref 1.6–2.4)
MCH RBC QN AUTO: 30.4 PG (ref 26–34)
MCHC RBC AUTO-ENTMCNC: 33.3 G/DL (ref 30–36.5)
MCV RBC AUTO: 91.1 FL (ref 80–99)
NRBC # BLD: 0 K/UL (ref 0–0.01)
NRBC BLD-RTO: 0 PER 100 WBC
PLATELET # BLD AUTO: 184 K/UL (ref 150–400)
PMV BLD AUTO: 11.3 FL (ref 8.9–12.9)
POTASSIUM SERPL-SCNC: 4.1 MMOL/L (ref 3.5–5.1)
RBC # BLD AUTO: 3.49 M/UL (ref 4.1–5.7)
SODIUM SERPL-SCNC: 136 MMOL/L (ref 136–145)
WBC # BLD AUTO: 18.9 K/UL (ref 4.1–11.1)

## 2025-06-30 PROCEDURE — 36415 COLL VENOUS BLD VENIPUNCTURE: CPT

## 2025-06-30 PROCEDURE — 6360000002 HC RX W HCPCS: Performed by: NURSE PRACTITIONER

## 2025-06-30 PROCEDURE — 6370000000 HC RX 637 (ALT 250 FOR IP)

## 2025-06-30 PROCEDURE — 97116 GAIT TRAINING THERAPY: CPT

## 2025-06-30 PROCEDURE — 94660 CPAP INITIATION&MGMT: CPT

## 2025-06-30 PROCEDURE — 97110 THERAPEUTIC EXERCISES: CPT

## 2025-06-30 PROCEDURE — 06BP4ZZ EXCISION OF RIGHT SAPHENOUS VEIN, PERCUTANEOUS ENDOSCOPIC APPROACH: ICD-10-PCS | Performed by: THORACIC SURGERY (CARDIOTHORACIC VASCULAR SURGERY)

## 2025-06-30 PROCEDURE — 02100Z9 BYPASS CORONARY ARTERY, ONE ARTERY FROM LEFT INTERNAL MAMMARY, OPEN APPROACH: ICD-10-PCS | Performed by: THORACIC SURGERY (CARDIOTHORACIC VASCULAR SURGERY)

## 2025-06-30 PROCEDURE — 97535 SELF CARE MNGMENT TRAINING: CPT | Performed by: OCCUPATIONAL THERAPIST

## 2025-06-30 PROCEDURE — 85027 COMPLETE CBC AUTOMATED: CPT

## 2025-06-30 PROCEDURE — 2700000000 HC OXYGEN THERAPY PER DAY

## 2025-06-30 PROCEDURE — 021209W BYPASS CORONARY ARTERY, THREE ARTERIES FROM AORTA WITH AUTOLOGOUS VENOUS TISSUE, OPEN APPROACH: ICD-10-PCS | Performed by: THORACIC SURGERY (CARDIOTHORACIC VASCULAR SURGERY)

## 2025-06-30 PROCEDURE — 71045 X-RAY EXAM CHEST 1 VIEW: CPT

## 2025-06-30 PROCEDURE — 97530 THERAPEUTIC ACTIVITIES: CPT | Performed by: OCCUPATIONAL THERAPIST

## 2025-06-30 PROCEDURE — 83735 ASSAY OF MAGNESIUM: CPT

## 2025-06-30 PROCEDURE — 80048 BASIC METABOLIC PNL TOTAL CA: CPT

## 2025-06-30 PROCEDURE — 6370000000 HC RX 637 (ALT 250 FOR IP): Performed by: NURSE PRACTITIONER

## 2025-06-30 PROCEDURE — 5A1221Z PERFORMANCE OF CARDIAC OUTPUT, CONTINUOUS: ICD-10-PCS | Performed by: THORACIC SURGERY (CARDIOTHORACIC VASCULAR SURGERY)

## 2025-06-30 PROCEDURE — 2500000003 HC RX 250 WO HCPCS

## 2025-06-30 RX ORDER — POLYETHYLENE GLYCOL 3350 17 G/17G
17 POWDER, FOR SOLUTION ORAL DAILY PRN
Qty: 30 PACKET | Refills: 0 | Status: SHIPPED | OUTPATIENT
Start: 2025-06-30 | End: 2025-07-30

## 2025-06-30 RX ORDER — OXYCODONE HYDROCHLORIDE 5 MG/1
5 TABLET ORAL EVERY 8 HOURS PRN
Qty: 20 TABLET | Refills: 0 | Status: SHIPPED | OUTPATIENT
Start: 2025-06-30 | End: 2025-07-07

## 2025-06-30 RX ORDER — SENNA AND DOCUSATE SODIUM 50; 8.6 MG/1; MG/1
2 TABLET, FILM COATED ORAL 2 TIMES DAILY
Qty: 120 TABLET | Refills: 0 | Status: SHIPPED | OUTPATIENT
Start: 2025-06-30 | End: 2025-07-30

## 2025-06-30 RX ORDER — CLOPIDOGREL BISULFATE 75 MG/1
75 TABLET ORAL DAILY
Qty: 30 TABLET | Refills: 3 | Status: SHIPPED | OUTPATIENT
Start: 2025-06-30

## 2025-06-30 RX ORDER — CLOPIDOGREL BISULFATE 75 MG/1
75 TABLET ORAL DAILY
Status: DISCONTINUED | OUTPATIENT
Start: 2025-06-30 | End: 2025-06-30 | Stop reason: HOSPADM

## 2025-06-30 RX ORDER — ACETAMINOPHEN 500 MG
1000 TABLET ORAL EVERY 6 HOURS
Qty: 120 TABLET | Refills: 3 | Status: SHIPPED
Start: 2025-06-30

## 2025-06-30 RX ORDER — METHOCARBAMOL 750 MG/1
750 TABLET, FILM COATED ORAL 4 TIMES DAILY
Qty: 40 TABLET | Refills: 0 | Status: SHIPPED | OUTPATIENT
Start: 2025-06-30 | End: 2025-07-10

## 2025-06-30 RX ORDER — ASPIRIN 81 MG/1
81 TABLET ORAL DAILY
Qty: 30 TABLET | Refills: 3 | Status: SHIPPED | OUTPATIENT
Start: 2025-07-01

## 2025-06-30 RX ORDER — METOPROLOL TARTRATE 25 MG/1
12.5 TABLET, FILM COATED ORAL 2 TIMES DAILY
Qty: 60 TABLET | Refills: 3 | Status: SHIPPED | OUTPATIENT
Start: 2025-06-30

## 2025-06-30 RX ORDER — FUROSEMIDE 10 MG/ML
40 INJECTION INTRAMUSCULAR; INTRAVENOUS ONCE
Status: COMPLETED | OUTPATIENT
Start: 2025-06-30 | End: 2025-06-30

## 2025-06-30 RX ORDER — ATORVASTATIN CALCIUM 40 MG/1
40 TABLET, FILM COATED ORAL NIGHTLY
Qty: 30 TABLET | Refills: 3 | Status: SHIPPED | OUTPATIENT
Start: 2025-06-30

## 2025-06-30 RX ORDER — POTASSIUM CHLORIDE 1500 MG/1
20 TABLET, EXTENDED RELEASE ORAL ONCE
Status: COMPLETED | OUTPATIENT
Start: 2025-06-30 | End: 2025-06-30

## 2025-06-30 RX ORDER — AMIODARONE HYDROCHLORIDE 200 MG/1
TABLET ORAL
Qty: 84 TABLET | Refills: 0 | Status: SHIPPED | OUTPATIENT
Start: 2025-06-30

## 2025-06-30 RX ORDER — FUROSEMIDE 20 MG/1
20 TABLET ORAL DAILY
Qty: 3 TABLET | Refills: 0 | Status: SHIPPED | OUTPATIENT
Start: 2025-06-30 | End: 2025-07-03

## 2025-06-30 RX ORDER — POTASSIUM CHLORIDE 1500 MG/1
20 TABLET, EXTENDED RELEASE ORAL DAILY
Qty: 3 TABLET | Refills: 0 | Status: SHIPPED | OUTPATIENT
Start: 2025-06-30 | End: 2025-07-03

## 2025-06-30 RX ADMIN — POTASSIUM CHLORIDE 20 MEQ: 1500 TABLET, EXTENDED RELEASE ORAL at 08:04

## 2025-06-30 RX ADMIN — SODIUM CHLORIDE, PRESERVATIVE FREE 10 ML: 5 INJECTION INTRAVENOUS at 10:00

## 2025-06-30 RX ADMIN — OXYCODONE 10 MG: 5 TABLET ORAL at 00:17

## 2025-06-30 RX ADMIN — Medication 400 MG: at 08:04

## 2025-06-30 RX ADMIN — METOPROLOL TARTRATE 12.5 MG: 25 TABLET, FILM COATED ORAL at 08:04

## 2025-06-30 RX ADMIN — PANTOPRAZOLE SODIUM 40 MG: 40 TABLET, DELAYED RELEASE ORAL at 08:04

## 2025-06-30 RX ADMIN — 0.12% CHLORHEXIDINE GLUCONATE 15 ML: 1.2 RINSE ORAL at 10:00

## 2025-06-30 RX ADMIN — METHOCARBAMOL TABLETS 750 MG: 750 TABLET, COATED ORAL at 16:28

## 2025-06-30 RX ADMIN — ALLOPURINOL 300 MG: 300 TABLET ORAL at 08:04

## 2025-06-30 RX ADMIN — ACETAMINOPHEN 1000 MG: 500 TABLET, FILM COATED ORAL at 10:04

## 2025-06-30 RX ADMIN — OXYCODONE 10 MG: 5 TABLET ORAL at 16:28

## 2025-06-30 RX ADMIN — ACETAMINOPHEN 1000 MG: 500 TABLET, FILM COATED ORAL at 00:17

## 2025-06-30 RX ADMIN — FUROSEMIDE 40 MG: 10 INJECTION, SOLUTION INTRAMUSCULAR; INTRAVENOUS at 08:05

## 2025-06-30 RX ADMIN — OXYCODONE 10 MG: 5 TABLET ORAL at 04:24

## 2025-06-30 RX ADMIN — CLOPIDOGREL BISULFATE 75 MG: 75 TABLET, FILM COATED ORAL at 09:59

## 2025-06-30 RX ADMIN — ASPIRIN 81 MG: 81 TABLET, DELAYED RELEASE ORAL at 08:04

## 2025-06-30 RX ADMIN — METHOCARBAMOL TABLETS 750 MG: 750 TABLET, COATED ORAL at 08:04

## 2025-06-30 RX ADMIN — MUPIROCIN: 20 OINTMENT TOPICAL at 09:57

## 2025-06-30 RX ADMIN — AMIODARONE HYDROCHLORIDE 400 MG: 200 TABLET ORAL at 08:04

## 2025-06-30 ASSESSMENT — PAIN DESCRIPTION - LOCATION
LOCATION: INCISION
LOCATION: CHEST
LOCATION: INCISION
LOCATION: INCISION

## 2025-06-30 ASSESSMENT — PAIN DESCRIPTION - ORIENTATION
ORIENTATION: MID

## 2025-06-30 ASSESSMENT — PAIN - FUNCTIONAL ASSESSMENT
PAIN_FUNCTIONAL_ASSESSMENT: ACTIVITIES ARE NOT PREVENTED

## 2025-06-30 ASSESSMENT — PAIN DESCRIPTION - DESCRIPTORS
DESCRIPTORS: ACHING

## 2025-06-30 ASSESSMENT — PAIN DESCRIPTION - FREQUENCY: FREQUENCY: CONTINUOUS

## 2025-06-30 ASSESSMENT — PAIN SCALES - GENERAL
PAINLEVEL_OUTOF10: 0
PAINLEVEL_OUTOF10: 7
PAINLEVEL_OUTOF10: 0
PAINLEVEL_OUTOF10: 0
PAINLEVEL_OUTOF10: 7
PAINLEVEL_OUTOF10: 7

## 2025-06-30 ASSESSMENT — PAIN DESCRIPTION - PAIN TYPE: TYPE: SURGICAL PAIN

## 2025-06-30 NOTE — CARDIO/PULMONARY
Cardiac rehab orders placed by Flores surgical coordinator, patient in agreement with attending [facility] cardiac rehab.

## 2025-06-30 NOTE — DIABETES MGMT
[facility]  [facility] FOR DIABETES HEALTH  DIABETES MANAGEMENT CONSULT    Consulted by Provider for advanced nursing evaluation and care for inpatient blood glucose management.    Evaluation and Action Plan   Miko Toney is a 49 y.o. male with no known hx of diabetes. Admitted for CABG surgery. He was originally seen a week prior for complaints of chest pain. Troponin trended up to 859. Cardiac cath with final impression of Multivessel critical coronary stenosis with preserved left ventricular systolic contractility. CABG workup.  PMHx significant for hypertension, GERD, prediabetes and gout.    The patient was started on the insulin infusion per CTS protocol. He will remain x48 hours. The patient is using a significant amount of insulin this morning. I suspect he may require antihyperglycemic intervention for a couple days once transitioned off the insulin infusion. I do think he will require at discharge, However he may benefit from Metformin. This can be discussed prior to discharge      this morning. Use little corrective insulin yesterday. So far none today. Anticipated for discharge. No diabetes medication recommended.     Blood glucose pattern        Management Rationale Action Plan   Medication   Basal needs Using insulin infusion per CTS protocol  Transition off the insulin infusion tomorrow   Nutritional needs Covers carb load in meals    Corrective insulin Using medium dose sensitivity based on weight    Additional orders  Carb consistent diet (60g CHO/meal)       Diabetes Discharge Plan   Medication  A1c 6.1%. The patient does not take diabetes medication and will  not require at discharge.    Additional orders  Check A1c at least twice yearly  Lifestyle modifications to help prevent diabetes     Past Medical History     Past Medical History:   Diagnosis Date    Allergic rhinitis due to pollen 06/21/2025    Asthma     Circadian rhythm sleep disorder, shift work type 06/21/2025     Gastroesophageal reflux disease without esophagitis 06/21/2025    Genital herpes simplex 06/21/2025    Gout 06/21/2025    History of Guillain[place] syndrome 06/21/2025    Hypertension     Mild intermittent asthma 06/21/2025    Mixed hyperlipidemia 06/21/2025    Obstructive sleep apnea 06/21/2025    Osteoarthritis of knee 06/21/2025    Ventricular premature depolarization 06/21/2025       Hospital Course   Clinical progress has been complicated by CAD          Diabetes self-management practices:   No hx of DM         Subjective   Resting quietly     Objective   Physical exam  General Overweight  male in no acute distress.   Neuro  Alert, oriented   Vital Signs   Vitals:    06/30/25 1209   BP: 97/61   Pulse: 83   Resp:    Temp:    SpO2: 93%         Laboratory  Recent Labs     06/28/25  0406 06/29/25  0459 06/30/25  0602   WBC 25.1* 20.2* 18.9*   HGB 10.6* 9.9* 10.6*   HCT 33.3* 30.6* 31.8*   MCV 94.1 92.4 91.1   * 137* 184     Recent Labs     06/28/25  0406 06/29/25  0459 06/30/25  0602    135* 136   K 4.9 4.0 4.1    105 102   CO2 28 26 29   PHOS 3.9 2.6  --    BUN 23* 20 16   CREATININE 1.19 0.84 0.68*     Lab Results   Component Value Date     (H) 06/26/2025    AST 76 (H) 06/26/2025    ALKPHOS 44 (L) 06/26/2025    BILITOT 0.8 06/26/2025     Lab Results   Component Value Date    TSH 2.55 06/22/2025         Factors impacting BG management  Factor Dose Comments   Nutrition:  Standard meals   60 grams/meal    Pain PRN acetaminophen    Infection ancef    Kidney function Normal    Liver function Normal      Billing Code(s)   [] 98043 IP initial hospital care - 40 minutes   [] 84665 IP initial hospital care -55 minutes  [] 59612 IP initial hospital care -75 minutes  []        72082 IP subsequent hospital care - 50 minutes   [] 65698 IP subsequent hospital care - 35 minutes   [] 22548 IP subsequent hospital care - 25 minutes       Before making these care recommendations, I personally reviewed the

## 2025-06-30 NOTE — PROGRESS NOTES
[facility] End of Shift Note    Bedside shift change report given to JOHN Renee (oncoming nurse) by Bridger Schulz RN (offgoing nurse).  Report included the following information SBAR, Kardex, Intake/Output, and Recent Results    Shift worked:  7p-7a     Shift summary and any significant changes:     Pt had bowel movement. Firm pellets and small to moderate in size     Pt requesting Oxycodone at every 4 hour increment.     Continued irregular rhythm (PACs and PVCs) occurring frequently     Concerns for physician to address:         Activity:  Level of Assistance: Standby assist, set-up cues, supervision of patient - no hands on  Number times ambulated in hallways past shift: 0  Number of times OOB to chair past shift: 1    Neurologic:  Level of Consciousness: Alert (0)  Orientation Level: Oriented X4  Cognition: Appropriate judgement, Appropriate safety awareness, Appropriate attention/concentration, Appropriate for developmental age, Follows commands  Speech: Clear    Cardiac:   Cardiac Rhythm: Sinus rhythm    Pacer Wires: Pacer Wires: Removed          Respiratory:   O2 Device: (S) PAP (positive airway pressure)  ISS Teaching YES   Use : YES     Volume: 2000  Incentive Spirometry Tx  Treatment Effort: Needs encouragement, Independent  Predicted Volume: 3000  Maximum Achieved Volume (mL): 1700 mL  Number of Breaths:  (encouraged to perform every commercial break)     Genitourinary:   Urinary Status: Voiding    GI:  Last BM (including prior to admit): 06/29/25  Current diet:  ADULT ORAL NUTRITION SUPPLEMENT; Breakfast, Dinner; Low Calorie/High Protein Oral Supplement  ADULT DIET; Regular; Low Fat/Low Chol/High Fiber/MAX; No Concentrated sweets  Passing flatus: YES    Lines/Drains/Airway:  Peripheral IV    Peripheral IV x 2: YES        Skin:    Wound Care Documentation:         Terry Scale Score: 20  Interventions: Wound Offloading (Prevention Methods): Bed, pressure redistribution/air, Bed, pressure reduction

## 2025-06-30 NOTE — PLAN OF CARE
Problem: Discharge Planning  Goal: Discharge to home or other facility with appropriate resources  6/30/2025 1108 by Thelma Chance RN  Outcome: Progressing  6/30/2025 0358 by Bridger Schulz RN  Outcome: Progressing     Problem: ABCDS Injury Assessment  Goal: Absence of physical injury  6/30/2025 1108 by Thelma Chance RN  Outcome: Progressing  6/30/2025 0358 by Bridger Schulz RN  Outcome: Progressing     Problem: Cardiovascular - Adult  Goal: Maintains optimal cardiac output and hemodynamic stability  6/30/2025 1108 by Thelma Chance RN  Outcome: Progressing  6/30/2025 0358 by Bridger Schulz RN  Outcome: Progressing  Goal: Absence of cardiac dysrhythmias or at baseline  6/30/2025 1108 by Thelma Chance RN  Outcome: Progressing  6/30/2025 0358 by Bridger Schulz RN  Outcome: Progressing     Problem: Skin/Tissue Integrity - Adult  Goal: Incisions, wounds, or drain sites healing without S/S of infection  6/30/2025 1108 by Thelma Chance RN  Outcome: Progressing  6/30/2025 0358 by Bridger Schulz RN  Outcome: Progressing  Goal: Skin integrity remains intact  Description: 1.  Monitor for areas of redness and/or skin breakdown  2.  Assess vascular access sites hourly  3.  Every 4-6 hours minimum:  Change oxygen saturation probe site  4.  Every 4-6 hours:  If on nasal continuous positive airway pressure, respiratory therapy assess nares and determine need for appliance change or resting period  6/30/2025 1108 by Thelma Chance RN  Outcome: Progressing  6/30/2025 0358 by Bridger Schulz RN  Outcome: Progressing     Problem: Gastrointestinal - Adult  Goal: Maintains or returns to baseline bowel function  6/30/2025 1108 by Thelma Chance RN  Outcome: Progressing  6/30/2025 0358 by Bridger Schulz RN  Outcome: Progressing  Goal: Maintains adequate nutritional intake  6/30/2025 1108 by Thelma Chance RN  Outcome: Progressing  6/30/2025 0358 by Bridger Schulz RN  Outcome:

## 2025-06-30 NOTE — PLAN OF CARE
Problem: Occupational Therapy - Adult  Goal: By Discharge: Performs self-care activities at highest level of function for planned discharge setting.  See evaluation for individualized goals.  Description: FUNCTIONAL STATUS PRIOR TO ADMISSION:  He is full independent and working in IT.  HOME SUPPORT: Patient was living alone, but his fiance' and daughter will be with him after discharge.     Occupational Therapy Goals:  Initiated 6/27/2025  1.  Patient will perform grooming standing at sink with [place] within 7 day(s).  2.  Patient will perform upper body dressing with Set-up and Supervision within 7 day(s).  3.  Patient will perform lower body dressing with Set-up and Supervision within 7 day(s).  4.  Patient will perform toilet transfers with Supervision  within 7 day(s).  5.  Patient will perform all aspects of toileting with Supervision within 7 day(s).  6.  Patient will perform cleaning of sternal incision at sink or in shower with Set-up and Supervision within 7 day(s).     Outcome: Progressing    OCCUPATIONAL THERAPY TREATMENT  Patient: Miko Toney (49 y.o. male)  Date: 6/30/2025  Primary Diagnosis: CAD, multiple vessel [I25.10]  Procedure(s) (LRB):  ON PUMP CORONARY ARTERY BYPASS GRAFT (CABG) x4 WITH ENDOSCOPIC VEIN HARVESTING (EVH), WITH CARDIPLEGIA (NO PAC)(E.R.A.S.). INTRAOPERTIVE LUCINDA DONE BY DR. ARVIZU. EVH DONE BY JOEY CALLES (N/A) 4 Days Post-Op   Precautions:  (move in the tube)                Chart, occupational therapy assessment, plan of care, and goals were reviewed.    ASSESSMENT  Patient presented up in chair, agreeable work with OT. Overall he was mod I for transfers, supervision for ambulation, supervision/setup for dressing ADLs and he was independent for standing grooming. He was able to verbalize full recall of his move in the tube precautions, and was able to complete his dressing ADLs without any addition cueing needed for adherence to them. Time was taken to provide verbal  cleaning process (rinsing, washing with Nickie-hex, rinsing and drying)    -gentley washing over entire length of incisions with Nickie-hex (no scrubbing, wash in circular motion).   -the benefits of using a mirror to fully visualize incisions for proper washing  -Changing into a clean shirt after cleaning the incision.     -take rest breaks/sit down when needed and not rushing through task  -don't apply lotions/serums to sternal incisions  -don't remove dressings yourself  -if any questions arise patient was educated to contact their surgeon's office    Patient verbalized good understanding.      Patient declined need to carryout incision cleaning via showering or at sink for reinforcement upon completion of the above education provided.        Activity Tolerance:   Good and tolerates ADLS without rest breaks      After treatment:   Patient left in no apparent distress sitting up in chair and Call bell within reach    COMMUNICATION/EDUCATION:   The patient's plan of care was discussed with: physical therapist and registered nurse    Patient Education  Education Given To: Patient  Education Provided: Role of Therapy;Plan of Care;Transfer Training;ADL Adaptive Strategies;Precautions  Education Method: Verbal;Demonstration;Teach Back  Barriers to Learning: None  Education Outcome: Verbalized understanding;Demonstrated understanding    Thank you for this referral.  Julius Jefferson, OTR/L  Minutes: 26

## 2025-06-30 NOTE — DISCHARGE SUMMARY
Cardiac Surgery Discharge Summary     Patient ID:  Miko Toney  [id number]  49 y.o.  1975    Admit date: 6/21/2025    Discharge date: 6/30/2025     Admitting Physician: Philipp Shah MD     Referring Cardiologist:  Dr. Villafana     PCP:  Bridger Joshi MD    Admitting Diagnoses: NSTEMI, CAD    Discharge Diagnoses:     1. CAD, multiple vessel    2. ACS (acute coronary syndrome) (HCC)    3. S/P CABG x 4        Discharged Condition: Good and Stable    Disposition: home, see patient instructions for treatment and plan    Procedures for this admission:  Procedure(s):  ON PUMP CORONARY ARTERY BYPASS GRAFT (CABG) x4 WITH ENDOSCOPIC VEIN HARVESTING (EVH), WITH CARDIPLEGIA (NO PAC)(E.R.A.S.). INTRAOPERTIVE LUCINDA DONE BY DR. ARVIZU. EVH DONE BY JOEY CALLES    Discharge Medications:      Medication List        START taking these medications      acetaminophen 500 MG tablet  Commonly known as: TYLENOL  Take 2 tablets by mouth every 6 hours     amiodarone 200 MG tablet  Commonly known as: CORDARONE  Take 2 tabs by mouth twice a day for 2 weeks, then take 2 tabs by mouth daily for 2 weeks, then stop.     aspirin 81 MG EC tablet  Take 1 tablet by mouth daily  Start taking on: July 1, 2025     atorvastatin 40 MG tablet  Commonly known as: LIPITOR  Take 1 tablet by mouth nightly     clopidogrel 75 MG tablet  Commonly known as: PLAVIX  Take 1 tablet by mouth daily     furosemide 20 MG tablet  Commonly known as: Lasix  Take 1 tablet by mouth daily for 3 days     melatonin 3 MG Tabs tablet  Take 2 tablets by mouth nightly as needed (take as the sun is going down)     methocarbamol 750 MG tablet  Commonly known as: ROBAXIN  Take 1 tablet by mouth 4 times daily for 10 days     metoprolol tartrate 25 MG tablet  Commonly known as: LOPRESSOR  Take 0.5 tablets by mouth 2 times daily     oxyCODONE 5 MG immediate release tablet  Commonly known as: ROXICODONE  Take 1 tablet by mouth every 8 hours as needed for Pain for up to 7 days.

## 2025-06-30 NOTE — PROGRESS NOTES
Cardiac Surgery Care Coordinator-  Met with Miko Toney. Reviewed plan of care and discharge instructions. Reinforced move in the tube sternal precautions and continued use of the incentive spirometer.  Miko Toney is able to pull 1750cc.  Reviewed the importance of daily temp and weight monitoring, discussed incisional care and reviewed signs and symptoms of infection.  Red reminder bracelet on left wrist.  Reviewed purpose of the bracelet and when to call the MD. Using the teach back method reviewed medications.  Pt received medications through meds to beds.  Reminded pt of appts and encouraged participation in the Cardiac Wellness and rehab program after discharge.  Encouraged Miko Toney to verbalize and emotional support given.  Miko Toney is without questions or concerns at this time. Will follow up with a phone call after discharge. Flores Shah RN

## 2025-06-30 NOTE — CARE COORDINATION
Transition of Care Plan:    RUR: 11% \"low risk\"  Prior Level of Functioning: Independent with ADL's   Disposition: Home with spouse and HH (referrals pending)   OSWALD: 6/30  If SNF or IPR: Date FOC offered: N/A  Follow up appointments: PCP/Specialists as indicated  DME needed: RW - [facility]   Transportation at discharge: Spouse to transport   IM/IMM Medicare/ letter given: N/A  Is patient a Fence and connected with VA? No  Caregiver Contact: Radha Lester (spouse), [phone]  Discharge Caregiver contacted prior to discharge? To be contacted  Care Conference needed? Not at this time   Barriers to discharge: None    1328 PM: CM received RW approval from [facility]. CM to deliver to pt at the bedside. HH referrals remain pending at this time.     1055 AM: Chart reviewed. CM met with pt at the bedside to discuss d/c plan. CM sent additional HH referrals due to pt insurance and service area. CM to send RW order to [facility].      06/30/25 1329   Services At/After Discharge   Transition of Care Consult (CM Consult) Discharge Planning   Services At/After Discharge DME    Resource Information Provided? No   Mode of Transport at Discharge Other (see comment)  (Spouse)   Confirm Follow Up Transport Family   Condition of Participation: Discharge Planning   The Plan for Transition of Care is related to the following treatment goals: Return home with spouse   The Patient and/or Patient Representative was provided with a Choice of Provider? Patient   The Patient and/Or Patient Representative agree with the Discharge Plan? Yes     MAHAD Chowdhury  Care Management  [facility]   [phone]

## 2025-06-30 NOTE — PLAN OF CARE
Problem: Physical Therapy - Adult  Goal: By Discharge: Performs mobility at highest level of function for planned discharge setting.  See evaluation for individualized goals.  Description: FUNCTIONAL STATUS PRIOR TO ADMISSION: Patient was independent and active without use of DME.    HOME SUPPORT PRIOR TO ADMISSION: The patient lived alone with nupur to provide assistance.Will have 24/7 from nupur and daughter/nupur's daughter.    Physical Therapy Goals  Initiated 6/27/2025  1.  Patient will move from supine to sit and sit to supine, scoot up and down, and roll side to side in bed with modified independence within 5 day(s).    2.  Patient will perform sit to stand with modified independence within 5 day(s).  3.  Patient will transfer from bed to chair and chair to bed with modified independence using the least restrictive device within 5 day(s).  4.  Patient will ambulate with modified independence for 200 feet with the least restrictive device within 5 day(s).   5.  Patient will ascend/descend 1 stairs without handrail(s) with supervision/set-up within 5 day(s).  6.  Patient will perform cardiac exercises per protocol with independence within 5 days.  7.  Patient will verbally recall and functionally demonstrate mindful-based movements (\"move in the tube\") principles without cues within 5 days.  Outcome: Progressing   PHYSICAL THERAPY TREATMENT    Patient: Miko Toney (49 y.o. male)  Date: 6/30/2025  Diagnosis: CAD, multiple vessel [I25.10] CAD, multiple vessel  Procedure(s) (LRB):  ON PUMP CORONARY ARTERY BYPASS GRAFT (CABG) x4 WITH ENDOSCOPIC VEIN HARVESTING (EVH), WITH CARDIPLEGIA (NO PAC)(E.R.A.S.). INTRAOPERTIVE LUCINDA DONE BY DR. ARVIZU. EVH DONE BY JOEY CALLES (N/A) 4 Days Post-Op  Precautions: Restrictions/Precautions  Restrictions/Precautions:  (move in the tube)            ASSESSMENT:  Patient continues to benefit from skilled PT services and is progressing towards goals. Pt tolerated session well.

## 2025-06-30 NOTE — PROGRESS NOTES
CSS Progress Note    Admit Date: 2025  POD: 4 Days Post-Op      Procedure:  Procedure(s):  ON PUMP CORONARY ARTERY BYPASS GRAFT (CABG) x4 WITH ENDOSCOPIC VEIN HARVESTING (EVH), WITH CARDIPLEGIA (NO PAC)(E.R.A.S.). INTRAOPERTIVE LUCINDA DONE BY DR. ARVIZU. EVH DONE BY JOEY CALLES    Subjective/interval/overnight events:   Pt seen with Dr. Barragan. Pt up in chair. Reporting fatigue, low energy.     No further afib, but has had frequent PAC and PVCs.     On RA now, NC removed as O2 sat was 96%. Afebrile.     Objective:     /72   Pulse 71   Temp 97.9 °F (36.6 °C) (Oral)   Resp 17   Ht 1.829 m (6')   Wt (!) 138.1 kg (304 lb 8 oz)   SpO2 94%   BMI 41.30 kg/m²   Temp (24hrs), Av.2 °F (36.8 °C), Min:97.9 °F (36.6 °C), Max:98.4 °F (36.9 °C)    Last 24hr Input/Output:    Intake/Output Summary (Last 24 hours) at 2025 0848  Last data filed at 2025 0413  Gross per 24 hour   Intake 1120 ml   Output 1800 ml   Net -680 ml        EKG/Rhythm: NSR 60-70s    CXR: Xray Result (most recent):  XR CHEST PORTABLE 2025    Narrative  EXAM:  XR CHEST PORTABLE    INDICATION: Post op open heart surgery    COMPARISON: 2025, 2025    TECHNIQUE: portable chest AP view    FINDINGS: Heart size is stable status post median sternotomy. The pulmonary  vasculature is within normal limits.    Lung volumes are low to moderate, stable perihilar interstitial prominence and  bibasilar atelectasis with small left pleural effusion.. The visualized bones  and upper abdomen are age-appropriate.    Impression  Stable perihilar and bibasilar interstitial prominence and left basilar  atelectasis, with small left pleural effusion since 2025.      Electronically signed by KAROL AGRAWAL      Admission Weight: Last Weight   Weight - Scale: 132.1 kg (291 lb 3.2 oz) Weight - Scale: (!) 138.1 kg (304 lb 8 oz)       EXAM:     General: awake and alert   Lungs:    diminished breath sounds bilaterally   Incision:  Incision

## 2025-07-01 ENCOUNTER — TELEPHONE (OUTPATIENT)
Facility: HOSPITAL | Age: 50
End: 2025-07-01

## 2025-07-01 NOTE — TELEPHONE ENCOUNTER
Cardiac Surgery Discharge - Follow up call placed to Miko Toney. Reviewed plan of care after discharge and encouraged Miko Toney to verbalize. Discussed precautions. Pt received medications through meds to beds and they were reviewed prior to discharge. Patient without questions regarding medications. Encouraged continued use of the incentive spirometer, daily weights and temp.  He is showering and walking. Confirmed follow up appts and reinforced importance of wearing red reminder bracelet. Miko Toney is without questions or concerns. Flores Shah RN

## 2025-07-02 ENCOUNTER — TELEPHONE (OUTPATIENT)
Age: 50
End: 2025-07-02

## 2025-07-02 NOTE — TELEPHONE ENCOUNTER
[place] insurance group calling on behalf of patient who called them for assistance with HH orders. He states he was told to call him health insurance and have them reach out to find him home health. Informed both patient and  that patient need HH PT/ OT and skilled nursing for 4-6 week [facility] She states she will begin working on this. Patient was present on phone call for conversations.

## 2025-07-02 NOTE — PROCEDURES
Pulmonary Function Test        PATIENT ID: Miko Toney  MRN: [id number]   YOB: 1975    DATE OF ADMISSION: [id number]    PRIMARY CARE PROVIDER: Bridger Joshi MD       Spirometry:  Spirometry is normal.    Bronchodilator response:  No significant improvement with bronchodilator therapy    Volumes:  Lung volumes not performed.    Diffusion:  Diffusing capacity is normal.    Flow-Volume:  The flow-volume loop is normal.      Signed:   Kulwinder Chan MD  7/2/2025  4:41 PM

## 2025-07-03 ENCOUNTER — TELEPHONE (OUTPATIENT)
Age: 50
End: 2025-07-03

## 2025-07-03 NOTE — TELEPHONE ENCOUNTER
Patient called stating his sternal incision is draining profusely. Said he spoke with someone last night who said there is likely edema under the wound which is draining. I informed patient he is likely draining from the incision as a result of dieresis. He does not wish to come to clinic but wants to know what to do. Explained continuous dressing changes and monitoring is best practice. Patient given the option to come in which he declined. Marisabel RAMOS informed

## 2025-07-10 ENCOUNTER — OFFICE VISIT (OUTPATIENT)
Age: 50
End: 2025-07-10

## 2025-07-10 VITALS
TEMPERATURE: 97.8 F | OXYGEN SATURATION: 95 % | DIASTOLIC BLOOD PRESSURE: 78 MMHG | BODY MASS INDEX: 39.09 KG/M2 | HEART RATE: 69 BPM | WEIGHT: 288.2 LBS | SYSTOLIC BLOOD PRESSURE: 121 MMHG

## 2025-07-10 DIAGNOSIS — Z95.1 S/P CABG X 4: Primary | ICD-10-CM

## 2025-07-10 PROCEDURE — 99024 POSTOP FOLLOW-UP VISIT: CPT | Performed by: THORACIC SURGERY (CARDIOTHORACIC VASCULAR SURGERY)

## 2025-07-10 NOTE — PROGRESS NOTES
Name: Miko Toney   : 1975   Home Phone: [phone]     Reviewed record in preparation for visit and have obtained necessary documentation. Identified pt with two pt identifiers (name and ).     1. Have you been to the ER, urgent care clinic since your last visit?  Hospitalized since your last visit?No    2. Have you seen or consulted any other health care providers outside of the [facility] since your last visit?  Include any pap smears or colon screening. No      Miko Toney is being seen for CABG follow up approximately “One week post-operatively.     Patient counseled on Wound care, Diet, ERAS Protocol, Signs of infection, and Office contact information and instructed to follow up  in approximately one month. Patient given opportunity to ask questions and receive clarification.     Current Medications-Reviewed with Patient    Allergies-Reviewed with Patient    Patient's sternotomy dressing removed per protocol with no complications. Image uploaded to patient's media tab.      Vitals  /78   Pulse 69   Temp 97.8 °F (36.6 °C)   Wt 130.7 kg (288 lb 3.2 oz)   SpO2 95%   BMI 39.09 kg/m²           
Concerta PTA.  HSV: Valtrex.   Hx Angela (2015): Noted.   Obesity: BMI 39.49; recommend medically assisted weight loss program on OP basis.    Pt is ready to start cardiac rehab.     Rehab - y  Walking: y  Glucometer: n/a  Antibiotic card for valves: n/a

## 2025-07-15 NOTE — OP NOTE
[facility]              [address] [place]  [address]                            OPERATIVE REPORT      PATIENT NAME: AUGUSTA POSADA              : 1975  MED REC NO: [id number]                       ROOM: 2407  ACCOUNT NO: [id number]                       ADMIT DATE: 2025  PROVIDER: Dimitris Matt MD    DATE OF SERVICE:  2025    PREOPERATIVE DIAGNOSES:  Multivessel coronary artery disease.    POSTOPERATIVE DIAGNOSES:  Multivessel coronary artery disease.    PROCEDURES PERFORMED:       1. Coronary artery bypass grafting x4 (LIMA to LAD; saphenous vein graft to diagonal to OM; saphenous vein graft to PDA).     2. Endoscopic vein harvest, right lower extremity.    SURGEON:  Dimitris Matt MD    ASSISTANT:  Todd Brasher.    ANESTHESIA:  General endotracheal anesthesia.    ESTIMATED BLOOD LOSS:  50 mL.    SPECIMENS REMOVED:  None.    INTRAOPERATIVE FINDINGS:  None     COMPLICATIONS:  None.    IMPLANTS:  None.    INDICATIONS:  The patient is a very pleasant 49-year-old gentleman who is now being brought to the operating room to undergo coronary artery bypass grafting.    DESCRIPTION OF PROCEDURE:  The patient was brought to the operating room, had a right radial A-line placed without complications.  Bingen-Lamont catheter was placed without complications.  Underwent general endotracheal anesthesia without complications.  Chest, abdomen, and lower extremities were prepped and draped in the usual fashion.  A midline incision was made on the patient's sternum.  Cautery dissection was used to dissect down to the level of sternal bone and the sternal bone with a sagittal saw, and the left pleural space was entered.  During this portion of the procedure, endoscopic vein harvesting was performed from the right lower extremity without complications.  Once the left pleural space was entered, the left internal mammary artery was carefully dissected off the chest

## 2025-07-22 RX ORDER — ASPIRIN 81 MG/1
81 TABLET ORAL DAILY
Qty: 30 TABLET | Refills: 1 | Status: SHIPPED | OUTPATIENT
Start: 2025-07-22

## 2025-07-23 PROBLEM — Z01.810 PREOP CARDIOVASCULAR EXAM: Status: RESOLVED | Noted: 2025-06-23 | Resolved: 2025-07-23

## 2025-07-30 ENCOUNTER — HOSPITAL ENCOUNTER (OUTPATIENT)
Facility: HOSPITAL | Age: 50
Setting detail: RECURRING SERIES
Discharge: HOME | End: 2025-08-02
Payer: COMMERCIAL

## 2025-07-30 VITALS — RESPIRATION RATE: 18 BRPM | BODY MASS INDEX: 37.82 KG/M2 | WEIGHT: 279.2 LBS | HEIGHT: 72 IN | OXYGEN SATURATION: 96 %

## 2025-07-30 PROCEDURE — 93797 PHYS/QHP OP CAR RHAB WO ECG: CPT

## 2025-07-30 PROCEDURE — 93798 PHYS/QHP OP CAR RHAB W/ECG: CPT

## 2025-07-30 ASSESSMENT — PATIENT HEALTH QUESTIONNAIRE - PHQ9
9. THOUGHTS THAT YOU WOULD BE BETTER OFF DEAD, OR OF HURTING YOURSELF: NOT AT ALL
10. IF YOU CHECKED OFF ANY PROBLEMS, HOW DIFFICULT HAVE THESE PROBLEMS MADE IT FOR YOU TO DO YOUR WORK, TAKE CARE OF THINGS AT HOME, OR GET ALONG WITH OTHER PEOPLE: SOMEWHAT DIFFICULT
8. MOVING OR SPEAKING SO SLOWLY THAT OTHER PEOPLE COULD HAVE NOTICED. OR THE OPPOSITE, BEING SO FIGETY OR RESTLESS THAT YOU HAVE BEEN MOVING AROUND A LOT MORE THAN USUAL: NOT AT ALL
SUM OF ALL RESPONSES TO PHQ QUESTIONS 1-9: 2
2. FEELING DOWN, DEPRESSED OR HOPELESS: NOT AT ALL
SUM OF ALL RESPONSES TO PHQ QUESTIONS 1-9: 2
3. TROUBLE FALLING OR STAYING ASLEEP: SEVERAL DAYS
4. FEELING TIRED OR HAVING LITTLE ENERGY: SEVERAL DAYS
7. TROUBLE CONCENTRATING ON THINGS, SUCH AS READING THE NEWSPAPER OR WATCHING TELEVISION: NOT AT ALL
SUM OF ALL RESPONSES TO PHQ QUESTIONS 1-9: 2
6. FEELING BAD ABOUT YOURSELF - OR THAT YOU ARE A FAILURE OR HAVE LET YOURSELF OR YOUR FAMILY DOWN: NOT AT ALL
SUM OF ALL RESPONSES TO PHQ QUESTIONS 1-9: 2
5. POOR APPETITE OR OVEREATING: NOT AT ALL
1. LITTLE INTEREST OR PLEASURE IN DOING THINGS: NOT AT ALL

## 2025-07-30 ASSESSMENT — EXERCISE STRESS TEST
PEAK_METS: 2.7
PEAK_RPE: 12
PEAK_BP: 142/94
PEAK_HR: 77

## 2025-07-30 ASSESSMENT — EJECTION FRACTION: EF_VALUE: 60

## 2025-07-30 NOTE — CARDIO/PULMONARY
INTAKE APPOINTMENT NOTE  2025    NAME: Miko Toney : 1975 AGE: 50 y.o.  GENDER: male    CARDIAC REHAB ADMITTING DIAGNOSIS: Status post coronary artery bypass graft [Z95.1]    REFERRING PHYSICIAN: Dimitris Matt MD    MEDICAL HX:  Past Medical History:   Diagnosis Date    Allergic rhinitis due to pollen 2025    Asthma     Circadian rhythm sleep disorder, shift work type 2025    Gastroesophageal reflux disease without esophagitis 2025    Genital herpes simplex 2025    Gout 2025    History of Guillain-Delphia syndrome 2025    Hypertension     Mild intermittent asthma 2025    Mixed hyperlipidemia 2025    Obstructive sleep apnea 2025    Osteoarthritis of knee 2025    Ventricular premature depolarization 2025       LABS:     No results found for: \"HBA1C\", \"[id number]\"  Lab Results   Component Value Date/Time    CHOL 116 2025 04:18 AM    HDL 33 2025 04:18 AM    LDL 17.4 2025 04:18 AM    VLDL 65.6 2025 04:18 AM         ANTHROPOMETRICS:      Ht Readings from Last 1 Encounters:   25 1.829 m (6')      Wt Readings from Last 1 Encounters:   25 126.6 kg (279 lb 3.2 oz)        WAIST: 52       VISIT SUMMARY:    Miko Toney 50 y.o. presented to Cardiac Rehab for program orientation and 6 minute walk test today with a primary diagnosis of Status post coronary artery bypass graft [Z95.1]. EF is 60 % Cardiac risk factors include family history, dyslipidemia, obesity, hypertension.      Miko Toney is not , and lives with Radha espitia.   Patient was evaluated for depression using the PHQ-9 assessment tool with a result of 2 which is not considered at risk.    Patient denied chest pain or SOB during 6 minute walk test and was in sinus rhythm.  Patient walked 326.9 meters meters at a speed of 2.2 mph and grade of 0 % for a final MET level of 2.7.     Exercise prescription developed using exercise

## 2025-08-04 ENCOUNTER — HOSPITAL ENCOUNTER (OUTPATIENT)
Facility: HOSPITAL | Age: 50
Setting detail: RECURRING SERIES
Discharge: HOME | End: 2025-08-07
Payer: COMMERCIAL

## 2025-08-04 VITALS — WEIGHT: 276 LBS | BODY MASS INDEX: 37.43 KG/M2

## 2025-08-04 PROCEDURE — 93798 PHYS/QHP OP CAR RHAB W/ECG: CPT

## 2025-08-04 ASSESSMENT — EXERCISE STRESS TEST
PEAK_RPE: 12
PEAK_HR: 88
PEAK_METS: 3.2

## 2025-08-05 ENCOUNTER — HOSPITAL ENCOUNTER (OUTPATIENT)
Facility: HOSPITAL | Age: 50
Setting detail: RECURRING SERIES
Discharge: HOME | End: 2025-08-08
Payer: COMMERCIAL

## 2025-08-05 VITALS — WEIGHT: 274 LBS | BODY MASS INDEX: 37.16 KG/M2

## 2025-08-05 PROCEDURE — 93798 PHYS/QHP OP CAR RHAB W/ECG: CPT

## 2025-08-05 ASSESSMENT — EXERCISE STRESS TEST
PEAK_METS: 5.1
PEAK_HR: 96
PEAK_RPE: 12

## 2025-08-06 ENCOUNTER — APPOINTMENT (OUTPATIENT)
Facility: HOSPITAL | Age: 50
End: 2025-08-06
Payer: COMMERCIAL

## 2025-08-07 ENCOUNTER — OFFICE VISIT (OUTPATIENT)
Age: 50
End: 2025-08-07

## 2025-08-07 VITALS
BODY MASS INDEX: 36.77 KG/M2 | DIASTOLIC BLOOD PRESSURE: 94 MMHG | TEMPERATURE: 98.6 F | HEART RATE: 78 BPM | OXYGEN SATURATION: 94 % | WEIGHT: 271.1 LBS | SYSTOLIC BLOOD PRESSURE: 130 MMHG

## 2025-08-07 DIAGNOSIS — Z95.1 S/P CABG X 4: Primary | ICD-10-CM

## 2025-08-07 PROCEDURE — 99024 POSTOP FOLLOW-UP VISIT: CPT | Performed by: THORACIC SURGERY (CARDIOTHORACIC VASCULAR SURGERY)

## 2025-08-07 RX ORDER — LOSARTAN POTASSIUM AND HYDROCHLOROTHIAZIDE 25; 100 MG/1; MG/1
0.5 TABLET ORAL DAILY
COMMUNITY

## 2025-08-07 RX ORDER — CLOPIDOGREL BISULFATE 75 MG/1
75 TABLET ORAL DAILY
Qty: 30 TABLET | Refills: 1 | Status: SHIPPED | OUTPATIENT
Start: 2025-08-07

## 2025-08-11 ENCOUNTER — HOSPITAL ENCOUNTER (OUTPATIENT)
Facility: HOSPITAL | Age: 50
Setting detail: RECURRING SERIES
Discharge: HOME | End: 2025-08-14
Payer: COMMERCIAL

## 2025-08-11 VITALS — WEIGHT: 272 LBS | BODY MASS INDEX: 36.89 KG/M2

## 2025-08-11 PROCEDURE — 93798 PHYS/QHP OP CAR RHAB W/ECG: CPT

## 2025-08-11 ASSESSMENT — EXERCISE STRESS TEST
PEAK_HR: 108
PEAK_METS: 5.1
PEAK_RPE: 12

## 2025-08-12 ENCOUNTER — HOSPITAL ENCOUNTER (OUTPATIENT)
Facility: HOSPITAL | Age: 50
Setting detail: RECURRING SERIES
Discharge: HOME | End: 2025-08-15
Payer: COMMERCIAL

## 2025-08-12 VITALS — BODY MASS INDEX: 36.48 KG/M2 | WEIGHT: 269 LBS

## 2025-08-12 PROCEDURE — 93798 PHYS/QHP OP CAR RHAB W/ECG: CPT

## 2025-08-12 ASSESSMENT — EXERCISE STRESS TEST
PEAK_METS: 5.1
PEAK_HR: 96
PEAK_RPE: 12

## 2025-08-13 ENCOUNTER — APPOINTMENT (OUTPATIENT)
Facility: HOSPITAL | Age: 50
End: 2025-08-13
Payer: COMMERCIAL

## 2025-08-14 ENCOUNTER — APPOINTMENT (OUTPATIENT)
Facility: HOSPITAL | Age: 50
End: 2025-08-14
Payer: COMMERCIAL

## 2025-08-15 ENCOUNTER — TELEPHONE (OUTPATIENT)
Age: 50
End: 2025-08-15

## 2025-08-18 ENCOUNTER — APPOINTMENT (OUTPATIENT)
Facility: HOSPITAL | Age: 50
End: 2025-08-18
Payer: COMMERCIAL

## 2025-08-19 ENCOUNTER — APPOINTMENT (OUTPATIENT)
Facility: HOSPITAL | Age: 50
End: 2025-08-19
Payer: COMMERCIAL

## 2025-08-20 ENCOUNTER — APPOINTMENT (OUTPATIENT)
Facility: HOSPITAL | Age: 50
End: 2025-08-20
Payer: COMMERCIAL

## 2025-08-25 ENCOUNTER — APPOINTMENT (OUTPATIENT)
Facility: HOSPITAL | Age: 50
End: 2025-08-25
Payer: COMMERCIAL

## 2025-08-25 ENCOUNTER — HOSPITAL ENCOUNTER (EMERGENCY)
Facility: HOSPITAL | Age: 50
Discharge: ANOTHER HOSPITAL | End: 2025-08-25
Attending: EMERGENCY MEDICINE
Payer: COMMERCIAL

## 2025-08-25 ENCOUNTER — HOSPITAL ENCOUNTER (INPATIENT)
Facility: HOSPITAL | Age: 50
LOS: 3 days | Discharge: HOME | DRG: 871 | End: 2025-08-28
Attending: STUDENT IN AN ORGANIZED HEALTH CARE EDUCATION/TRAINING PROGRAM | Admitting: STUDENT IN AN ORGANIZED HEALTH CARE EDUCATION/TRAINING PROGRAM
Payer: COMMERCIAL

## 2025-08-25 VITALS
HEART RATE: 90 BPM | SYSTOLIC BLOOD PRESSURE: 133 MMHG | BODY MASS INDEX: 36.57 KG/M2 | DIASTOLIC BLOOD PRESSURE: 72 MMHG | OXYGEN SATURATION: 95 % | RESPIRATION RATE: 25 BRPM | HEIGHT: 72 IN | TEMPERATURE: 99.4 F | WEIGHT: 270 LBS

## 2025-08-25 DIAGNOSIS — R07.9 RIGHT-SIDED CHEST PAIN: ICD-10-CM

## 2025-08-25 DIAGNOSIS — D72.829 LEUKOCYTOSIS, UNSPECIFIED TYPE: ICD-10-CM

## 2025-08-25 DIAGNOSIS — I31.39 PERICARDIAL EFFUSION: Primary | ICD-10-CM

## 2025-08-25 DIAGNOSIS — J18.9 PNEUMONIA OF RIGHT LOWER LOBE DUE TO INFECTIOUS ORGANISM: ICD-10-CM

## 2025-08-25 LAB
ALBUMIN SERPL-MCNC: 3.7 G/DL (ref 3.5–5)
ALBUMIN/GLOB SERPL: 0.9 (ref 1.1–2.2)
ALP SERPL-CCNC: 73 U/L (ref 45–117)
ALT SERPL-CCNC: 29 U/L (ref 12–78)
ANION GAP SERPL CALC-SCNC: 7 MMOL/L (ref 2–12)
AST SERPL W P-5'-P-CCNC: 17 U/L (ref 15–37)
BASOPHILS # BLD: 0.13 K/UL (ref 0–0.1)
BASOPHILS NFR BLD: 0.7 % (ref 0–1)
BILIRUB SERPL-MCNC: 0.9 MG/DL (ref 0.2–1)
BUN SERPL-MCNC: 19 MG/DL (ref 6–20)
BUN/CREAT SERPL: 20 (ref 12–20)
CA-I BLD-MCNC: 10 MG/DL (ref 8.5–10.1)
CHLORIDE SERPL-SCNC: 103 MMOL/L (ref 97–108)
CO2 SERPL-SCNC: 28 MMOL/L (ref 21–32)
CREAT SERPL-MCNC: 0.94 MG/DL (ref 0.7–1.3)
DIFFERENTIAL METHOD BLD: ABNORMAL
EOSINOPHIL # BLD: 0.15 K/UL (ref 0–0.4)
EOSINOPHIL NFR BLD: 0.8 % (ref 0–7)
ERYTHROCYTE [DISTWIDTH] IN BLOOD BY AUTOMATED COUNT: 13.6 % (ref 11.5–14.5)
GLOBULIN SER CALC-MCNC: 4.2 G/DL (ref 2–4)
GLUCOSE SERPL-MCNC: 104 MG/DL (ref 65–100)
HCT VFR BLD AUTO: 44 % (ref 36.6–50.3)
HGB BLD-MCNC: 14.1 G/DL (ref 12.1–17)
IMM GRANULOCYTES # BLD AUTO: 0.15 K/UL (ref 0–0.04)
IMM GRANULOCYTES NFR BLD AUTO: 0.8 % (ref 0–0.5)
INR PPP: 1.1 (ref 0.9–1.1)
LACTATE BLD-SCNC: 0.92 MMOL/L (ref 0.4–2)
LYMPHOCYTES # BLD: 2.13 K/UL (ref 0.8–3.5)
LYMPHOCYTES NFR BLD: 10.7 % (ref 12–49)
MCH RBC QN AUTO: 27.6 PG (ref 26–34)
MCHC RBC AUTO-ENTMCNC: 32 G/DL (ref 30–36.5)
MCV RBC AUTO: 86.3 FL (ref 80–99)
MONOCYTES # BLD: 1.92 K/UL (ref 0–1)
MONOCYTES NFR BLD: 9.6 % (ref 5–13)
MRSA DNA SPEC QL NAA+PROBE: NOT DETECTED
NEUTS SEG # BLD: 15.44 K/UL (ref 1.8–8)
NEUTS SEG NFR BLD: 77.4 % (ref 32–75)
NRBC # BLD: 0 K/UL (ref 0–0.01)
NRBC BLD-RTO: 0 PER 100 WBC
PERFORMED BY:: NORMAL
PLATELET # BLD AUTO: 311 K/UL (ref 150–400)
PMV BLD AUTO: 11.2 FL (ref 8.9–12.9)
POTASSIUM SERPL-SCNC: 3.5 MMOL/L (ref 3.5–5.1)
PROT SERPL-MCNC: 7.9 G/DL (ref 6.4–8.2)
PROTHROMBIN TIME: 14.3 SEC (ref 11.9–14.1)
RBC # BLD AUTO: 5.1 M/UL (ref 4.1–5.7)
SODIUM SERPL-SCNC: 138 MMOL/L (ref 136–145)
TROPONIN I SERPL HS-MCNC: 10 NG/L (ref 0–76)
TROPONIN I SERPL HS-MCNC: 10 NG/L (ref 0–76)
WBC # BLD AUTO: 19.9 K/UL (ref 4.1–11.1)

## 2025-08-25 PROCEDURE — 85610 PROTHROMBIN TIME: CPT

## 2025-08-25 PROCEDURE — 96376 TX/PRO/DX INJ SAME DRUG ADON: CPT

## 2025-08-25 PROCEDURE — 96367 TX/PROPH/DG ADDL SEQ IV INF: CPT

## 2025-08-25 PROCEDURE — 71275 CT ANGIOGRAPHY CHEST: CPT

## 2025-08-25 PROCEDURE — 6360000004 HC RX CONTRAST MEDICATION: Performed by: EMERGENCY MEDICINE

## 2025-08-25 PROCEDURE — 80053 COMPREHEN METABOLIC PANEL: CPT

## 2025-08-25 PROCEDURE — 6360000002 HC RX W HCPCS: Performed by: EMERGENCY MEDICINE

## 2025-08-25 PROCEDURE — 96375 TX/PRO/DX INJ NEW DRUG ADDON: CPT

## 2025-08-25 PROCEDURE — 96365 THER/PROPH/DIAG IV INF INIT: CPT

## 2025-08-25 PROCEDURE — 71045 X-RAY EXAM CHEST 1 VIEW: CPT

## 2025-08-25 PROCEDURE — 87641 MR-STAPH DNA AMP PROBE: CPT

## 2025-08-25 PROCEDURE — 99285 EMERGENCY DEPT VISIT HI MDM: CPT

## 2025-08-25 PROCEDURE — 2580000003 HC RX 258: Performed by: EMERGENCY MEDICINE

## 2025-08-25 PROCEDURE — 93005 ELECTROCARDIOGRAM TRACING: CPT | Performed by: EMERGENCY MEDICINE

## 2025-08-25 PROCEDURE — 83605 ASSAY OF LACTIC ACID: CPT

## 2025-08-25 PROCEDURE — 87040 BLOOD CULTURE FOR BACTERIA: CPT

## 2025-08-25 PROCEDURE — 2060000000 HC ICU INTERMEDIATE R&B

## 2025-08-25 PROCEDURE — 36415 COLL VENOUS BLD VENIPUNCTURE: CPT

## 2025-08-25 PROCEDURE — 96366 THER/PROPH/DIAG IV INF ADDON: CPT

## 2025-08-25 PROCEDURE — 85025 COMPLETE CBC W/AUTO DIFF WBC: CPT

## 2025-08-25 PROCEDURE — 84484 ASSAY OF TROPONIN QUANT: CPT

## 2025-08-25 RX ORDER — IPRATROPIUM BROMIDE AND ALBUTEROL SULFATE 2.5; .5 MG/3ML; MG/3ML
1 SOLUTION RESPIRATORY (INHALATION) EVERY 4 HOURS PRN
Status: DISCONTINUED | OUTPATIENT
Start: 2025-08-25 | End: 2025-08-28 | Stop reason: HOSPADM

## 2025-08-25 RX ORDER — SODIUM CHLORIDE 0.9 % (FLUSH) 0.9 %
5-40 SYRINGE (ML) INJECTION EVERY 12 HOURS SCHEDULED
Status: DISCONTINUED | OUTPATIENT
Start: 2025-08-26 | End: 2025-08-28 | Stop reason: HOSPADM

## 2025-08-25 RX ORDER — ATORVASTATIN CALCIUM 40 MG/1
40 TABLET, FILM COATED ORAL NIGHTLY
Status: DISCONTINUED | OUTPATIENT
Start: 2025-08-26 | End: 2025-08-28 | Stop reason: HOSPADM

## 2025-08-25 RX ORDER — ACETAMINOPHEN 650 MG/1
650 SUPPOSITORY RECTAL EVERY 6 HOURS PRN
Status: DISCONTINUED | OUTPATIENT
Start: 2025-08-25 | End: 2025-08-28 | Stop reason: HOSPADM

## 2025-08-25 RX ORDER — ONDANSETRON 4 MG/1
4 TABLET, ORALLY DISINTEGRATING ORAL EVERY 8 HOURS PRN
Status: DISCONTINUED | OUTPATIENT
Start: 2025-08-25 | End: 2025-08-28 | Stop reason: HOSPADM

## 2025-08-25 RX ORDER — ONDANSETRON 2 MG/ML
4 INJECTION INTRAMUSCULAR; INTRAVENOUS EVERY 6 HOURS PRN
Status: DISCONTINUED | OUTPATIENT
Start: 2025-08-25 | End: 2025-08-28 | Stop reason: HOSPADM

## 2025-08-25 RX ORDER — ASPIRIN 81 MG/1
81 TABLET ORAL DAILY
Status: DISCONTINUED | OUTPATIENT
Start: 2025-08-26 | End: 2025-08-26

## 2025-08-25 RX ORDER — ALLOPURINOL 100 MG/1
300 TABLET ORAL DAILY
Status: DISCONTINUED | OUTPATIENT
Start: 2025-08-26 | End: 2025-08-28 | Stop reason: HOSPADM

## 2025-08-25 RX ORDER — IOPAMIDOL 755 MG/ML
100 INJECTION, SOLUTION INTRAVASCULAR
Status: COMPLETED | OUTPATIENT
Start: 2025-08-25 | End: 2025-08-25

## 2025-08-25 RX ORDER — LOSARTAN POTASSIUM AND HYDROCHLOROTHIAZIDE 25; 100 MG/1; MG/1
1 TABLET ORAL DAILY
Status: DISCONTINUED | OUTPATIENT
Start: 2025-08-26 | End: 2025-08-28 | Stop reason: HOSPADM

## 2025-08-25 RX ORDER — POTASSIUM CHLORIDE 7.45 MG/ML
10 INJECTION INTRAVENOUS PRN
Status: DISCONTINUED | OUTPATIENT
Start: 2025-08-25 | End: 2025-08-28 | Stop reason: HOSPADM

## 2025-08-25 RX ORDER — MORPHINE SULFATE 4 MG/ML
4 INJECTION, SOLUTION INTRAMUSCULAR; INTRAVENOUS
Status: COMPLETED | OUTPATIENT
Start: 2025-08-25 | End: 2025-08-25

## 2025-08-25 RX ORDER — POLYETHYLENE GLYCOL 3350 17 G/17G
17 POWDER, FOR SOLUTION ORAL DAILY PRN
Status: DISCONTINUED | OUTPATIENT
Start: 2025-08-25 | End: 2025-08-28 | Stop reason: HOSPADM

## 2025-08-25 RX ORDER — CLOPIDOGREL BISULFATE 75 MG/1
75 TABLET ORAL DAILY
Status: DISCONTINUED | OUTPATIENT
Start: 2025-08-26 | End: 2025-08-28 | Stop reason: HOSPADM

## 2025-08-25 RX ORDER — POTASSIUM CHLORIDE 1500 MG/1
40 TABLET, EXTENDED RELEASE ORAL PRN
Status: DISCONTINUED | OUTPATIENT
Start: 2025-08-25 | End: 2025-08-28 | Stop reason: HOSPADM

## 2025-08-25 RX ORDER — VALACYCLOVIR HYDROCHLORIDE 500 MG/1
500 TABLET, FILM COATED ORAL DAILY
Status: DISCONTINUED | OUTPATIENT
Start: 2025-08-26 | End: 2025-08-28 | Stop reason: HOSPADM

## 2025-08-25 RX ORDER — PANTOPRAZOLE SODIUM 40 MG/1
40 TABLET, DELAYED RELEASE ORAL DAILY
Status: DISCONTINUED | OUTPATIENT
Start: 2025-08-26 | End: 2025-08-28 | Stop reason: HOSPADM

## 2025-08-25 RX ORDER — SODIUM CHLORIDE 9 MG/ML
INJECTION, SOLUTION INTRAVENOUS PRN
Status: DISCONTINUED | OUTPATIENT
Start: 2025-08-25 | End: 2025-08-28 | Stop reason: HOSPADM

## 2025-08-25 RX ORDER — ACETAMINOPHEN 325 MG/1
650 TABLET ORAL EVERY 6 HOURS PRN
Status: DISCONTINUED | OUTPATIENT
Start: 2025-08-25 | End: 2025-08-28 | Stop reason: HOSPADM

## 2025-08-25 RX ORDER — METOPROLOL TARTRATE 25 MG/1
25 TABLET, FILM COATED ORAL 2 TIMES DAILY
Status: DISCONTINUED | OUTPATIENT
Start: 2025-08-26 | End: 2025-08-28 | Stop reason: HOSPADM

## 2025-08-25 RX ORDER — DOXYCYCLINE HYCLATE 100 MG
100 TABLET ORAL EVERY 12 HOURS SCHEDULED
Status: DISCONTINUED | OUTPATIENT
Start: 2025-08-26 | End: 2025-08-28 | Stop reason: HOSPADM

## 2025-08-25 RX ORDER — SODIUM CHLORIDE 0.9 % (FLUSH) 0.9 %
5-40 SYRINGE (ML) INJECTION PRN
Status: DISCONTINUED | OUTPATIENT
Start: 2025-08-25 | End: 2025-08-28 | Stop reason: HOSPADM

## 2025-08-25 RX ORDER — METOPROLOL TARTRATE 1 MG/ML
5 INJECTION, SOLUTION INTRAVENOUS
Status: COMPLETED | OUTPATIENT
Start: 2025-08-25 | End: 2025-08-25

## 2025-08-25 RX ORDER — OXYCODONE HYDROCHLORIDE 5 MG/1
5 TABLET ORAL EVERY 4 HOURS PRN
Refills: 0 | Status: DISCONTINUED | OUTPATIENT
Start: 2025-08-25 | End: 2025-08-28 | Stop reason: HOSPADM

## 2025-08-25 RX ORDER — MAGNESIUM SULFATE IN WATER 40 MG/ML
2000 INJECTION, SOLUTION INTRAVENOUS PRN
Status: DISCONTINUED | OUTPATIENT
Start: 2025-08-25 | End: 2025-08-28 | Stop reason: HOSPADM

## 2025-08-25 RX ADMIN — METOPROLOL TARTRATE 5 MG: 1 INJECTION, SOLUTION INTRAVENOUS at 16:53

## 2025-08-25 RX ADMIN — SODIUM CHLORIDE 2500 MG: 0.9 INJECTION, SOLUTION INTRAVENOUS at 18:30

## 2025-08-25 RX ADMIN — IOPAMIDOL 100 ML: 755 INJECTION, SOLUTION INTRAVENOUS at 15:48

## 2025-08-25 RX ADMIN — MORPHINE SULFATE 4 MG: 4 INJECTION, SOLUTION INTRAMUSCULAR; INTRAVENOUS at 14:48

## 2025-08-25 RX ADMIN — PIPERACILLIN AND TAZOBACTAM 4500 MG: 4; .5 INJECTION, POWDER, FOR SOLUTION INTRAVENOUS at 17:55

## 2025-08-25 RX ADMIN — MORPHINE SULFATE 4 MG: 4 INJECTION, SOLUTION INTRAMUSCULAR; INTRAVENOUS at 20:43

## 2025-08-25 ASSESSMENT — HEART SCORE: ECG: NON-SPECIFC REPOLARIZATION DISTURBANCE/LBTB/PM

## 2025-08-25 ASSESSMENT — PAIN SCALES - GENERAL
PAINLEVEL_OUTOF10: 7
PAINLEVEL_OUTOF10: 6
PAINLEVEL_OUTOF10: 3

## 2025-08-25 ASSESSMENT — LIFESTYLE VARIABLES
HOW MANY STANDARD DRINKS CONTAINING ALCOHOL DO YOU HAVE ON A TYPICAL DAY: PATIENT DOES NOT DRINK
HOW OFTEN DO YOU HAVE A DRINK CONTAINING ALCOHOL: NEVER

## 2025-08-25 ASSESSMENT — PAIN DESCRIPTION - LOCATION
LOCATION: CHEST
LOCATION: CHEST

## 2025-08-25 ASSESSMENT — PAIN - FUNCTIONAL ASSESSMENT
PAIN_FUNCTIONAL_ASSESSMENT: 0-10
PAIN_FUNCTIONAL_ASSESSMENT: 0-10

## 2025-08-26 ENCOUNTER — APPOINTMENT (OUTPATIENT)
Facility: HOSPITAL | Age: 50
End: 2025-08-26
Payer: COMMERCIAL

## 2025-08-26 ENCOUNTER — HOSPITAL ENCOUNTER (OUTPATIENT)
Facility: HOSPITAL | Age: 50
Setting detail: RECURRING SERIES
End: 2025-08-26
Payer: COMMERCIAL

## 2025-08-26 ENCOUNTER — APPOINTMENT (OUTPATIENT)
Facility: HOSPITAL | Age: 50
DRG: 871 | End: 2025-08-26
Attending: STUDENT IN AN ORGANIZED HEALTH CARE EDUCATION/TRAINING PROGRAM
Payer: COMMERCIAL

## 2025-08-26 PROBLEM — I31.39 PERICARDIAL EFFUSION: Status: ACTIVE | Noted: 2025-08-26

## 2025-08-26 LAB
ANION GAP SERPL CALC-SCNC: 11 MMOL/L (ref 2–14)
BASOPHILS # BLD: 0.12 K/UL (ref 0–0.1)
BASOPHILS NFR BLD: 0.8 % (ref 0–1)
BUN SERPL-MCNC: 12 MG/DL (ref 6–20)
BUN/CREAT SERPL: 14 (ref 12–20)
CALCIUM SERPL-MCNC: 9 MG/DL (ref 8.6–10)
CHLORIDE SERPL-SCNC: 100 MMOL/L (ref 98–107)
CO2 SERPL-SCNC: 24 MMOL/L (ref 20–29)
CREAT SERPL-MCNC: 0.84 MG/DL (ref 0.7–1.2)
CRP SERPL-MCNC: 19.5 MG/DL (ref 0–0.5)
DIFFERENTIAL METHOD BLD: ABNORMAL
ECHO AO ASC DIAM: 4.1 CM
ECHO AO ASCENDING AORTA INDEX: 1.69 CM/M2
ECHO AO ROOT DIAM: 3.7 CM
ECHO AO ROOT INDEX: 1.52 CM/M2
ECHO AV AREA PEAK VELOCITY: 3.6 CM2
ECHO AV AREA PEAK VELOCITY: 3.6 CM2
ECHO AV AREA PEAK VELOCITY: 3.9 CM2
ECHO AV AREA PEAK VELOCITY: 3.9 CM2
ECHO AV AREA VTI: 3.2 CM2
ECHO AV AREA/BSA VTI: 1.3 CM2/M2
ECHO AV MEAN GRADIENT: 4 MMHG
ECHO AV MEAN VELOCITY: 1 M/S
ECHO AV PEAK GRADIENT: 8 MMHG
ECHO AV PEAK GRADIENT: 9 MMHG
ECHO AV PEAK VELOCITY: 1.4 M/S
ECHO AV PEAK VELOCITY: 1.5 M/S
ECHO AV VTI: 26.1 CM
ECHO BSA: 2.5 M2
ECHO LA DIAMETER INDEX: 1.98 CM/M2
ECHO LA DIAMETER: 4.8 CM
ECHO LA TO AORTIC ROOT RATIO: 1.3
ECHO LA VOL A-L A2C: 50 ML (ref 18–58)
ECHO LA VOL A-L A4C: 66 ML (ref 18–58)
ECHO LA VOL MOD A2C: 45 ML (ref 18–58)
ECHO LA VOL MOD A4C: 62 ML (ref 18–58)
ECHO LA VOLUME AREA LENGTH: 62 ML
ECHO LA VOLUME INDEX A-L A2C: 21 ML/M2 (ref 16–34)
ECHO LA VOLUME INDEX A-L A4C: 27 ML/M2 (ref 16–34)
ECHO LA VOLUME INDEX AREA LENGTH: 26 ML/M2 (ref 16–34)
ECHO LA VOLUME INDEX MOD A2C: 19 ML/M2 (ref 16–34)
ECHO LA VOLUME INDEX MOD A4C: 26 ML/M2 (ref 16–34)
ECHO LV E' LATERAL VELOCITY: 13.87 CM/S
ECHO LV E' SEPTAL VELOCITY: 6.92 CM/S
ECHO LV EF PHYSICIAN: 55 %
ECHO LV FRACTIONAL SHORTENING: 31 % (ref 28–44)
ECHO LV INTERNAL DIMENSION DIASTOLE INDEX: 1.85 CM/M2
ECHO LV INTERNAL DIMENSION DIASTOLIC: 4.5 CM (ref 4.2–5.9)
ECHO LV INTERNAL DIMENSION SYSTOLIC INDEX: 1.28 CM/M2
ECHO LV INTERNAL DIMENSION SYSTOLIC: 3.1 CM
ECHO LV ISOVOLUMETRIC RELAXATION TIME (IVRT): 64.7 MS
ECHO LV IVSD: 1.2 CM (ref 0.6–1)
ECHO LV MASS 2D: 186.4 G (ref 88–224)
ECHO LV MASS INDEX 2D: 76.7 G/M2 (ref 49–115)
ECHO LV POSTERIOR WALL DIASTOLIC: 1.1 CM (ref 0.6–1)
ECHO LV RELATIVE WALL THICKNESS RATIO: 0.49
ECHO LVOT AREA: 5.7 CM2
ECHO LVOT AV VTI INDEX: 0.58
ECHO LVOT DIAM: 2.7 CM
ECHO LVOT MEAN GRADIENT: 2 MMHG
ECHO LVOT PEAK GRADIENT: 4 MMHG
ECHO LVOT PEAK GRADIENT: 4 MMHG
ECHO LVOT PEAK VELOCITY: 1 M/S
ECHO LVOT PEAK VELOCITY: 1 M/S
ECHO LVOT STROKE VOLUME INDEX: 35.6 ML/M2
ECHO LVOT SV: 86.4 ML
ECHO LVOT VTI: 15.1 CM
ECHO MV A VELOCITY: 0.69 M/S
ECHO MV E DECELERATION TIME (DT): 166.6 MS
ECHO MV E VELOCITY: 1.15 M/S
ECHO MV E/A RATIO: 1.67
ECHO MV E/E' LATERAL: 8.29
ECHO MV E/E' RATIO (AVERAGED): 12.45
ECHO MV E/E' SEPTAL: 16.62
ECHO PULMONARY ARTERY END DIASTOLIC PRESSURE: 4 MMHG
ECHO PV REGURGITANT MAX VELOCITY: 1 M/S
ECHO RA VOLUME: 78 ML
ECHO RA VOLUME: 83 ML
ECHO RV FREE WALL PEAK S': 6 CM/S
ECHO RV LONGITUDINAL DIMENSION: 5.2 CM
ECHO RV MID DIMENSION: 3.6 CM
ECHO TV REGURGITANT MAX VELOCITY: 2.59 M/S
ECHO TV REGURGITANT PEAK GRADIENT: 27 MMHG
EKG ATRIAL RATE: 300 BPM
EKG ATRIAL RATE: 94 BPM
EKG DIAGNOSIS: NORMAL
EKG DIAGNOSIS: NORMAL
EKG P AXIS: -12 DEGREES
EKG P AXIS: 24 DEGREES
EKG P-R INTERVAL: 170 MS
EKG Q-T INTERVAL: 260 MS
EKG Q-T INTERVAL: 380 MS
EKG QRS DURATION: 100 MS
EKG QRS DURATION: 80 MS
EKG QTC CALCULATION (BAZETT): 398 MS
EKG QTC CALCULATION (BAZETT): 475 MS
EKG R AXIS: -16 DEGREES
EKG R AXIS: -21 DEGREES
EKG T AXIS: 120 DEGREES
EKG T AXIS: 80 DEGREES
EKG VENTRICULAR RATE: 141 BPM
EKG VENTRICULAR RATE: 94 BPM
EOSINOPHIL # BLD: 0.19 K/UL (ref 0–0.4)
EOSINOPHIL NFR BLD: 1.2 % (ref 0–7)
ERYTHROCYTE [DISTWIDTH] IN BLOOD BY AUTOMATED COUNT: 13.7 % (ref 11.5–14.5)
GLUCOSE SERPL-MCNC: 142 MG/DL (ref 65–100)
HCT VFR BLD AUTO: 40.9 % (ref 36.6–50.3)
HGB BLD-MCNC: 13.1 G/DL (ref 12.1–17)
IMM GRANULOCYTES # BLD AUTO: 0.11 K/UL (ref 0–0.04)
IMM GRANULOCYTES NFR BLD AUTO: 0.7 % (ref 0–0.5)
LYMPHOCYTES # BLD: 2.4 K/UL (ref 0.8–3.5)
LYMPHOCYTES NFR BLD: 15.4 % (ref 12–49)
MAGNESIUM SERPL-MCNC: 2 MG/DL (ref 1.6–2.6)
MCH RBC QN AUTO: 28 PG (ref 26–34)
MCHC RBC AUTO-ENTMCNC: 32 G/DL (ref 30–36.5)
MCV RBC AUTO: 87.4 FL (ref 80–99)
MONOCYTES # BLD: 1.89 K/UL (ref 0–1)
MONOCYTES NFR BLD: 12.1 % (ref 5–13)
NEUTS SEG # BLD: 10.86 K/UL (ref 1.8–8)
NEUTS SEG NFR BLD: 69.8 % (ref 32–75)
NRBC # BLD: 0 K/UL (ref 0–0.01)
NRBC BLD-RTO: 0 PER 100 WBC
PLATELET # BLD AUTO: 277 K/UL (ref 150–400)
PMV BLD AUTO: 11 FL (ref 8.9–12.9)
POTASSIUM SERPL-SCNC: 3.2 MMOL/L (ref 3.5–5.1)
PROCALCITONIN SERPL-MCNC: 0.09 NG/ML
RBC # BLD AUTO: 4.68 M/UL (ref 4.1–5.7)
SODIUM SERPL-SCNC: 135 MMOL/L (ref 136–145)
TSH, 3RD GENERATION: 0.94 UIU/ML (ref 0.27–4.2)
WBC # BLD AUTO: 15.6 K/UL (ref 4.1–11.1)

## 2025-08-26 PROCEDURE — 2580000003 HC RX 258: Performed by: STUDENT IN AN ORGANIZED HEALTH CARE EDUCATION/TRAINING PROGRAM

## 2025-08-26 PROCEDURE — 99253 IP/OBS CNSLTJ NEW/EST LOW 45: CPT

## 2025-08-26 PROCEDURE — 6370000000 HC RX 637 (ALT 250 FOR IP): Performed by: STUDENT IN AN ORGANIZED HEALTH CARE EDUCATION/TRAINING PROGRAM

## 2025-08-26 PROCEDURE — 94761 N-INVAS EAR/PLS OXIMETRY MLT: CPT

## 2025-08-26 PROCEDURE — 6360000002 HC RX W HCPCS: Performed by: STUDENT IN AN ORGANIZED HEALTH CARE EDUCATION/TRAINING PROGRAM

## 2025-08-26 PROCEDURE — 84145 PROCALCITONIN (PCT): CPT

## 2025-08-26 PROCEDURE — 86738 MYCOPLASMA ANTIBODY: CPT

## 2025-08-26 PROCEDURE — 6370000000 HC RX 637 (ALT 250 FOR IP)

## 2025-08-26 PROCEDURE — 2060000000 HC ICU INTERMEDIATE R&B

## 2025-08-26 PROCEDURE — 84443 ASSAY THYROID STIM HORMONE: CPT

## 2025-08-26 PROCEDURE — 93325 DOPPLER ECHO COLOR FLOW MAPG: CPT

## 2025-08-26 PROCEDURE — 80048 BASIC METABOLIC PNL TOTAL CA: CPT

## 2025-08-26 PROCEDURE — 86140 C-REACTIVE PROTEIN: CPT

## 2025-08-26 PROCEDURE — 94640 AIRWAY INHALATION TREATMENT: CPT

## 2025-08-26 PROCEDURE — 2500000003 HC RX 250 WO HCPCS: Performed by: STUDENT IN AN ORGANIZED HEALTH CARE EDUCATION/TRAINING PROGRAM

## 2025-08-26 PROCEDURE — 36415 COLL VENOUS BLD VENIPUNCTURE: CPT

## 2025-08-26 PROCEDURE — 87899 AGENT NOS ASSAY W/OPTIC: CPT

## 2025-08-26 PROCEDURE — 83735 ASSAY OF MAGNESIUM: CPT

## 2025-08-26 PROCEDURE — 87449 NOS EACH ORGANISM AG IA: CPT

## 2025-08-26 PROCEDURE — 85025 COMPLETE CBC W/AUTO DIFF WBC: CPT

## 2025-08-26 RX ORDER — POTASSIUM CHLORIDE 1500 MG/1
40 TABLET, EXTENDED RELEASE ORAL DAILY
Status: DISCONTINUED | OUTPATIENT
Start: 2025-08-26 | End: 2025-08-28 | Stop reason: HOSPADM

## 2025-08-26 RX ORDER — COLCHICINE 0.6 MG/1
0.6 TABLET ORAL DAILY
Status: DISCONTINUED | OUTPATIENT
Start: 2025-08-26 | End: 2025-08-26

## 2025-08-26 RX ORDER — COLCHICINE 0.6 MG/1
0.6 TABLET ORAL 2 TIMES DAILY
Status: DISCONTINUED | OUTPATIENT
Start: 2025-08-26 | End: 2025-08-28 | Stop reason: HOSPADM

## 2025-08-26 RX ORDER — LANOLIN ALCOHOL/MO/W.PET/CERES
400 CREAM (GRAM) TOPICAL 2 TIMES DAILY
Status: DISCONTINUED | OUTPATIENT
Start: 2025-08-26 | End: 2025-08-28 | Stop reason: HOSPADM

## 2025-08-26 RX ORDER — ASPIRIN 325 MG
975 TABLET ORAL 3 TIMES DAILY
Status: DISCONTINUED | OUTPATIENT
Start: 2025-08-26 | End: 2025-08-28 | Stop reason: HOSPADM

## 2025-08-26 RX ORDER — AMIODARONE HYDROCHLORIDE 200 MG/1
400 TABLET ORAL 2 TIMES DAILY
Status: DISCONTINUED | OUTPATIENT
Start: 2025-08-26 | End: 2025-08-28 | Stop reason: HOSPADM

## 2025-08-26 RX ORDER — AMIODARONE HYDROCHLORIDE 200 MG/1
200 TABLET ORAL DAILY
Status: DISCONTINUED | OUTPATIENT
Start: 2025-09-06 | End: 2025-08-28 | Stop reason: HOSPADM

## 2025-08-26 RX ADMIN — ALLOPURINOL 300 MG: 100 TABLET ORAL at 08:45

## 2025-08-26 RX ADMIN — OXYCODONE HYDROCHLORIDE 5 MG: 5 TABLET ORAL at 00:33

## 2025-08-26 RX ADMIN — PIPERACILLIN AND TAZOBACTAM 3375 MG: 3; .375 INJECTION, POWDER, LYOPHILIZED, FOR SOLUTION INTRAVENOUS at 00:27

## 2025-08-26 RX ADMIN — METOPROLOL TARTRATE 12.5 MG: 25 TABLET, FILM COATED ORAL at 20:13

## 2025-08-26 RX ADMIN — AMIODARONE HYDROCHLORIDE 400 MG: 200 TABLET ORAL at 20:14

## 2025-08-26 RX ADMIN — VANCOMYCIN HYDROCHLORIDE 1000 MG: 1 INJECTION, POWDER, LYOPHILIZED, FOR SOLUTION INTRAVENOUS at 03:31

## 2025-08-26 RX ADMIN — ATORVASTATIN CALCIUM 40 MG: 40 TABLET, FILM COATED ORAL at 20:14

## 2025-08-26 RX ADMIN — SODIUM CHLORIDE: 0.9 INJECTION, SOLUTION INTRAVENOUS at 23:45

## 2025-08-26 RX ADMIN — DOXYCYCLINE HYCLATE 100 MG: 100 TABLET, FILM COATED ORAL at 20:13

## 2025-08-26 RX ADMIN — METOPROLOL TARTRATE 25 MG: 25 TABLET, FILM COATED ORAL at 08:45

## 2025-08-26 RX ADMIN — VANCOMYCIN HYDROCHLORIDE 1000 MG: 1 INJECTION, POWDER, LYOPHILIZED, FOR SOLUTION INTRAVENOUS at 11:42

## 2025-08-26 RX ADMIN — ASPIRIN 975 MG: 325 TABLET ORAL at 17:39

## 2025-08-26 RX ADMIN — POTASSIUM BICARBONATE 40 MEQ: 782 TABLET, EFFERVESCENT ORAL at 06:32

## 2025-08-26 RX ADMIN — SODIUM CHLORIDE: 0.9 INJECTION, SOLUTION INTRAVENOUS at 00:26

## 2025-08-26 RX ADMIN — COLCHICINE 0.6 MG: 0.6 TABLET, FILM COATED ORAL at 20:12

## 2025-08-26 RX ADMIN — PIPERACILLIN AND TAZOBACTAM 3375 MG: 3; .375 INJECTION, POWDER, LYOPHILIZED, FOR SOLUTION INTRAVENOUS at 23:45

## 2025-08-26 RX ADMIN — PIPERACILLIN AND TAZOBACTAM 3375 MG: 3; .375 INJECTION, POWDER, LYOPHILIZED, FOR SOLUTION INTRAVENOUS at 08:51

## 2025-08-26 RX ADMIN — ASPIRIN 975 MG: 325 TABLET ORAL at 20:12

## 2025-08-26 RX ADMIN — PIPERACILLIN AND TAZOBACTAM 3375 MG: 3; .375 INJECTION, POWDER, LYOPHILIZED, FOR SOLUTION INTRAVENOUS at 16:02

## 2025-08-26 RX ADMIN — Medication 400 MG: at 20:13

## 2025-08-26 RX ADMIN — METOPROLOL TARTRATE 12.5 MG: 25 TABLET, FILM COATED ORAL at 00:16

## 2025-08-26 RX ADMIN — COLCHICINE 0.6 MG: 0.6 TABLET, FILM COATED ORAL at 11:36

## 2025-08-26 RX ADMIN — VANCOMYCIN HYDROCHLORIDE 1000 MG: 1 INJECTION, POWDER, LYOPHILIZED, FOR SOLUTION INTRAVENOUS at 20:09

## 2025-08-26 RX ADMIN — ASPIRIN 81 MG: 81 TABLET, DELAYED RELEASE ORAL at 08:45

## 2025-08-26 RX ADMIN — SODIUM CHLORIDE, PRESERVATIVE FREE 10 ML: 5 INJECTION INTRAVENOUS at 20:09

## 2025-08-26 RX ADMIN — DOXYCYCLINE HYCLATE 100 MG: 100 TABLET, FILM COATED ORAL at 08:45

## 2025-08-26 RX ADMIN — PANTOPRAZOLE SODIUM 40 MG: 40 TABLET, DELAYED RELEASE ORAL at 08:45

## 2025-08-26 RX ADMIN — VALACYCLOVIR HYDROCHLORIDE 500 MG: 500 TABLET, FILM COATED ORAL at 08:45

## 2025-08-26 RX ADMIN — ACETAMINOPHEN 650 MG: 325 TABLET ORAL at 15:57

## 2025-08-26 RX ADMIN — IPRATROPIUM BROMIDE AND ALBUTEROL SULFATE 1 DOSE: .5; 3 SOLUTION RESPIRATORY (INHALATION) at 10:44

## 2025-08-26 RX ADMIN — CLOPIDOGREL BISULFATE 75 MG: 75 TABLET, FILM COATED ORAL at 08:45

## 2025-08-26 ASSESSMENT — PAIN DESCRIPTION - LOCATION
LOCATION: CHEST
LOCATION: CHEST

## 2025-08-26 ASSESSMENT — PAIN - FUNCTIONAL ASSESSMENT
PAIN_FUNCTIONAL_ASSESSMENT: ACTIVITIES ARE NOT PREVENTED
PAIN_FUNCTIONAL_ASSESSMENT: 0-10
PAIN_FUNCTIONAL_ASSESSMENT: ACTIVITIES ARE NOT PREVENTED
PAIN_FUNCTIONAL_ASSESSMENT: 0-10

## 2025-08-26 ASSESSMENT — PAIN DESCRIPTION - ORIENTATION
ORIENTATION: ANTERIOR
ORIENTATION: ANTERIOR

## 2025-08-26 ASSESSMENT — PAIN SCALES - GENERAL
PAINLEVEL_OUTOF10: 0
PAINLEVEL_OUTOF10: 5
PAINLEVEL_OUTOF10: 0
PAINLEVEL_OUTOF10: 0
PAINLEVEL_OUTOF10: 5

## 2025-08-26 ASSESSMENT — PAIN DESCRIPTION - DESCRIPTORS
DESCRIPTORS: ACHING
DESCRIPTORS: ACHING

## 2025-08-27 ENCOUNTER — APPOINTMENT (OUTPATIENT)
Facility: HOSPITAL | Age: 50
End: 2025-08-27
Payer: COMMERCIAL

## 2025-08-27 LAB
ANION GAP SERPL CALC-SCNC: 11 MMOL/L (ref 2–14)
BACTERIA SPEC CULT: NORMAL
BACTERIA SPEC CULT: NORMAL
BUN SERPL-MCNC: 15 MG/DL (ref 6–20)
BUN/CREAT SERPL: 22 (ref 12–20)
CALCIUM SERPL-MCNC: 8.8 MG/DL (ref 8.6–10)
CHLORIDE SERPL-SCNC: 105 MMOL/L (ref 98–107)
CO2 SERPL-SCNC: 22 MMOL/L (ref 20–29)
CREAT SERPL-MCNC: 0.71 MG/DL (ref 0.7–1.2)
ERYTHROCYTE [DISTWIDTH] IN BLOOD BY AUTOMATED COUNT: 13.4 % (ref 11.5–14.5)
FLUID CULTURE: NORMAL
GLUCOSE SERPL-MCNC: 141 MG/DL (ref 65–100)
HCT VFR BLD AUTO: 36.9 % (ref 36.6–50.3)
HGB BLD-MCNC: 11.8 G/DL (ref 12.1–17)
L PNEUMO1 AG UR QL IA: NEGATIVE
Lab: NORMAL
M PNEUMO IGG SER IA-ACNC: <100 U/ML (ref 0–99)
M PNEUMO IGM SER IA-ACNC: <770 U/ML (ref 0–769)
MCH RBC QN AUTO: 28.1 PG (ref 26–34)
MCHC RBC AUTO-ENTMCNC: 32 G/DL (ref 30–36.5)
MCV RBC AUTO: 87.9 FL (ref 80–99)
NRBC # BLD: 0 K/UL (ref 0–0.01)
NRBC BLD-RTO: 0 PER 100 WBC
ORGANISM ID: NORMAL
PLATELET # BLD AUTO: 256 K/UL (ref 150–400)
PMV BLD AUTO: 11 FL (ref 8.9–12.9)
POTASSIUM SERPL-SCNC: 3.5 MMOL/L (ref 3.5–5.1)
RBC # BLD AUTO: 4.2 M/UL (ref 4.1–5.7)
S PNEUM AG SPEC QL LA: NEGATIVE
SERVICE CMNT-IMP: NORMAL
SODIUM SERPL-SCNC: 138 MMOL/L (ref 136–145)
SPECIMEN SOURCE: NORMAL
SPECIMEN SOURCE: NORMAL
SPECIMEN: NORMAL
VANCOMYCIN SERPL-MCNC: 4.8 UG/ML
WBC # BLD AUTO: 15.8 K/UL (ref 4.1–11.1)

## 2025-08-27 PROCEDURE — 2500000003 HC RX 250 WO HCPCS: Performed by: STUDENT IN AN ORGANIZED HEALTH CARE EDUCATION/TRAINING PROGRAM

## 2025-08-27 PROCEDURE — 6360000002 HC RX W HCPCS: Performed by: STUDENT IN AN ORGANIZED HEALTH CARE EDUCATION/TRAINING PROGRAM

## 2025-08-27 PROCEDURE — 6370000000 HC RX 637 (ALT 250 FOR IP): Performed by: STUDENT IN AN ORGANIZED HEALTH CARE EDUCATION/TRAINING PROGRAM

## 2025-08-27 PROCEDURE — 94640 AIRWAY INHALATION TREATMENT: CPT

## 2025-08-27 PROCEDURE — 80202 ASSAY OF VANCOMYCIN: CPT

## 2025-08-27 PROCEDURE — 2580000003 HC RX 258: Performed by: STUDENT IN AN ORGANIZED HEALTH CARE EDUCATION/TRAINING PROGRAM

## 2025-08-27 PROCEDURE — 6370000000 HC RX 637 (ALT 250 FOR IP): Performed by: HOSPITALIST

## 2025-08-27 PROCEDURE — 36415 COLL VENOUS BLD VENIPUNCTURE: CPT

## 2025-08-27 PROCEDURE — 80048 BASIC METABOLIC PNL TOTAL CA: CPT

## 2025-08-27 PROCEDURE — 85027 COMPLETE CBC AUTOMATED: CPT

## 2025-08-27 PROCEDURE — 2580000003 HC RX 258: Performed by: INTERNAL MEDICINE

## 2025-08-27 PROCEDURE — 2060000000 HC ICU INTERMEDIATE R&B

## 2025-08-27 PROCEDURE — 6360000002 HC RX W HCPCS: Performed by: INTERNAL MEDICINE

## 2025-08-27 RX ADMIN — PIPERACILLIN AND TAZOBACTAM 3375 MG: 3; .375 INJECTION, POWDER, LYOPHILIZED, FOR SOLUTION INTRAVENOUS at 17:21

## 2025-08-27 RX ADMIN — IPRATROPIUM BROMIDE AND ALBUTEROL SULFATE 1 DOSE: .5; 3 SOLUTION RESPIRATORY (INHALATION) at 03:23

## 2025-08-27 RX ADMIN — ATORVASTATIN CALCIUM 40 MG: 40 TABLET, FILM COATED ORAL at 21:10

## 2025-08-27 RX ADMIN — DOXYCYCLINE HYCLATE 100 MG: 100 TABLET, FILM COATED ORAL at 21:10

## 2025-08-27 RX ADMIN — POTASSIUM CHLORIDE 40 MEQ: 1500 TABLET, EXTENDED RELEASE ORAL at 08:15

## 2025-08-27 RX ADMIN — PIPERACILLIN AND TAZOBACTAM 3375 MG: 3; .375 INJECTION, POWDER, LYOPHILIZED, FOR SOLUTION INTRAVENOUS at 08:22

## 2025-08-27 RX ADMIN — ASPIRIN 975 MG: 325 TABLET ORAL at 15:10

## 2025-08-27 RX ADMIN — SODIUM CHLORIDE 1500 MG: 0.9 INJECTION, SOLUTION INTRAVENOUS at 22:40

## 2025-08-27 RX ADMIN — Medication 400 MG: at 21:10

## 2025-08-27 RX ADMIN — CLOPIDOGREL BISULFATE 75 MG: 75 TABLET, FILM COATED ORAL at 08:16

## 2025-08-27 RX ADMIN — ACETAMINOPHEN 650 MG: 325 TABLET ORAL at 15:10

## 2025-08-27 RX ADMIN — SODIUM CHLORIDE 1500 MG: 0.9 INJECTION, SOLUTION INTRAVENOUS at 15:13

## 2025-08-27 RX ADMIN — COLCHICINE 0.6 MG: 0.6 TABLET, FILM COATED ORAL at 21:10

## 2025-08-27 RX ADMIN — VANCOMYCIN HYDROCHLORIDE 1000 MG: 1 INJECTION, POWDER, LYOPHILIZED, FOR SOLUTION INTRAVENOUS at 02:54

## 2025-08-27 RX ADMIN — METOPROLOL TARTRATE 25 MG: 25 TABLET, FILM COATED ORAL at 21:10

## 2025-08-27 RX ADMIN — SODIUM CHLORIDE, PRESERVATIVE FREE 10 ML: 5 INJECTION INTRAVENOUS at 21:17

## 2025-08-27 RX ADMIN — PANTOPRAZOLE SODIUM 40 MG: 40 TABLET, DELAYED RELEASE ORAL at 08:16

## 2025-08-27 RX ADMIN — ASPIRIN 975 MG: 325 TABLET ORAL at 08:16

## 2025-08-27 RX ADMIN — ACETAMINOPHEN 650 MG: 325 TABLET ORAL at 04:00

## 2025-08-27 RX ADMIN — DOXYCYCLINE HYCLATE 100 MG: 100 TABLET, FILM COATED ORAL at 08:16

## 2025-08-27 RX ADMIN — AMIODARONE HYDROCHLORIDE 400 MG: 200 TABLET ORAL at 08:15

## 2025-08-27 RX ADMIN — AMIODARONE HYDROCHLORIDE 400 MG: 200 TABLET ORAL at 21:10

## 2025-08-27 RX ADMIN — Medication 400 MG: at 08:15

## 2025-08-27 RX ADMIN — VALACYCLOVIR HYDROCHLORIDE 500 MG: 500 TABLET, FILM COATED ORAL at 08:15

## 2025-08-27 RX ADMIN — ALLOPURINOL 300 MG: 100 TABLET ORAL at 08:16

## 2025-08-27 RX ADMIN — COLCHICINE 0.6 MG: 0.6 TABLET, FILM COATED ORAL at 08:16

## 2025-08-27 RX ADMIN — METOPROLOL TARTRATE 25 MG: 25 TABLET, FILM COATED ORAL at 08:16

## 2025-08-27 RX ADMIN — IPRATROPIUM BROMIDE AND ALBUTEROL SULFATE 1 DOSE: .5; 3 SOLUTION RESPIRATORY (INHALATION) at 22:52

## 2025-08-27 RX ADMIN — ASPIRIN 975 MG: 325 TABLET ORAL at 21:11

## 2025-08-27 ASSESSMENT — PAIN - FUNCTIONAL ASSESSMENT
PAIN_FUNCTIONAL_ASSESSMENT: ACTIVITIES ARE NOT PREVENTED
PAIN_FUNCTIONAL_ASSESSMENT: 0-10
PAIN_FUNCTIONAL_ASSESSMENT: ACTIVITIES ARE NOT PREVENTED

## 2025-08-27 ASSESSMENT — PAIN DESCRIPTION - ORIENTATION
ORIENTATION: LEFT
ORIENTATION: LEFT

## 2025-08-27 ASSESSMENT — PAIN DESCRIPTION - LOCATION
LOCATION: CHEST
LOCATION: CHEST

## 2025-08-27 ASSESSMENT — PAIN DESCRIPTION - DESCRIPTORS
DESCRIPTORS: ACHING
DESCRIPTORS: ACHING

## 2025-08-27 ASSESSMENT — PAIN SCALES - GENERAL
PAINLEVEL_OUTOF10: 2
PAINLEVEL_OUTOF10: 4
PAINLEVEL_OUTOF10: 1

## 2025-08-28 VITALS
SYSTOLIC BLOOD PRESSURE: 148 MMHG | RESPIRATION RATE: 18 BRPM | BODY MASS INDEX: 37 KG/M2 | DIASTOLIC BLOOD PRESSURE: 94 MMHG | HEART RATE: 63 BPM | TEMPERATURE: 98 F | HEIGHT: 72 IN | WEIGHT: 273.15 LBS | OXYGEN SATURATION: 95 %

## 2025-08-28 LAB
BASOPHILS # BLD: 0.13 K/UL (ref 0–0.1)
BASOPHILS NFR BLD: 1 % (ref 0–1)
DIFFERENTIAL METHOD BLD: ABNORMAL
EOSINOPHIL # BLD: 0.43 K/UL (ref 0–0.4)
EOSINOPHIL NFR BLD: 3.4 % (ref 0–7)
ERYTHROCYTE [DISTWIDTH] IN BLOOD BY AUTOMATED COUNT: 13.5 % (ref 11.5–14.5)
HCT VFR BLD AUTO: 38 % (ref 36.6–50.3)
HGB BLD-MCNC: 12.1 G/DL (ref 12.1–17)
IMM GRANULOCYTES # BLD AUTO: 0.05 K/UL (ref 0–0.04)
IMM GRANULOCYTES NFR BLD AUTO: 0.4 % (ref 0–0.5)
LYMPHOCYTES # BLD: 1.93 K/UL (ref 0.8–3.5)
LYMPHOCYTES NFR BLD: 15.2 % (ref 12–49)
MCH RBC QN AUTO: 27.6 PG (ref 26–34)
MCHC RBC AUTO-ENTMCNC: 31.8 G/DL (ref 30–36.5)
MCV RBC AUTO: 86.6 FL (ref 80–99)
MONOCYTES # BLD: 1.27 K/UL (ref 0–1)
MONOCYTES NFR BLD: 10 % (ref 5–13)
NEUTS SEG # BLD: 8.87 K/UL (ref 1.8–8)
NEUTS SEG NFR BLD: 70 % (ref 32–75)
NRBC # BLD: 0 K/UL (ref 0–0.01)
NRBC BLD-RTO: 0 PER 100 WBC
PLATELET # BLD AUTO: 287 K/UL (ref 150–400)
PMV BLD AUTO: 10.6 FL (ref 8.9–12.9)
RBC # BLD AUTO: 4.39 M/UL (ref 4.1–5.7)
WBC # BLD AUTO: 12.7 K/UL (ref 4.1–11.1)

## 2025-08-28 PROCEDURE — 36415 COLL VENOUS BLD VENIPUNCTURE: CPT

## 2025-08-28 PROCEDURE — 6360000002 HC RX W HCPCS: Performed by: STUDENT IN AN ORGANIZED HEALTH CARE EDUCATION/TRAINING PROGRAM

## 2025-08-28 PROCEDURE — 99222 1ST HOSP IP/OBS MODERATE 55: CPT | Performed by: STUDENT IN AN ORGANIZED HEALTH CARE EDUCATION/TRAINING PROGRAM

## 2025-08-28 PROCEDURE — 2580000003 HC RX 258: Performed by: STUDENT IN AN ORGANIZED HEALTH CARE EDUCATION/TRAINING PROGRAM

## 2025-08-28 PROCEDURE — 2580000003 HC RX 258: Performed by: INTERNAL MEDICINE

## 2025-08-28 PROCEDURE — 6370000000 HC RX 637 (ALT 250 FOR IP): Performed by: STUDENT IN AN ORGANIZED HEALTH CARE EDUCATION/TRAINING PROGRAM

## 2025-08-28 PROCEDURE — 85025 COMPLETE CBC W/AUTO DIFF WBC: CPT

## 2025-08-28 PROCEDURE — 6360000002 HC RX W HCPCS: Performed by: INTERNAL MEDICINE

## 2025-08-28 PROCEDURE — 6370000000 HC RX 637 (ALT 250 FOR IP): Performed by: HOSPITALIST

## 2025-08-28 RX ORDER — AMIODARONE HYDROCHLORIDE 200 MG/1
TABLET ORAL
Qty: 62 TABLET | Refills: 0 | Status: SHIPPED | OUTPATIENT
Start: 2025-08-28 | End: 2025-10-05

## 2025-08-28 RX ORDER — LANOLIN ALCOHOL/MO/W.PET/CERES
400 CREAM (GRAM) TOPICAL 2 TIMES DAILY
Qty: 28 TABLET | Refills: 0 | Status: SHIPPED | OUTPATIENT
Start: 2025-08-28 | End: 2025-09-11

## 2025-08-28 RX ORDER — DOXYCYCLINE HYCLATE 100 MG
100 TABLET ORAL EVERY 12 HOURS SCHEDULED
Qty: 10 TABLET | Refills: 0 | Status: SHIPPED | OUTPATIENT
Start: 2025-08-28 | End: 2025-09-02

## 2025-08-28 RX ORDER — COLCHICINE 0.6 MG/1
0.6 TABLET ORAL 2 TIMES DAILY
Qty: 28 TABLET | Refills: 0 | Status: SHIPPED | OUTPATIENT
Start: 2025-08-28 | End: 2025-09-11

## 2025-08-28 RX ORDER — METOPROLOL TARTRATE 25 MG/1
25 TABLET, FILM COATED ORAL 2 TIMES DAILY
Qty: 60 TABLET | Refills: 0 | Status: SHIPPED | OUTPATIENT
Start: 2025-08-28

## 2025-08-28 RX ORDER — ASPIRIN 325 MG
975 TABLET ORAL 3 TIMES DAILY
Qty: 126 TABLET | Refills: 0 | Status: SHIPPED | OUTPATIENT
Start: 2025-08-28 | End: 2025-09-11

## 2025-08-28 RX ADMIN — DOXYCYCLINE HYCLATE 100 MG: 100 TABLET, FILM COATED ORAL at 09:50

## 2025-08-28 RX ADMIN — AMIODARONE HYDROCHLORIDE 400 MG: 200 TABLET ORAL at 09:51

## 2025-08-28 RX ADMIN — VALACYCLOVIR HYDROCHLORIDE 500 MG: 500 TABLET, FILM COATED ORAL at 09:51

## 2025-08-28 RX ADMIN — PANTOPRAZOLE SODIUM 40 MG: 40 TABLET, DELAYED RELEASE ORAL at 09:52

## 2025-08-28 RX ADMIN — PIPERACILLIN AND TAZOBACTAM 3375 MG: 3; .375 INJECTION, POWDER, LYOPHILIZED, FOR SOLUTION INTRAVENOUS at 00:25

## 2025-08-28 RX ADMIN — SODIUM CHLORIDE 1500 MG: 0.9 INJECTION, SOLUTION INTRAVENOUS at 11:14

## 2025-08-28 RX ADMIN — METOPROLOL TARTRATE 25 MG: 25 TABLET, FILM COATED ORAL at 09:51

## 2025-08-28 RX ADMIN — CLOPIDOGREL BISULFATE 75 MG: 75 TABLET, FILM COATED ORAL at 09:51

## 2025-08-28 RX ADMIN — ASPIRIN 975 MG: 325 TABLET ORAL at 09:51

## 2025-08-28 RX ADMIN — POTASSIUM CHLORIDE 40 MEQ: 1500 TABLET, EXTENDED RELEASE ORAL at 09:51

## 2025-08-28 RX ADMIN — ALLOPURINOL 300 MG: 100 TABLET ORAL at 09:50

## 2025-08-28 RX ADMIN — ASPIRIN 975 MG: 325 TABLET ORAL at 15:17

## 2025-08-28 RX ADMIN — COLCHICINE 0.6 MG: 0.6 TABLET, FILM COATED ORAL at 09:51

## 2025-08-28 RX ADMIN — Medication 400 MG: at 09:50

## 2025-08-28 RX ADMIN — PIPERACILLIN AND TAZOBACTAM 3375 MG: 3; .375 INJECTION, POWDER, LYOPHILIZED, FOR SOLUTION INTRAVENOUS at 12:51

## 2025-08-28 ASSESSMENT — PAIN SCALES - GENERAL
PAINLEVEL_OUTOF10: 0

## 2025-08-31 LAB
BACTERIA SPEC CULT: NORMAL
BACTERIA SPEC CULT: NORMAL
Lab: NORMAL
Lab: NORMAL

## 2025-10-01 ENCOUNTER — APPOINTMENT (OUTPATIENT)
Facility: HOSPITAL | Age: 50
End: 2025-10-01
Payer: COMMERCIAL

## 2025-10-06 ENCOUNTER — APPOINTMENT (OUTPATIENT)
Facility: HOSPITAL | Age: 50
End: 2025-10-06
Payer: COMMERCIAL

## 2025-10-08 ENCOUNTER — APPOINTMENT (OUTPATIENT)
Facility: HOSPITAL | Age: 50
End: 2025-10-08
Payer: COMMERCIAL

## 2025-10-13 ENCOUNTER — APPOINTMENT (OUTPATIENT)
Facility: HOSPITAL | Age: 50
End: 2025-10-13
Payer: COMMERCIAL

## 2025-10-15 ENCOUNTER — APPOINTMENT (OUTPATIENT)
Facility: HOSPITAL | Age: 50
End: 2025-10-15
Payer: COMMERCIAL

## 2025-10-20 ENCOUNTER — APPOINTMENT (OUTPATIENT)
Facility: HOSPITAL | Age: 50
End: 2025-10-20
Payer: COMMERCIAL

## (undated) PROCEDURE — 5A1221Z PERFORMANCE OF CARDIAC OUTPUT, CONTINUOUS: ICD-10-PCS

## (undated) PROCEDURE — 4A023N7 MEASUREMENT OF CARDIAC SAMPLING AND PRESSURE, LEFT HEART, PERCUTANEOUS APPROACH: ICD-10-PCS

## (undated) PROCEDURE — 5A09457 ASSISTANCE WITH RESPIRATORY VENTILATION, 24-96 CONSECUTIVE HOURS, CONTINUOUS POSITIVE AIRWAY PRESSURE: ICD-10-PCS

## (undated) DEVICE — KIT BLWR MISTER 5P 15L W/ TBNG SET IRRIG MIST TO IMPROVE

## (undated) DEVICE — ADHESIVE SKIN CLOSURE XL 42 CM 2.7 CC MESH LIQUIBAND SECUR

## (undated) DEVICE — SOLUTION IV 500 ML 0.9 NACL INJ EXCEL CONTAINER USP LF

## (undated) DEVICE — CANNULA PERF 15FR L12.5IN RG STPCOCK WIREWOUND BODY

## (undated) DEVICE — KIT,ANTI FOG,W/SPONGE & FLUID,SOFT PACK: Brand: MEDLINE

## (undated) DEVICE — BLADE OPHTH W1.5MM 15DEG ORNG HNDL SHRP SHRP DEL FOR CATRCT

## (undated) DEVICE — DISK-SHAPED STYLE, SILICONE (1 PER STERILE PKG): Brand: SCANLAN® RADIOMARK® GRAFT MARKERS

## (undated) DEVICE — SOLUTION IV 1000 ML 0.9 NACL INJ USP EXCEL PLAS CONTAINER

## (undated) DEVICE — TRANSFER BAG 300 ML: Brand: HAEMONETICS

## (undated) DEVICE — TRAY SURG CUST CRD CATH 3 PRT SSR

## (undated) DEVICE — DRESSING TRNSPAR W4XL475IN STD IV FLM W 2 SECURED STRP BORD

## (undated) DEVICE — GOWN,SIRUS,NONRNF,SETINSLV,XL,20/CS: Brand: MEDLINE

## (undated) DEVICE — DRAIN,WOUND,ROUND,24FR,5/16",FULL-FLUTED: Brand: MEDLINE

## (undated) DEVICE — ELECTRODE PT RET AD L9FT HI MOIST COND ADH HYDRGEL CORDED

## (undated) DEVICE — BLANKET WRM W25XL64IN NONWOVEN SFT LTWT PLIABLE HYPR

## (undated) DEVICE — SUTURE SZ 7 L18IN NONABSORBABLE SIL CCS L48MM 1/2 CIR STRNM M655G

## (undated) DEVICE — SHEATH INTRO 6FR L10CM MINI GWIRE L45CM 0.035IN COR KINK

## (undated) DEVICE — GUIDEWIRE VASC L150CM DIA0.035IN TIP L3MM PTFE J CRV FIX

## (undated) DEVICE — DRESSING FOAM W8.7XL9.1IN SAFETAC LAYR SELF ADH MEPILEX

## (undated) DEVICE — AORTIC PUNCHES ARE USED TO CREATE A UNIFORM OPENING IN BLOOD VESSELS DURING CARDIOVASCULAR SURGERY. THE VESSEL GRAFT IS INSERTED INTO THE CREATED OPENING AND SUTURED TO THE VESSEL WALL. AORTIC LANCETS ARE USED TO MAKE A SMALL UNIFORM CUT IN A BLOOD VESSEL TO FACILITATE INSERTION OF AN AORTIC PUNCH.  PUNCHES COME IN VARIOUS LENGTHS, DIAMETERS AND TIP CONFIGURATIONS.: Brand: CLEANCUT ROTATING AORTIC PUNCH

## (undated) DEVICE — KIT,1200CC CANISTER,3/16"X6' TUBING: Brand: MEDLINE INDUSTRIES, INC.

## (undated) DEVICE — SUTURE TICRON DBL ARMED 2 0 CV 305 42IN BLU N ABSRB BRAID 8886303551

## (undated) DEVICE — SET TUBE INSUFFLATION HI FLOW PNEUMOCLEAR

## (undated) DEVICE — 6 FOOT DISPOSABLE EXTENSION CABLE WITH SAFE CONNECT / SCREW-DOWN

## (undated) DEVICE — DRAIN SURG SGL COLL PT TB FOR ATS BG OASIS

## (undated) DEVICE — DRAPE SLUSH DISC W44XL66IN ST FOR RND BSIN HUSH SLUSH SYS

## (undated) DEVICE — OPEN HEART A- RICHMOND: Brand: MEDLINE INDUSTRIES, INC.

## (undated) DEVICE — SS SUTURE, 3 PER SLEEVE: Brand: MYO/WIRE II

## (undated) DEVICE — SUTURE PROL SZ 6-0 L30IN NONABSORBABLE BLU L13MM C-1 3/8 M8706

## (undated) DEVICE — CONNECTOR DRNGE 3/8 1/2X3/16X3/16IN BASE L5MM ARM L10-13MM

## (undated) DEVICE — OPEN HEART B-RICHMOND: Brand: MEDLINE INDUSTRIES, INC.

## (undated) DEVICE — CANNULA SUCT L8.5IN DIA20FR VENT CONN 3/8IN ART MTL CRV TIP

## (undated) DEVICE — SOLUTION IRRIG 1000ML H2O PIC PLAS SHATTERPROOF CONTAINER

## (undated) DEVICE — LIQUIBAND RAPID ADHESIVE 36/CS 0.8ML: Brand: MEDLINE

## (undated) DEVICE — GLOVE SURG SZ 75 CRM LTX FREE POLYISOPRENE POLYMER BEAD ANTI

## (undated) DEVICE — GLOVE SURG SZ 7.5 L11.2IN THK9.8MIL STRW LTX POLYMER BEAD

## (undated) DEVICE — COVER,TABLE,HEAVY DUTY,77"X90",STRL: Brand: MEDLINE

## (undated) DEVICE — DUAL LUMEN STOMACH TUBE MULTI-FUNCTIONAL PORT: Brand: SALEM SUMP

## (undated) DEVICE — SUTURE PROL SZ 4-0 L36IN NONABSORBABLE BLU L26MM SH 1/2 CIR 8521H

## (undated) DEVICE — RETRACTOR SURG INSRT SUT HLD OCTOBASE

## (undated) DEVICE — SUTURE VICRYL 3-0 L36IN ABSRB VLT CT-1 L36MM 1/2 CIR J344H

## (undated) DEVICE — CATHETER ETER VASC OD6FR CARD 145DEG PGTL BRAID JL40 JR40 MP

## (undated) DEVICE — SYRINGE 20ML LL S/C 50

## (undated) DEVICE — BLADE OPHTH 180DEG CUT SURF BLU STR SHRP DBL BVL GRINDLESS

## (undated) DEVICE — SOLUTION IRRIG 1000ML 09% SOD CHL USP PIC PLAS CONTAINER

## (undated) DEVICE — SUTURE PROL SZ 8 0 L24IN NONABSORBABLE BLU L65MM BV130 5 3 8 M8732

## (undated) DEVICE — Device: Brand: JELCO

## (undated) DEVICE — PROVE COVER: Brand: UNBRANDED

## (undated) DEVICE — LEAD PACE L475MM CHNL A OR V MYOCARDIAL STEROID ELUT SIL

## (undated) DEVICE — CATHETER IV 14 GAX2 IN STR INTROCAN SAFETY

## (undated) DEVICE — AGENT HEMSTAT W4XL4IN OXIDIZED REGENERATED CELOS ABSRB SFT

## (undated) DEVICE — CLIP LIG M BLU TI HRT SHP WIRE HORZ 24 CLIPS/PK 25PK/CA

## (undated) DEVICE — SYSTEM ENDOSCP VES HARV W/ TOOL CANN SEAL SHT PRT BLNT TIP

## (undated) DEVICE — SPONGE GZ W4XL4IN COT RADPQ HIGHLY ABSRB STERILE

## (undated) DEVICE — SUTURE PROL SZ 7-0 L24IN NONABSORBABLE BLU L9.3MM BV-1 3/8 M8702

## (undated) DEVICE — SUTURE MONOCRYL SZ 3-0 L27IN ABSRB UD L24MM PS-1 3/8 CIR PRIM Y936H

## (undated) DEVICE — AVID DUAL STAGE VENOUS DRAINAGE CANNULA: Brand: AVID DUAL STAGE VENOUS DRAINAGE CANNULA

## (undated) DEVICE — SUTURE PROL SZ 5-0 L30IN NONABSORBABLE BLU L13MM RB-2 1/2 8710H

## (undated) DEVICE — SUTURE ETHIBOND EXCEL SZ 3-0 L36IN NONABSORBABLE GRN BB L17MM X588H

## (undated) DEVICE — SYRINGE MEDICAL 3ML CLEAR PLASTIC STANDARD NON CONTROL LUERLOCK TIP DISPOSABLE

## (undated) DEVICE — MEDI-TRACE CADENCE ADULT, DEFIBRILLATION ELECTRODE -RTS  (10 PR/PK) - PHYSIO-CONTROL: Brand: MEDI-TRACE CADENCE

## (undated) DEVICE — TEMP PACING WIRE: Brand: MYO/WIRE

## (undated) DEVICE — PRESSURE MONITORING SET: Brand: TRUWAVE

## (undated) DEVICE — BANDAGE COMPR W6INXL10YD ST M E WHITE/BEIGE

## (undated) DEVICE — SYRINGE MED 10ML LUERLOCK TIP W/O SFTY DISP

## (undated) DEVICE — SYRINGE MED 50ML LUERLOCK TIP

## (undated) DEVICE — SYRINGE MED 30ML STD CLR PLAS LUERLOCK TIP N CTRL DISP

## (undated) DEVICE — SOLUTION IV 1000ML 140MEQ/L SOD 5MEQ/L K 3MEQ/L MG 27MEQ/L